# Patient Record
Sex: FEMALE | Race: WHITE | NOT HISPANIC OR LATINO | Employment: FULL TIME | URBAN - METROPOLITAN AREA
[De-identification: names, ages, dates, MRNs, and addresses within clinical notes are randomized per-mention and may not be internally consistent; named-entity substitution may affect disease eponyms.]

---

## 2017-12-15 ENCOUNTER — TRANSCRIBE ORDERS (OUTPATIENT)
Dept: ADMINISTRATIVE | Facility: HOSPITAL | Age: 62
End: 2017-12-15

## 2017-12-15 DIAGNOSIS — Z12.31 SCREENING MAMMOGRAM, ENCOUNTER FOR: Primary | ICD-10-CM

## 2018-02-14 ENCOUNTER — OFFICE VISIT (OUTPATIENT)
Dept: ORTHOPEDIC SURGERY | Facility: CLINIC | Age: 63
End: 2018-02-14

## 2018-02-14 VITALS
DIASTOLIC BLOOD PRESSURE: 84 MMHG | HEART RATE: 67 BPM | WEIGHT: 131 LBS | SYSTOLIC BLOOD PRESSURE: 128 MMHG | HEIGHT: 55 IN | BODY MASS INDEX: 30.32 KG/M2

## 2018-02-14 DIAGNOSIS — M19.019 ACROMIOCLAVICULAR JOINT ARTHRITIS: ICD-10-CM

## 2018-02-14 DIAGNOSIS — S49.92XA INJURY OF GLENOID LABRUM, LEFT, INITIAL ENCOUNTER: ICD-10-CM

## 2018-02-14 DIAGNOSIS — R52 PAIN: Primary | ICD-10-CM

## 2018-02-14 PROCEDURE — 73030 X-RAY EXAM OF SHOULDER: CPT | Performed by: NURSE PRACTITIONER

## 2018-02-14 PROCEDURE — 99203 OFFICE O/P NEW LOW 30 MIN: CPT | Performed by: NURSE PRACTITIONER

## 2018-02-14 RX ORDER — CYCLOBENZAPRINE HCL 10 MG
10 TABLET ORAL 3 TIMES DAILY PRN
COMMUNITY

## 2018-02-14 RX ORDER — TRIAMCINOLONE ACETONIDE 55 UG/1
2 SPRAY, METERED NASAL DAILY
COMMUNITY
End: 2019-02-05 | Stop reason: ALTCHOICE

## 2018-02-14 RX ORDER — ASCORBIC ACID 500 MG
500 TABLET ORAL DAILY
COMMUNITY

## 2018-02-14 NOTE — PROGRESS NOTES
Subjective:     Patient ID: Flores Seaman is a 62 y.o. female.     Chief Complaint: left shoulder pain    History of Present Illness    Ms. Seaman is a 62 y.o female who presents to clinic with a reported 1 year history of pain at left shoulder. Denies known injury, she is left hand dominant. Pain present at superior anterior and posterior joint line left shoulder. Only experiences pain with certain motions such as bring arm across body and lifting up as well as with abduction and external rotation activities. Denies previous known shoulder dislocations. She has not been able to complete upper body strength training within the last one year secondary to left shoulder pain. Has completed physical therapy which was helpful for a short period however symptoms progressively returned. Denies that the pain is radiating down her left upper extremity, denies pain radiating up into left side of her neck. Denies presence of numbness and tingling radiating down left upper extremity. Initially when first began experiencing pain was encouraged to take aleve (2 tablets) with mild symptom relief however she is not taking any medications. Denies previous x-ray, MRI, CT and corticosteroid injections. Rates pain at 7 out of 10, aching in nature again with certain activities. Denies all other concerns present at this time.      Social History     Occupational History   • Not on file.     Social History Main Topics   • Smoking status: Never Smoker   • Smokeless tobacco: Not on file   • Alcohol use Yes   • Drug use: Not on file   • Sexual activity: Not on file      History reviewed. No pertinent past medical history.  History reviewed. No pertinent surgical history.    Family History   Problem Relation Age of Onset   • Cancer Father          Review of Systems   Constitutional: Negative for chills, diaphoresis, fever and unexpected weight change.   HENT: Negative for hearing loss, nosebleeds, sore throat and tinnitus.    Eyes:  "Negative for pain and visual disturbance.   Respiratory: Positive for cough. Negative for shortness of breath and wheezing.    Cardiovascular: Negative for chest pain and palpitations.   Gastrointestinal: Negative for abdominal pain, diarrhea, nausea and vomiting.   Endocrine: Negative for cold intolerance, heat intolerance and polydipsia.   Genitourinary: Negative for difficulty urinating, dysuria and hematuria.   Musculoskeletal: Positive for arthralgias and myalgias. Negative for joint swelling.   Skin: Negative for rash and wound.   Allergic/Immunologic: Negative for environmental allergies.   Neurological: Positive for numbness. Negative for dizziness and syncope.   Hematological: Does not bruise/bleed easily.   Psychiatric/Behavioral: Negative for dysphoric mood and sleep disturbance. The patient is not nervous/anxious.            Objective:  Physical Exam    Vital signs reviewed.   General: No acute distress.  Eyes: conjunctiva clear; pupils equally round and reactive  ENT: external ears and nose atraumatic; oropharynx clear  CV: no peripheral edema  Resp: normal respiratory effort  Skin: no rashes or wounds; normal turgor  Psych: mood and affect appropriate; recent and remote memory intact  Vitals:    02/14/18 1405   BP: 128/84   Pulse: 67   Weight: 59.4 kg (131 lb)   Height: 62.8 cm (24.71\")     Last 2 weights    02/14/18  1405   Weight: 59.4 kg (131 lb)     Body mass index is 150.91 kg/(m^2).     Left Shoulder Exam     Range of Motion   Forward Flexion: 180+   External Rotation: 60   Internal Rotation 0 degrees: T12     Muscle Strength   Internal Rotation: 5/5   External Rotation: 5/5   Supraspinatus: 5/5   Subscapularis: 5/5   Biceps: 5/5     Tests   Apprehension: negative  Cross Arm: negative  Drop Arm: negative  Hawkin's test: positive  Impingement: positive  Sulcus: absent    Other   Erythema: absent  Scars: absent  Sensation: normal  Pulse: present     Comments:  Negative empty can  positive " Maries's  negative Speed's  negative bear hug exam  Pain present over superior anterior and posterior joint line  Pain present with apprehension test              Imaging:  Left Shoulder X-Ray  Indication: Pain  AP Internal and External Rotation views    Findings:  No fracture  No bony lesion  Normal soft tissues  AC joint arthritis    No prior studies were available for comparison.    Assessment:       1. Pain    2. Acromioclavicular joint arthritis    3. Injury of glenoid labrum, left, initial encounter          Plan:  1. Discussed plan of care with patient. Will schedule for US guided glenohumeral injection anterior aspect left shoulder.   2. Provided with sample of topical Pennsaid. Encouraged to call back to clinic if medication is helpful and will send in prescription to specialty pharmacy. Will plan to see her back in clinic in 6-8 weeks. She is able to continue with strengthening activities as pain permits. Patient verbalized understanding of all information and agrees with plan of care. Denies all other concerns present at this time.     Orders:  Orders Placed This Encounter   Procedures   • XR Shoulder 2+ View Left   • Ambulatory Referral to Sports Medicine       HERBERT query complete.    Dictated utilizing Dragon dictation

## 2018-02-21 ENCOUNTER — OFFICE VISIT (OUTPATIENT)
Dept: SPORTS MEDICINE | Facility: CLINIC | Age: 63
End: 2018-02-21

## 2018-02-21 VITALS
DIASTOLIC BLOOD PRESSURE: 74 MMHG | WEIGHT: 130 LBS | HEIGHT: 63 IN | SYSTOLIC BLOOD PRESSURE: 112 MMHG | BODY MASS INDEX: 23.04 KG/M2

## 2018-02-21 DIAGNOSIS — M25.512 LEFT SHOULDER PAIN, UNSPECIFIED CHRONICITY: Primary | ICD-10-CM

## 2018-02-21 PROCEDURE — 20611 DRAIN/INJ JOINT/BURSA W/US: CPT | Performed by: FAMILY MEDICINE

## 2018-02-21 RX ORDER — TRIAMCINOLONE ACETONIDE 40 MG/ML
80 INJECTION, SUSPENSION INTRA-ARTICULAR; INTRAMUSCULAR ONCE
Status: COMPLETED | OUTPATIENT
Start: 2018-02-21 | End: 2018-02-21

## 2018-02-21 RX ADMIN — TRIAMCINOLONE ACETONIDE 80 MG: 40 INJECTION, SUSPENSION INTRA-ARTICULAR; INTRAMUSCULAR at 11:45

## 2018-02-21 NOTE — PROGRESS NOTES
"Here today for ultrasound-guided injection of the left shoulder for possible labral tear.    Ultrasound-Guided Shoulder Injection Procedure Note    Left shoulder injection was discussed with the patient in detail, including indication, risks, benefits, and alternatives. Verbal consent was given for the procedure. Injection was performed by MD.  Injection site was identified by ultrasound examination, then cleaned with Betadine and alcohol swabs. Prior to needle insertion, ethyl chloride spray was used for surface anesthesia. Sterile technique was used. Ultrasound guidance was indicated for injection accuracy.  A 22-gauge, 3.5\" spinal needle was guided to the glenohumeral joint under continuous direct ultrasound visualization. Injectate was seen filing the space and passed without difficulty. The needle was removed and a simple bandage was applied. The procedure was tolerated well without difficulty.    Injection mixture:  1% lidocaine without epinephrine: 2 mL  0.5% bupivacaine without epinephrine: 2 mL  40 mg/mL triamcinolone acetonide: 2 mL     Diagnoses and all orders for this visit:    Left shoulder pain, unspecified chronicity  -     triamcinolone acetonide (KENALOG-40) injection 80 mg; Inject 2 mL into the joint 1 (One) Time.       "

## 2018-04-13 ENCOUNTER — OFFICE VISIT (OUTPATIENT)
Dept: ORTHOPEDIC SURGERY | Facility: CLINIC | Age: 63
End: 2018-04-13

## 2018-04-13 VITALS — BODY MASS INDEX: 22.32 KG/M2 | WEIGHT: 126 LBS | HEIGHT: 63 IN

## 2018-04-13 DIAGNOSIS — S49.92XA INJURY OF GLENOID LABRUM, LEFT, INITIAL ENCOUNTER: Primary | ICD-10-CM

## 2018-04-13 PROCEDURE — 99212 OFFICE O/P EST SF 10 MIN: CPT | Performed by: NURSE PRACTITIONER

## 2018-04-13 NOTE — PROGRESS NOTES
Subjective:     Patient ID: Flores Seaman is a 62 y.o. female.    Chief Complaint: follow-up left shoulder, AC joint arthritis    History of Present Illness    Ms. Seaman presents to clinic for follow-up left shoulder. States she has noted significant improvement of symptoms after receiving corticosteroid injection at glenohumeral joint. States she has noted significant symptom improvement after injection. Initially presented to this APR[facility] with a reported 1 year history of pain at left shoulder. Denies known injury, she is left hand dominant. Pain present at superior anterior and posterior joint line left shoulder. Only experiences pain with certain motions such as bring arm across body and lifting up as well as with abduction and external rotation activities. Denies previous known shoulder dislocations.    She has not been able to complete upper body strength training within the last one year secondary to left shoulder pain. Has completed physical therapy which was helpful for a short period however symptoms progressively returned. Denies that the pain is radiating down her left upper extremity, denies pain radiating up into left side of her neck. Denies presence of numbness and tingling radiating down left upper extremity. Initially when first began experiencing pain was encouraged to take aleve (2 tablets) with mild symptom relief however she is not taking any medications. Denies previous x-ray, MRI, CT and corticosteroid injections. Rates pain at 7 out of 10, aching in nature again with certain activities. Denies all other concerns present at this time.      Social History     Occupational History   • Not on file.     Social History Main Topics   • Smoking status: Never Smoker   • Smokeless tobacco: Never Used   • Alcohol use Yes   • Drug use: No   • Sexual activity: Defer      History reviewed. No pertinent past medical history.  History reviewed. No pertinent surgical history.    Family History   Problem  "Relation Age of Onset   • Cancer Father          Review of Systems   Constitutional: Negative for activity change, chills, diaphoresis, fever and unexpected weight change.   HENT: Negative for hearing loss, nosebleeds, sore throat and tinnitus.    Eyes: Negative for pain and visual disturbance.   Respiratory: Negative for cough, shortness of breath and wheezing.    Cardiovascular: Negative for chest pain and palpitations.   Gastrointestinal: Negative for abdominal pain, diarrhea, nausea and vomiting.   Endocrine: Negative for cold intolerance, heat intolerance and polydipsia.   Genitourinary: Negative for difficulty urinating, dysuria and hematuria.   Musculoskeletal: Positive for arthralgias and myalgias. Negative for joint swelling.   Skin: Negative for rash and wound.   Allergic/Immunologic: Negative for environmental allergies.   Neurological: Negative for dizziness, syncope and numbness.   Hematological: Does not bruise/bleed easily.   Psychiatric/Behavioral: Negative for dysphoric mood and sleep disturbance. The patient is not nervous/anxious.            Objective:  Physical Exam    General: No acute distress.  Eyes: conjunctiva clear; pupils equally round and reactive  ENT: external ears and nose atraumatic; oropharynx clear  CV: no peripheral edema  Resp: normal respiratory effort  Skin: no rashes or wounds; normal turgor  Psych: mood and affect appropriate; recent and remote memory intact    Vitals:    04/13/18 0836   Weight: 57.2 kg (126 lb)   Height: 160 cm (63\")     1    04/13/18  0836   Weight: 57.2 kg (126 lb)     Body mass index is 22.32 kg/m².     Ortho Exam    Left Shoulder Exam      Range of Motion   Forward Flexion: 180+   External Rotation: 60   Internal Rotation 0 degrees: T12      Muscle Strength   Internal Rotation: 5/5   External Rotation: 5/5   Supraspinatus: 5/5   Subscapularis: 5/5   Biceps: 5/5      Tests   Apprehension: negative  Cross Arm: negative  Drop Arm: negative  Hawkin's test: " positive  Impingement: positive  Sulcus: absent     Other   Erythema: absent  Scars: absent  Sensation: normal  Pulse: present      Comments:    Negative empty can  positive Jordan's  negative Speed's  negative bear hug exam  Pain previously present over superior anterior and posterior joint line have significantly decreased    Assessment:       1. Injury of glenoid labrum, left, initial encounter          Plan:  1. Discussed plan of care with patient. Will plan to follow-up only as needed in 3-4 months. Patient verbalized understanding of all information and agrees with plan of care. Denies all other concerns present at this time.     Orders:  No orders of the defined types were placed in this encounter.      Dictated utilizing Dragon dictation

## 2018-04-17 PROBLEM — S49.90XA INJURY OF GLENOID LABRUM: Status: ACTIVE | Noted: 2018-04-17

## 2018-06-07 ENCOUNTER — HOSPITAL ENCOUNTER (OUTPATIENT)
Dept: MAMMOGRAPHY | Facility: HOSPITAL | Age: 63
Discharge: HOME | End: 2018-06-07
Attending: INTERNAL MEDICINE | Admitting: INTERNAL MEDICINE

## 2018-06-07 DIAGNOSIS — Z12.31 SCREENING MAMMOGRAM, ENCOUNTER FOR: ICD-10-CM

## 2018-06-07 PROCEDURE — 77067 SCR MAMMO BI INCL CAD: CPT

## 2018-06-07 PROCEDURE — 77063 BREAST TOMOSYNTHESIS BI: CPT

## 2018-10-23 ENCOUNTER — HOSPITAL ENCOUNTER (OUTPATIENT)
Dept: CT IMAGING | Facility: HOSPITAL | Age: 63
Discharge: HOME | End: 2018-10-23
Attending: INTERNAL MEDICINE | Admitting: INTERNAL MEDICINE

## 2018-10-23 ENCOUNTER — TRANSCRIBE ORDERS (OUTPATIENT)
Dept: ADMINISTRATIVE | Facility: HOSPITAL | Age: 63
End: 2018-10-23

## 2018-10-23 DIAGNOSIS — R10.9 ABDOMINAL PAIN, UNSPECIFIED ABDOMINAL LOCATION: ICD-10-CM

## 2018-10-23 DIAGNOSIS — R31.9 HEMATURIA, UNSPECIFIED TYPE: Primary | ICD-10-CM

## 2018-10-23 DIAGNOSIS — R31.9 HEMATURIA, UNSPECIFIED TYPE: ICD-10-CM

## 2018-10-23 PROCEDURE — 74176 CT ABD & PELVIS W/O CONTRAST: CPT

## 2018-11-26 ENCOUNTER — OFFICE VISIT (OUTPATIENT)
Dept: SPORTS MEDICINE | Facility: CLINIC | Age: 63
End: 2018-11-26

## 2018-11-26 VITALS
BODY MASS INDEX: 23.21 KG/M2 | DIASTOLIC BLOOD PRESSURE: 70 MMHG | SYSTOLIC BLOOD PRESSURE: 110 MMHG | WEIGHT: 131 LBS | HEIGHT: 63 IN

## 2018-11-26 DIAGNOSIS — M25.512 CHRONIC LEFT SHOULDER PAIN: Primary | ICD-10-CM

## 2018-11-26 DIAGNOSIS — G89.29 CHRONIC LEFT SHOULDER PAIN: Primary | ICD-10-CM

## 2018-11-26 DIAGNOSIS — M75.22 BICEPS TENDINITIS OF LEFT UPPER EXTREMITY: ICD-10-CM

## 2018-11-26 PROCEDURE — 76942 ECHO GUIDE FOR BIOPSY: CPT | Performed by: FAMILY MEDICINE

## 2018-11-26 PROCEDURE — 20550 NJX 1 TENDON SHEATH/LIGAMENT: CPT | Performed by: FAMILY MEDICINE

## 2018-11-26 PROCEDURE — 99214 OFFICE O/P EST MOD 30 MIN: CPT | Performed by: FAMILY MEDICINE

## 2018-11-26 RX ORDER — ALBUTEROL SULFATE 90 UG/1
2 AEROSOL, METERED RESPIRATORY (INHALATION) EVERY 4 HOURS PRN
COMMUNITY
End: 2020-12-14 | Stop reason: SDUPTHER

## 2018-11-26 RX ORDER — TRIAMCINOLONE ACETONIDE 40 MG/ML
40 INJECTION, SUSPENSION INTRA-ARTICULAR; INTRAMUSCULAR ONCE
Status: COMPLETED | OUTPATIENT
Start: 2018-11-26 | End: 2018-11-26

## 2018-11-26 RX ADMIN — TRIAMCINOLONE ACETONIDE 40 MG: 40 INJECTION, SUSPENSION INTRA-ARTICULAR; INTRAMUSCULAR at 12:23

## 2018-11-26 NOTE — PROGRESS NOTES
"Flores is a 63 y.o. year old female    Chief Complaint   Patient presents with   • Shoulder Pain     Left // x Month Ago        History of Present Illness  HPI   Today for left shoulder pain.  New pain started about a month ago, worse with increased resistance and weightlifting classes.  This is different from the pain that she saw Nicolette Castellano for in February when I performed an intra-articular injection for her.  Describes constant, sharp anterior pain, radiates to the upper arm.  Worse if holding her purse or abducting the arm.  No associated numbness or tingling.  Moderately severe, gradually worsening.    I have reviewed the patient's medical, family, and social history in detail and updated the computerized patient record.    Review of Systems   Constitutional: Negative for fever.   Skin: Negative for wound.   Neurological: Negative for numbness.   All other systems reviewed and are negative.      /70   Ht 160 cm (62.99\")   Wt 59.4 kg (131 lb)   BMI 23.21 kg/m²      Physical Exam    Vital signs reviewed.   General: No acute distress.  Eyes: conjunctiva clear; pupils equally round and reactive  ENT: external ears and nose atraumatic; oropharynx clear  CV: no peripheral edema, 2+ distal pulses  Resp: normal respiratory effort, no use of accessory muscles  Skin: no rashes or wounds; normal turgor  Psych: mood and affect appropriate; recent and remote memory intact  Neuro: sensation to light touch intact    [facility] Exam:  Ortho Exam  Left shoulder: Normal appearance.  Tenderness to palpation on the subacromial space and the long head of the bicep tendon.  Normal range of motion.  Positive Neer and Mcfadden.  Positive speed but negative Yergason.  Normal rotator cuff strength but there is pain with external rotation.  Negative [place].  Equivocal circumduction.    Reviewed reports from x-rays that were taken in February, notable for some acromioclavicular arthritis.    Ultrasound-Guided Shoulder Injection " "Procedure Note    Left shoulder injection was discussed with the patient in detail, including indication, risks, benefits, and alternatives. Verbal consent was given for the procedure. Injection was performed by MD.  Injection site was identified by ultrasound examination, then cleaned with Betadine and alcohol swabs. Prior to needle insertion, ethyl chloride spray was used for surface anesthesia. Sterile technique was used. Ultrasound guidance was indicated for injection accuracy.  A 22-gauge, 3.5\" spinal needle was guided to the biceps tendon sheath under continuous direct ultrasound visualization. Injectate was seen filing the space and passed without difficulty. The needle was removed and a simple bandage was applied. The procedure was tolerated well without difficulty.    Injection mixture:  1% lidocaine without epinephrine: 1 mL  40 mg/mL triamcinolone acetonide: 1 mL           Diagnoses and all orders for this visit:    Chronic left shoulder pain  -     Ambulatory Referral to Physical Therapy Evaluate and treat    Biceps tendinitis of left upper extremity  -     Ambulatory Referral to Physical Therapy Evaluate and treat  -     triamcinolone acetonide (KENALOG-40) injection 40 mg; Inject 1 mL into the appropriate joint as directed by provider 1 (One) Time.    Other orders  -     albuterol (PROVENTIL HFA;VENTOLIN HFA) 108 (90 Base) MCG/ACT inhaler; Inhale 2 puffs Every 4 (Four) Hours As Needed for Wheezing.    Discuss that her pain is likely multifactorial.  This could be stemming from a labral tear as was suspected earlier this year, but her presentation is certainly different today.  Agreed upon biceps tendon sheath injection today which was tolerated well, as this appears to be the primary factor of her pain at this time.  Discussed that would correlate to labral pathology.  She also has some impingement signs, possibly related to her acromioclavicular arthritis.  Depending on her response we may choose to " perform a subacromial injection or evaluate further with MRI.  We'll follow-up to check on her progress in about 6 weeks.    EMR Dragon/Transcription disclaimer:    Much of this encounter note is an electronic transcription/translation of spoken language to printed text.  The electronic translation of spoken language may permit erroneous, or at times, nonsensical words or phrases to be inadvertently transcribed.  Although I have reviewed the note for such errors some may still exist.

## 2018-12-05 ENCOUNTER — HOSPITAL ENCOUNTER (OUTPATIENT)
Dept: PHYSICAL THERAPY | Facility: HOSPITAL | Age: 63
Setting detail: THERAPIES SERIES
Discharge: HOME | End: 2018-12-05

## 2018-12-05 DIAGNOSIS — G89.29 CHRONIC LEFT SHOULDER PAIN: Primary | ICD-10-CM

## 2018-12-05 DIAGNOSIS — M25.512 CHRONIC LEFT SHOULDER PAIN: Primary | ICD-10-CM

## 2018-12-05 PROCEDURE — 97161 PT EVAL LOW COMPLEX 20 MIN: CPT | Performed by: PHYSICAL THERAPIST

## 2018-12-05 NOTE — THERAPY EVALUATION
Outpatient Physical Therapy Ortho Initial Evaluation   [place]     Patient Name: Flores Seaman  : 1955  MRN: [id number]  Today's Date: 2018      Visit Date: 2018    Patient Active Problem List   Diagnosis   • Injury of glenoid labrum        No past medical history on file.     No past surgical history on file.    Visit Dx:     ICD-10-CM ICD-9-CM   1. Chronic left shoulder pain M25.512 719.41    G89.29 338.29       Patient History     Row Name 18 1200             History    Chief Complaint  Difficulty with daily activities;Pain  -GC      Type of Pain  Shoulder pain left  -GC      Brief Description of Current Complaint  Pt reports an almost year history of left shoulder pain with swimming and lifting weights. she has a previous labral injury. she has had 2 cortisone injections thus far which states has helped the pain somewhat. She was seen by Dr. Pantoja who has referred her for therapy.  -GC      Patient/Caregiver Goals  Relieve pain;Return to prior level of function;Improve strength  -GC      Patient's Rating of General Health  Good  -GC      Hand Dominance  left-handed  -GC      Occupation/sports/leisure activities  respiratory therapist  -      What clinical tests have you had for this problem?  CT scan  -GC      Results of Clinical Tests  negative  -GC         Pain     Pain Location  Shoulder left  -GC      Pain at Present  1  -GC      Pain at Best  1  -GC      Pain at Worst  2  -GC      Pain Frequency  Constant/continuous  -GC      Pain Description  Aching;Discomfort;Sore;Tender  -GC      What Performance Factors Make the Current Problem(s) WORSE?  Pt c/o pain when she tries to swim and when she lifts weights  -GC      What Performance Factors Make the Current Problem(s) BETTER?  Pt feels better if she rests her left UE  -GC      Difficulties at work?  Pt has just a little difficulty with the steering/torque that is created with the use of the COW   -GC      Difficulties  with ADL's?  Pt has pain when she uses the vacuum  -GC      Difficulties with recreational activities?  Pt has painwith her swimming and weight leifting  -GC         Daily Activities    Primary Language  English  -GC      Are you able to read  Yes  -GC      Are you able to write  Yes  -GC      How does patient learn best?  Reading  -GC      Teaching needs identified  Home Exercise Program;Management of Condition  -GC      Patient is concerned about/has problems with  Repetitive movements of the hand, arm, shoulder;Performing sports, recreation, and play activities;Performing home management (household chores, shopping, care of dependents)  -GC      Does patient have problems with the following?  None  -GC      Barriers to learning  None  -GC      Pt Participated in POC and Goals  Yes  -GC         Safety    Are you being hurt, hit, or frightened by anyone at home or in your life?  No  -GC      Are you being neglected by a caregiver  No  -GC        User Key  (r) = Recorded By, (t) = Taken By, (c) = Cosigned By    Initials Name Provider Type    GC Liam Henson, PT Physical Therapist          PT Ortho     Row Name 12/05/18 1200       Posture/Observations    Forward Head  Mild  -GC    Cervical Lordosis  Decreased  -GC    Thoracic Kyphosis  Normal  -GC    Rounded Shoulders  Bilateral:;Mild  -GC    Scapular Elevation  Bilateral:;Normal  -GC    Scapular winging  Bilateral:;Normal  -GC    Posture/Observations Comments  --  -GC       Shoulder Impingement/Rotator Cuff Special Tests    Mcfadden-Moody Test (RC Lesion vs. Bursitis)  Left:;Positive  -GC    Neer Impingement Test (RC Lesion vs. Bursitis)  Left:;Negative  -GC    Drop Arm Test (Full Thickness RC Lesion)  Left:;Negative  -GC       Shoulder Laxity/Instability Special Tests    Load and Shift Test  Left:;Negative  -GC    Sulcus Sign, 0 Degrees  Left:;Negative  -GC    Anterior Apprehension/Relocation Test, at 90 Degrees  Left:;Negative  -GC       Biceps/Labral Special  Tests    Conecuh's Test (Labral Test)  Left:;Negative  -GC       Shoulder Girdle Palpation    Supraspinatus Insertion  Left:;Tender  -GC    Long Head of Biceps  Left:;Tender  -GC    Greater Tubercule  Left:;Tender  -GC       Left Upper Ext    Lt Shoulder Abduction AROM  WFL  -GC    Lt Shoulder Extension AROM  WFL  -GC    Lt Shoulder Flexion AROM  WFL  -GC    Lt Shoulder External Rotation AROM  WFL  -GC    Lt Shoulder Internal Rotation AROM  72 degrees  -GC    Lt Elbow Extension/Flexion AROM  WFL  -GC       MMT Right Upper Ext    Rt Shoulder Flexion MMT, Gross Movement  (4+/5) good plus  -GC    Rt Shoulder Extension MMT, Gross Movement  (5/5) normal  -GC    Rt Shoulder ABduction MMT, Gross Movement  (4+/5) good plus  -GC    Rt Shoulder ADduction MMT, Gross Movement  (5/5) normal  -GC    Rt Shoulder Internal Rotation MMT, Gross Movement  (5/5) normal  -GC    Rt Shoulder External Rotation MMT, Gross Movement  (4/5) good  -GC    Rt Scapular Elevation MMT, Gross Movement  (5/5) normal  -GC    Rt Scapular ADduction MMT, Gross Movement  (4+/5) good plus  -GC    Rt Scapular ADduction & Depression MMT, Gross Movement  (4+/5) good plus  -GC    Rt Scapular ADD & Medial Rotation MMT, Gross Movement  (4/5) good  -GC    Rt Scapular ABD & Lateral Rotation MMT, Gross Movement  (4/5) good  -GC    Rt Elbow Flexion MMT, Gross Movement:  (5/5) normal  -GC    Rt Elbow Extension MMT, Gross Movement:  (5/5) normal  -GC    Rt Upper Extremity Comments   scaption strength is 4/5  -GC       Sensation    Light Touch  No apparent deficits  -GC      User Key  (r) = Recorded By, (t) = Taken By, (c) = Cosigned By    Initials Name Provider Type    GC Liam Henson, PT Physical Therapist                      Therapy Education  Given: HEP, Symptoms/condition management, Pain management  Program: New  How Provided: Verbal, Demonstration, Written  Provided to: Patient  Level of Understanding: Teach back education performed, Verbalized, Demonstrated      PT OP Goals     Row Name 12/05/18 1200          PT Short Term Goals    STG Date to Achieve  12/19/18  -GC     STG 1  decrease left shoulder pain to 1/10 with activity.  -GC     STG 2  Increase left shoulder IR AROM to WFL with testing.  -GC     STG 3  Increase left shoulder girdle strength to at least 4+/5 all planes with testing.  -GC     STG 4  Pt will be independent with her HEP issued by this therapist.  -GC        Long Term Goals    LTG Date to Achieve  01/02/19  -GC     LTG 1  Decrease left shoulder pain to 0/10 with activity.  -GC     LTG 2  Increase left shoulder girdles strength to 5/5 all planes with testing.  -GC     LTG 3  Pt will be independent with all ADLsand have a Quick Dash score < 10.  -        Time Calculation    PT Goal Re-Cert Due Date  01/02/19  -       User Key  (r) = Recorded By, (t) = Taken By, (c) = Cosigned By    Initials Name Provider Type     Liam Henson, PT Physical Therapist          PT Assessment/Plan     Row Name 12/05/18 1200          PT Assessment    Functional Limitations  Limitation in home management;Limitations in community activities;Limitations in functional capacity and performance;Performance in leisure activities;Performance in work activities  -     Impairments  Impaired flexibility;Range of motion;Peripheral nerve integrity;Muscle strength  -     Assessment Comments  Pt presents with a year plus histroy of left shoulder pain that she rates up 2/10 with activity. She ahs decreased left shoulder IR AROM, decreased left shoulder girdle strength, and decreased function secondary to the above.  -     Rehab Potential  Good  -     Patient/caregiver participated in establishment of treatment plan and goals  Yes  -     Patient would benefit from skilled therapy intervention  Yes  -GC        PT Plan    PT Frequency  1x/week;2x/week  -     Predicted Duration of Therapy Intervention (Therapy Eval)  4 weeks  -     Planned CPT's?  PT EVAL LOW COMPLEXITY:  73196;PT THER PROC EA 15 MIN: 80640;PT MANUAL THERAPY EA 15 MIN: 38329;PT ULTRASOUND EA 15 MIN: 54153;PT IONTOPHORESIS EA 15 MIN: 29872  -GC     PT Plan Comments  Pt is to continue her HEP 2x daily  -GC       User Key  (r) = Recorded By, (t) = Taken By, (c) = Cosigned By    Initials Name Provider Type    GC Liam Henson, PT Physical Therapist          Modalities     Row Name 12/05/18 1200 12/05/18 1100          Iontophoresis 11360    Milliamps  3  -GC  --  -GC     MA/Min  40  -GC  --  -GC     Dexamethasone used  Yes  -GC  --  -GC     Patch Type  Medium greater tubercle/bicipital groove left shoulder  -GC  --  -GC     60188 - PT Iontophoresis Minutes  13  -GC  --  -GC       User Key  (r) = Recorded By, (t) = Taken By, (c) = Cosigned By    Initials Name Provider Type    GC Liam Henson, PT Physical Therapist        Exercises     Row Name 12/05/18 1200             Exercise 1    Exercise Name 1  Sidelying shoudler ER  -GC      Cueing 1  Verbal;Tactile  -GC      Reps 1  25  -GC      Additional Comments  1#  -GC         Exercise 2    Exercise Name 2  Scaption Down  -GC      Cueing 2  Verbal;Demo  -GC      Reps 2  25  -GC         Exercise 3    Exercise Name 3  Scaption Up  -GC      Cueing 3  Verbal;Demo  -GC      Reps 3  25  -GC         Exercise 4    Exercise Name 4  Rhomboid Lift off  -GC      Cueing 4  Verbal;Demo  -GC      Reps 4  25  -GC         Exercise 5    Exercise Name 5  Shoulder rows vs theraband  -GC      Cueing 5  Verbal;Demo  -GC      Reps 5  25  -GC      Additional Comments  silver  -GC         Exercise 6    Exercise Name 6  shoulder ext vs theraband  -GC      Cueing 6  Verbal;Demo  -GC      Reps 6  25  -GC      Additional Comments  silver  -GC         Exercise 7    Exercise Name 7  Sleeper stretch  -GC      Cueing 7  Verbal;Tactile  -GC      Reps 7  15  -GC      Time 7  10 secs  -GC        User Key  (r) = Recorded By, (t) = Taken By, (c) = Cosigned By    Initials Name Provider Type    GC Liam Henson,  PT Physical Therapist                        Outcome Measure Options: Quick DASH  Quick DASH  Open a tight or new jar.: No Difficulty  Do heavy household chores (e.g., wash walls, wash floors): Moderate Difficulty  Carry a shopping bag or briefcase: No Difficulty  Wash your back: No Difficulty  Use a knife to cut food: No Difficulty  Recreational activities in which you take some force or impact through your arm, should or hand (e.g. golf, hammering, tennis, etc.): Moderate Difficulty  During the past week, to what extent has your arm, shoulder, or hand problem interfered with your normal social activites with family, friends, neighbors or groups?: Not at all  During the past week, were you limited in your work or other regular daily activities as a result of your arm, shoulder or hand problem?: Not limited at all  Arm, Shoulder, or hand pain: Moderate  Tingling (pins and needles) in your arm, shoulder, or hand: Mild  During the past week, how much difficulty have you had sleeping because of the pain in your arm, shoulder or hand?: No difficulty  Number of Questions Answered: 11  Quick DASH Score: 15.91  Work Module (Optional)  Using your usual technique for your work?: No Difficulty  Doing your usual work because of arm, shoulder or hand pain?: No Difficulty  Doing your work as well as you would like?: No Difficulty  Spending your usual amount of time doing your work?: No Difficulty  Work Module Score: 0  Sports/Performing Arts Module (Optional)  Using your usual technique for playing your instrument or sport?: Mild Difficulty  Playing your musical instrument or sport because of arm, shoulder or hand pain?: Mild Difficulty  Playing your musical instrument or sport as well as you would like?: Mild Difficulty  Spending your usual amount of time practising or playing your instrument or sport?: Mild Difficulty  Sports/Performing Arts Score: 25         Time Calculation:         Start Time: 1200  Stop Time: 1257  Time  Calculation (min): 57 min     Therapy Charges for Today     Code Description Service Date Service Provider Modifiers Qty    63915133850 HC PT EVAL LOW COMPLEXITY 2 12/5/2018 Liam Henson, PT GP 1          PT G-Codes  Outcome Measure Options: Quick DASH  Quick DASH Score: 15.91         Liam Henson, PT  12/5/2018

## 2018-12-11 ENCOUNTER — HOSPITAL ENCOUNTER (OUTPATIENT)
Dept: PHYSICAL THERAPY | Facility: HOSPITAL | Age: 63
Setting detail: THERAPIES SERIES
Discharge: HOME | End: 2018-12-11

## 2018-12-11 DIAGNOSIS — M25.512 CHRONIC LEFT SHOULDER PAIN: Primary | ICD-10-CM

## 2018-12-11 DIAGNOSIS — G89.29 CHRONIC LEFT SHOULDER PAIN: Primary | ICD-10-CM

## 2018-12-11 PROCEDURE — 97033 APP MDLTY 1+IONTPHRSIS EA 15: CPT | Performed by: PHYSICAL THERAPIST

## 2018-12-11 PROCEDURE — 97110 THERAPEUTIC EXERCISES: CPT | Performed by: PHYSICAL THERAPIST

## 2018-12-11 NOTE — THERAPY TREATMENT NOTE
Outpatient Physical Therapy Ortho Treatment Note   [place]     Patient Name: Flores Seaman  : 1955  MRN: [id number]  Today's Date: 2018      Visit Date: 2018    Visit Dx:    ICD-10-CM ICD-9-CM   1. Chronic left shoulder pain M25.512 719.41    G89.29 338.29       Patient Active Problem List   Diagnosis   • Injury of glenoid labrum        No past medical history on file.     No past surgical history on file.    PT Ortho     Row Name 18 09       Subjective Comments    Subjective Comments  Pt states her shoulder already feels some better.  -GC      User Key  (r) = Recorded By, (t) = Taken By, (c) = Cosigned By    Initials Name Provider Type    Liam Godinez, PT Physical Therapist                      PT Assessment/Plan     Row Name 18 09          PT Assessment    Assessment Comments  Pt is doing well with improved IR ROM and decreased c/o pain.  -GC        PT Plan    PT Plan Comments  Pt is to continue her HEP daily.  -GC       User Key  (r) = Recorded By, (t) = Taken By, (c) = Cosigned By    Initials Name Provider Type    Liam Godinez, PT Physical Therapist          Modalities     Row Name 18 0900             Moist Heat    MH Applied  Yes  -GC      Location  left shoulder  -GC      Rx Minutes  10 mins  -GC      MH Prior to Rx  Yes  -GC         Iontophoresis 89391    Milliamps  3  -GC      MA/Min  40  -GC      Dexamethasone used  Yes  -GC      Patch Type  Medium greater tubercle/bicipital groove left shoulder  -GC      51600 - PT Iontophoresis Minutes  13  -GC        User Key  (r) = Recorded By, (t) = Taken By, (c) = Cosigned By    Initials Name Provider Type    Liam Godinez, PT Physical Therapist          Exercises     Row Name 18 09             Subjective Comments    Subjective Comments  Pt states her shoulder already feels some better.  -GC         Exercise 1    Exercise Name 1  Sidelying shoudler ER  -GC      Cueing 1  Verbal;Tactile  -GC       Reps 1  25  -GC      Additional Comments  2#  -GC         Exercise 2    Exercise Name 2  Scaption Down  -GC      Cueing 2  Verbal;Demo  -GC      Reps 2  25  -GC      Additional Comments  1#  -GC         Exercise 3    Exercise Name 3  Scaption Up  -GC      Cueing 3  Verbal;Demo  -GC      Reps 3  25  -GC      Additional Comments  2#  -GC         Exercise 4    Exercise Name 4  Rhomboid Lift off  -GC      Cueing 4  Verbal;Demo  -GC      Reps 4  25  -GC         Exercise 5    Exercise Name 5  Shoulder rows vs theraband  -GC      Cueing 5  Verbal;Demo  -GC      Reps 5  25  -GC      Additional Comments  gold  -GC         Exercise 6    Exercise Name 6  shoulder ext vs theraband  -GC      Cueing 6  Verbal;Demo  -GC      Reps 6  25  -GC      Additional Comments  gold  -GC         Exercise 7    Exercise Name 7  Sleeper stretch  -GC      Cueing 7  Verbal;Tactile  -GC      Reps 7  15  -GC      Time 7  10 secs  -GC         Exercise 8    Exercise Name 8  GH joint mobilizations  -GC      Cueing 8  Verbal  -GC      Time 8  5 min  -GC        User Key  (r) = Recorded By, (t) = Taken By, (c) = Cosigned By    Initials Name Provider Type    GC Liam Henson, PT Physical Therapist                                            Time Calculation:   Start Time: 0900  Stop Time: 0957  Time Calculation (min): 57 min    Therapy Charges for Today     Code Description Service Date Service Provider Modifiers Qty    94478310901 HC PT IONTOPHORESIS EA 15 MIN 12/11/2018 Liam Henson, PT GP 1    21747439494 HC PT THER PROC EA 15 MIN 12/11/2018 Liam Henson, PT GP 1                    Liam Henson PT  12/11/2018

## 2018-12-17 ENCOUNTER — HOSPITAL ENCOUNTER (OUTPATIENT)
Dept: PHYSICAL THERAPY | Facility: HOSPITAL | Age: 63
Setting detail: THERAPIES SERIES
Discharge: HOME | End: 2018-12-17

## 2018-12-17 DIAGNOSIS — G89.29 CHRONIC LEFT SHOULDER PAIN: Primary | ICD-10-CM

## 2018-12-17 DIAGNOSIS — M25.512 CHRONIC LEFT SHOULDER PAIN: Primary | ICD-10-CM

## 2018-12-17 PROCEDURE — 97033 APP MDLTY 1+IONTPHRSIS EA 15: CPT | Performed by: PHYSICAL THERAPIST

## 2018-12-17 PROCEDURE — 97110 THERAPEUTIC EXERCISES: CPT | Performed by: PHYSICAL THERAPIST

## 2018-12-17 NOTE — THERAPY TREATMENT NOTE
Outpatient Physical Therapy Ortho Treatment Note  [facility]     Patient Name: Flores Seaman  : 1955  MRN: [id number]  Today's Date: 2018      Visit Date: 2018    Visit Dx:    ICD-10-CM ICD-9-CM   1. Chronic left shoulder pain M25.512 719.41    G89.29 338.29       Patient Active Problem List   Diagnosis   • Injury of glenoid labrum        No past medical history on file.     No past surgical history on file.                    PT Assessment/Plan     Row Name 18 0900          PT Assessment    Assessment Comments  Pt is doing well with improving function reported.  -GC        PT Plan    PT Plan Comments  Pt is to continue her HEP daily.  -GC       User Key  (r) = Recorded By, (t) = Taken By, (c) = Cosigned By    Initials Name Provider Type    GC Liam Henson, PT Physical Therapist          Modalities     Row Name 18 0900             Moist Heat    MH Applied  Yes  -GC      Location  left shoulder  -GC      Rx Minutes  10 mins  -GC      MH Prior to Rx  Yes  -GC         Iontophoresis 53574    Milliamps  3.5  -GC      MA/Min  40  -GC      Dexamethasone used  Yes  -GC      Patch Type  Medium greater tubercle/bicipital groove left shoulder  -GC      25486 - PT Iontophoresis Minutes  11  -GC        User Key  (r) = Recorded By, (t) = Taken By, (c) = Cosigned By    Initials Name Provider Type    GC Liam Henson, PT Physical Therapist          Exercises     Row Name 18 0900             Subjective Comments    Subjective Comments  Pt states her shoulder is feeling better.  -GC         Exercise 1    Exercise Name 1  Sidelying shoudler ER  -GC      Cueing 1  Verbal;Tactile  -GC      Reps 1  25  -GC      Additional Comments  3#  -GC         Exercise 2    Exercise Name 2  Scaption Down  -GC      Cueing 2  Verbal;Demo  -GC      Reps 2  25  -GC      Additional Comments  2#  -GC         Exercise 3    Exercise Name 3  Scaption Up  -GC      Cueing 3  Verbal;Demo  -GC      Reps 3  25   -GC      Additional Comments  2#  -GC         Exercise 4    Exercise Name 4  Rhomboid Lift off  -GC      Cueing 4  Verbal;Demo  -GC      Reps 4  25  -GC         Exercise 5    Exercise Name 5  Shoulder rows vs theraband  -GC      Cueing 5  Verbal;Demo  -GC      Reps 5  25  -GC      Additional Comments  gold  -GC         Exercise 6    Exercise Name 6  shoulder ext vs theraband  -GC      Cueing 6  Verbal;Demo  -GC      Reps 6  25  -GC      Additional Comments  gold  -GC         Exercise 7    Exercise Name 7  Sleeper stretch  -GC      Cueing 7  Verbal;Tactile  -GC      Reps 7  15  -GC      Time 7  10 secs  -GC         Exercise 8    Exercise Name 8  GH joint mobilizations  -GC      Cueing 8  Verbal  -GC      Time 8  5 min  -GC        User Key  (r) = Recorded By, (t) = Taken By, (c) = Cosigned By    Initials Name Provider Type     Liam Henson, PT Physical Therapist                                            Time Calculation:   Start Time: 0900  Stop Time: 0948  Time Calculation (min): 48 min    Therapy Charges for Today     Code Description Service Date Service Provider Modifiers Qty    93598532896 HC PT IONTOPHORESIS EA 15 MIN 12/17/2018 Liam Henson, PT GP 1    69105562100 HC PT THER PROC EA 15 MIN 12/17/2018 Liam Henson, PT GP 1                    Liam Henson PT  12/17/2018

## 2018-12-28 ENCOUNTER — APPOINTMENT (OUTPATIENT)
Dept: PHYSICAL THERAPY | Facility: HOSPITAL | Age: 63
End: 2018-12-28

## 2019-01-02 ENCOUNTER — HOSPITAL ENCOUNTER (OUTPATIENT)
Dept: PHYSICAL THERAPY | Facility: HOSPITAL | Age: 64
Setting detail: THERAPIES SERIES
Discharge: HOME | End: 2019-01-02

## 2019-01-02 DIAGNOSIS — M25.512 CHRONIC LEFT SHOULDER PAIN: Primary | ICD-10-CM

## 2019-01-02 DIAGNOSIS — G89.29 CHRONIC LEFT SHOULDER PAIN: Primary | ICD-10-CM

## 2019-01-02 PROCEDURE — 97110 THERAPEUTIC EXERCISES: CPT | Performed by: PHYSICAL THERAPIST

## 2019-01-02 PROCEDURE — 97033 APP MDLTY 1+IONTPHRSIS EA 15: CPT | Performed by: PHYSICAL THERAPIST

## 2019-01-02 NOTE — THERAPY TREATMENT NOTE
Outpatient Physical Therapy Ortho Treatment Note  [facility]     Patient Name: Flores Seaman  : 1955  MRN: [id number]  Today's Date: 2019      Visit Date: 2019    Visit Dx:    ICD-10-CM ICD-9-CM   1. Chronic left shoulder pain M25.512 719.41    G89.29 338.29       Patient Active Problem List   Diagnosis   • Injury of glenoid labrum        No past medical history on file.     No past surgical history on file.                    PT Assessment/Plan     Row Name 19 1230          PT Assessment    Assessment Comments  Pt is doing well with increasing shoulder IR ROM.   -GC        PT Plan    PT Plan Comments  Pt is to continue her HEP 2x daily.  -GC       User Key  (r) = Recorded By, (t) = Taken By, (c) = Cosigned By    Initials Name Provider Type    Liam Godinez, PT Physical Therapist          Modalities     Row Name 19 1230             Moist Heat    MH Applied  Yes  -GC      Location  left shoulder  -GC      Rx Minutes  10 mins  -GC      MH Prior to Rx  Yes  -GC         Iontophoresis 70761    Milliamps  3.5  -GC      MA/Min  40  -GC      Dexamethasone used  Yes  -GC      Patch Type  Medium greater tubercle/bicipital groove left shoulder  -GC        User Key  (r) = Recorded By, (t) = Taken By, (c) = Cosigned By    Initials Name Provider Type    GC Liam Henson, PT Physical Therapist          Exercises     Row Name 19 1230             Subjective Comments    Subjective Comments  Pt states her shoulder feels good right now, but she has not been working or doing anything strenuous.  -GC         Exercise 1    Exercise Name 1  Sidelying shoudler ER  -GC      Cueing 1  Verbal;Tactile  -GC      Reps 1  25  -GC      Additional Comments  3#  -GC         Exercise 2    Exercise Name 2  Scaption Down  -GC      Cueing 2  Verbal;Demo  -GC      Reps 2  25  -GC      Additional Comments  2#  -GC         Exercise 3    Exercise Name 3  Scaption Up  -GC      Cueing 3  Verbal;Demo  -GC       Reps 3  25  -GC      Additional Comments  3#  -GC         Exercise 4    Exercise Name 4  Rhomboid Lift off  -GC      Cueing 4  Verbal;Demo  -GC      Reps 4  25  -GC      Additional Comments  3#  -GC         Exercise 5    Exercise Name 5  Shoulder rows vs theraband  -GC      Cueing 5  Verbal;Demo  -GC         Exercise 6    Exercise Name 6  shoulder ext vs theraband  -GC      Cueing 6  Verbal;Demo  -GC         Exercise 7    Exercise Name 7  Sleeper stretch  -GC      Cueing 7  Verbal;Tactile  -GC      Reps 7  15  -GC      Time 7  10 secs  -GC         Exercise 8    Exercise Name 8  GH joint mobilizations  -GC      Cueing 8  Verbal  -GC      Time 8  5 min  -GC         Exercise 9    Exercise Name 9  Shoulder IR vs theraband  -GC      Cueing 9  Verbal;Demo  -GC      Reps 9  25  -GC      Additional Comments  black  -GC        User Key  (r) = Recorded By, (t) = Taken By, (c) = Cosigned By    Initials Name Provider Type    GC Liam Henson, PT Physical Therapist                                            Time Calculation:   Start Time: 1230  Stop Time: 1324  Time Calculation (min): 54 min  Therapy Suggested Charges     Code   Minutes Charges    None           Therapy Charges for Today     Code Description Service Date Service Provider Modifiers Qty    76092939987 HC PT THER PROC EA 15 MIN 1/2/2019 Liam Henson, PT GP 1    64138304932 HC PT IONTOPHORESIS EA 15 MIN 1/2/2019 Liam Henson, PT GP 1                    Liam eHnson, PT  1/2/2019

## 2019-01-08 ENCOUNTER — HOSPITAL ENCOUNTER (OUTPATIENT)
Dept: PHYSICAL THERAPY | Facility: HOSPITAL | Age: 64
Setting detail: THERAPIES SERIES
Discharge: HOME | End: 2019-01-08

## 2019-01-08 ENCOUNTER — OFFICE VISIT (OUTPATIENT)
Dept: SPORTS MEDICINE | Facility: CLINIC | Age: 64
End: 2019-01-08

## 2019-01-08 VITALS
DIASTOLIC BLOOD PRESSURE: 72 MMHG | HEIGHT: 63 IN | BODY MASS INDEX: 22.68 KG/M2 | SYSTOLIC BLOOD PRESSURE: 112 MMHG | WEIGHT: 128 LBS

## 2019-01-08 DIAGNOSIS — M76.52 PATELLAR TENDINITIS OF BOTH KNEES: ICD-10-CM

## 2019-01-08 DIAGNOSIS — M76.51 PATELLAR TENDINITIS OF BOTH KNEES: ICD-10-CM

## 2019-01-08 DIAGNOSIS — M25.512 CHRONIC LEFT SHOULDER PAIN: Primary | ICD-10-CM

## 2019-01-08 DIAGNOSIS — G89.29 CHRONIC LEFT SHOULDER PAIN: Primary | ICD-10-CM

## 2019-01-08 PROCEDURE — 97033 APP MDLTY 1+IONTPHRSIS EA 15: CPT | Performed by: PHYSICAL THERAPIST

## 2019-01-08 PROCEDURE — 97110 THERAPEUTIC EXERCISES: CPT | Performed by: PHYSICAL THERAPIST

## 2019-01-08 PROCEDURE — 99214 OFFICE O/P EST MOD 30 MIN: CPT | Performed by: FAMILY MEDICINE

## 2019-01-08 NOTE — THERAPY TREATMENT NOTE
Outpatient Physical Therapy Ortho Treatment Note  LEIGH Rodrigez     Patient Name: Flores Seaman  : 1955  MRN: [id number]  Today's Date: 2019      Visit Date: 2019    Visit Dx:    ICD-10-CM ICD-9-CM   1. Chronic left shoulder pain M25.512 719.41    G89.29 338.29       Patient Active Problem List   Diagnosis   • Injury of glenoid labrum        No past medical history on file.     No past surgical history on file.    PT Ortho     Row Name [id number]       Subjective Comments    Subjective Comments  Pt states her shoulder is feeling some better. She saw Dr. Pantoja who has recommended an MRI and possible PRP treatment.  -GC       Left Upper Ext    Lt Shoulder Internal Rotation AROM  79 degrees  -GC       MMT Right Upper Ext    Rt Shoulder Flexion MMT, Gross Movement  (5/5) normal  -GC    Rt Shoulder Extension MMT, Gross Movement  (5/5) normal  -GC    Rt Shoulder ABduction MMT, Gross Movement  (5/5) normal  -GC    Rt Shoulder ADduction MMT, Gross Movement  (5/5) normal  -GC    Rt Shoulder Internal Rotation MMT, Gross Movement  (5/5) normal  -GC    Rt Shoulder External Rotation MMT, Gross Movement  (4+/5) good plus  -GC    Rt Scapular Elevation MMT, Gross Movement  (5/5) normal  -GC    Rt Scapular ADduction MMT, Gross Movement  (4+/5) good plus  -GC    Rt Scapular ADduction & Depression MMT, Gross Movement  (4+/5) good plus  -GC    Rt Scapular ADD & Medial Rotation MMT, Gross Movement  (4+/5) good plus  -GC    Rt Scapular ABD & Lateral Rotation MMT, Gross Movement  (4+/5) good plus  -GC    Rt Elbow Flexion MMT, Gross Movement:  (5/5) normal  -GC    Rt Elbow Extension MMT, Gross Movement:  (5/5) normal  -GC    Rt Upper Extremity Comments   scaption strength is 4+/5  -GC      User Key  (r) = Recorded By, (t) = Taken By, (c) = Cosigned By    Initials Name Provider Type    Liam Godinez, PT Physical Therapist                      PT Assessment/Plan     Row Name 19 [id number]          PT  Assessment    Assessment Comments  Pt is doing pretty well with increased shoulder ROM and strength, but she still has some c/o pain that is interfering with her daily activities.  -GC        PT Plan    PT Plan Comments  Pt is to continue her HEP daily.  -GC       User Key  (r) = Recorded By, (t) = Taken By, (c) = Cosigned By    Initials Name Provider Type    GC Liam Henson, PT Physical Therapist          Modalities     Row Name 01/08/19 1230             Moist Heat    MH Applied  Yes  -GC      Location  left shoulder  -GC      Rx Minutes  10 mins  -GC      MH Prior to Rx  Yes  -GC         Iontophoresis 89904    Milliamps  3.5  -GC      MA/Min  40  -GC      Dexamethasone used  Yes  -GC      Patch Type  Medium greater tubercle/bicipital groove left shoulder  -GC        User Key  (r) = Recorded By, (t) = Taken By, (c) = Cosigned By    Initials Name Provider Type    GC Liam Henson, PT Physical Therapist          Exercises     Row Name 01/08/19 1230             Subjective Comments    Subjective Comments  Pt states her shoulder is feeling some better. She saw Dr. Pantoja who has recommended an MRI and possible PRP treatment.  -GC         Exercise 1    Exercise Name 1  Sidelying shoudler ER  -GC      Cueing 1  Verbal;Tactile  -GC      Reps 1  25  -GC      Additional Comments  3#  -GC         Exercise 2    Exercise Name 2  Scaption Down  -GC      Cueing 2  Verbal;Demo  -GC      Reps 2  25  -GC      Additional Comments  3#  -GC         Exercise 3    Exercise Name 3  Scaption Up  -GC      Cueing 3  Verbal;Demo  -GC      Reps 3  25  -GC      Additional Comments  3#  -GC         Exercise 4    Exercise Name 4  Rhomboid Lift off  -GC      Cueing 4  Verbal;Demo  -GC      Reps 4  25  -GC      Additional Comments  3#  -GC         Exercise 5    Exercise Name 5  Shoulder rows vs theraband  -GC      Cueing 5  Verbal;Demo  -GC         Exercise 6    Exercise Name 6  shoulder ext vs theraband  -GC      Cueing 6  Verbal;Demo  -GC          Exercise 7    Exercise Name 7  Sleeper stretch  -      Cueing 7  Verbal;Tactile  -      Reps 7  15  -GC      Time 7  10 secs  -GC         Exercise 8    Exercise Name 8  GH joint mobilizations  -      Cueing 8  Verbal  -GC      Time 8  5 min  -GC         Exercise 9    Exercise Name 9  Shoulder IR vs theraband  -      Cueing 9  Verbal;Demo  -GC      Reps 9  25  -GC      Additional Comments  silver  -        User Key  (r) = Recorded By, (t) = Taken By, (c) = Cosigned By    Initials Name Provider Type     Liam Henson, PT Physical Therapist                         PT OP Goals     Row Name 01/08/19 1230          PT Short Term Goals    STG Date to Achieve  12/19/18  -     STG 1  decrease left shoulder pain to 1/10 with activity.  -     STG 1 Progress  Partially Met;Ongoing;Progressing  -     STG 2  Increase left shoulder IR AROM to WFL with testing.  -     STG 2 Progress  Ongoing;Progressing  -     STG 3  Increase left shoulder girdle strength to at least 4+/5 all planes with testing.  -     STG 3 Progress  Met  -     STG 4  Pt will be independent with her HEP issued by this therapist.  -     STG 4 Progress  Met  -        Long Term Goals    LTG Date to Achieve  01/02/19  -     LTG 1  Decrease left shoulder pain to 0/10 with activity.  -     LTG 1 Progress  Ongoing;Progressing  -     LTG 2  Increase left shoulder girdles strength to 5/5 all planes with testing.  -     LTG 2 Progress  Partially Met;Ongoing;Progressing  -     LTG 3  Pt will be independent with all ADLsand have a Quick Dash score < 10.  -     LTG 3 Progress  Partially Met;Ongoing;Progressing  -       User Key  (r) = Recorded By, (t) = Taken By, (c) = Cosigned By    Initials Name Provider Type    GC Liam Henson, PT Physical Therapist                         Time Calculation:   Start Time: 1230  Stop Time: 1327  Time Calculation (min): 57 min  Therapy Suggested Charges     Code   Minutes Charges    None            Therapy Charges for Today     Code Description Service Date Service Provider Modifiers Qty    02830689198 HC PT THER PROC EA 15 MIN 1/8/2019 Liam Henson, PT GP 1    44277676099 HC PT IONTOPHORESIS EA 15 MIN 1/8/2019 Liam Henson, PT GP 1                    Liam Henson, PT  1/8/2019

## 2019-01-08 NOTE — PROGRESS NOTES
"Flores is a 63 y.o. year old female    Chief Complaint   Patient presents with   • Shoulder Pain     F/U on shoulder injection    • Knee Pain     Bilateral knee pain        History of Present Illness  HPI   F/u L shoulder pain. Improved but still having pain in certain positions - particularly if she tries a clean/press motion. Antioer bicep pain resolved, more posterior again.     Also new problem of bilat knee pain. Intermittent, wores with going up and down stairs. Anterior.     I have reviewed the patient's medical, family, and social history in detail and updated the computerized patient record.    Review of Systems   Constitutional: Negative for fever.   Musculoskeletal:        Per HPI   Skin: Negative for wound.   Neurological: Negative for numbness.   All other systems reviewed and are negative.      /72 (BP Location: Left arm, Patient Position: Sitting, Cuff Size: Adult)   Ht 160 cm (62.99\")   Wt 58.1 kg (128 lb)   BMI 22.68 kg/m²      Physical Exam    Vital signs reviewed.   General: No acute distress.  Eyes: conjunctiva clear; pupils equally round and reactive  ENT: external ears and nose atraumatic; oropharynx clear  CV: no peripheral edema, 2+ distal pulses  Resp: normal respiratory effort, no use of accessory muscles  Skin: no rashes or wounds; normal turgor  Psych: mood and affect appropriate; recent and remote memory intact  Neuro: sensation to light touch intact    MSK Exam:  Right Knee Exam     Muscle Strength   The patient has normal right knee strength.    Tenderness   The patient is experiencing tenderness in the patellar tendon.    Range of Motion   The patient has normal right knee ROM.    Tests   Krystina:  Medial - negative Lateral - negative  Varus: negative Valgus: negative  Lachman:  Anterior - negative    Posterior - negative      Left Knee Exam     Muscle Strength   The patient has normal left knee strength.    Tenderness   The patient is experiencing tenderness in the " patellar tendon.    Range of Motion   The patient has normal left knee ROM.    Tests   Krystina:  Medial - negative Lateral - negative  Varus: negative Valgus: negative  Lachman:  Anterior - negative    Posterior - negative      Left Shoulder Exam     Tenderness   The patient is experiencing no tenderness.     Range of Motion   The patient has normal left shoulder ROM.    Muscle Strength   The patient has normal left shoulder strength.    Tests   Apprehension: negative  Mcfadden test: positive  Impingement: positive  Drop arm: negative    Comments:  +Mcclain            Diagnoses and all orders for this visit:    Chronic left shoulder pain    Patellar tendinitis of both knees    I'm concerned that she has underlying labral tear int he shoulder. Recommend MRI to eval for definitive treatment - could be candidate for PRP not just surgery. She is going to consider and let me know.     Discussed HEP for bilat patellar tendonitis.       EMR Dragon/Transcription disclaimer:    Much of this encounter note is an electronic transcription/translation of spoken language to printed text.  The electronic translation of spoken language may permit erroneous, or at times, nonsensical words or phrases to be inadvertently transcribed.  Although I have reviewed the note for such errors some may still exist.

## 2019-01-14 ENCOUNTER — TELEPHONE (OUTPATIENT)
Dept: SPORTS MEDICINE | Facility: CLINIC | Age: 64
End: 2019-01-14

## 2019-01-16 DIAGNOSIS — M25.512 CHRONIC LEFT SHOULDER PAIN: Primary | ICD-10-CM

## 2019-01-16 DIAGNOSIS — G89.29 CHRONIC LEFT SHOULDER PAIN: Primary | ICD-10-CM

## 2019-01-29 ENCOUNTER — HOSPITAL ENCOUNTER (OUTPATIENT)
Dept: MRI IMAGING | Facility: HOSPITAL | Age: 64
Discharge: HOME | End: 2019-01-29

## 2019-01-29 ENCOUNTER — HOSPITAL ENCOUNTER (OUTPATIENT)
Dept: GENERAL RADIOLOGY | Facility: HOSPITAL | Age: 64
Discharge: HOME | End: 2019-01-29
Admitting: FAMILY MEDICINE

## 2019-01-29 DIAGNOSIS — M25.512 CHRONIC LEFT SHOULDER PAIN: ICD-10-CM

## 2019-01-29 DIAGNOSIS — G89.29 CHRONIC LEFT SHOULDER PAIN: ICD-10-CM

## 2019-01-29 PROCEDURE — 77002 NEEDLE LOCALIZATION BY XRAY: CPT

## 2019-01-29 PROCEDURE — 73222 MRI JOINT UPR EXTREM W/DYE: CPT

## 2019-01-29 PROCEDURE — 25010000002 IOPAMIDOL 61 % SOLUTION: Performed by: RADIOLOGY

## 2019-01-29 PROCEDURE — A9577 INJ MULTIHANCE: HCPCS | Performed by: RADIOLOGY

## 2019-01-29 PROCEDURE — 0 GADOBENATE DIMEGLUMINE 529 MG/ML SOLUTION: Performed by: RADIOLOGY

## 2019-01-29 RX ORDER — LIDOCAINE HYDROCHLORIDE 10 MG/ML
10 INJECTION, SOLUTION INFILTRATION; PERINEURAL ONCE
Status: COMPLETED | OUTPATIENT
Start: 2019-01-29 | End: 2019-01-29

## 2019-01-29 RX ADMIN — LIDOCAINE HYDROCHLORIDE 2 ML: 10 INJECTION, SOLUTION INFILTRATION; PERINEURAL at 10:05

## 2019-01-29 RX ADMIN — IOPAMIDOL 5 ML: 612 INJECTION, SOLUTION INTRAVENOUS at 10:05

## 2019-01-29 RX ADMIN — GADOBENATE DIMEGLUMINE 0.05 ML: 529 INJECTION, SOLUTION INTRAVENOUS at 10:05

## 2019-01-30 NOTE — PROGRESS NOTES
FYI - patient initially saw Nicolette then I saw her a few times. Looks like she needs to have this repaired.

## 2019-02-05 ENCOUNTER — OFFICE VISIT (OUTPATIENT)
Dept: ORTHOPEDIC SURGERY | Facility: CLINIC | Age: 64
End: 2019-02-05

## 2019-02-05 VITALS
WEIGHT: 130 LBS | DIASTOLIC BLOOD PRESSURE: 79 MMHG | HEART RATE: 67 BPM | HEIGHT: 63 IN | BODY MASS INDEX: 23.04 KG/M2 | SYSTOLIC BLOOD PRESSURE: 133 MMHG

## 2019-02-05 DIAGNOSIS — M75.122 COMPLETE TEAR OF LEFT ROTATOR CUFF: ICD-10-CM

## 2019-02-05 DIAGNOSIS — R52 PAIN: Primary | ICD-10-CM

## 2019-02-05 DIAGNOSIS — M75.22 BICEPS TENDINITIS OF LEFT UPPER EXTREMITY: ICD-10-CM

## 2019-02-05 DIAGNOSIS — M19.012 ARTHRITIS OF LEFT ACROMIOCLAVICULAR JOINT: ICD-10-CM

## 2019-02-05 DIAGNOSIS — M77.01 MEDIAL EPICONDYLITIS OF ELBOW, RIGHT: ICD-10-CM

## 2019-02-05 PROCEDURE — 73080 X-RAY EXAM OF ELBOW: CPT | Performed by: ORTHOPAEDIC SURGERY

## 2019-02-05 PROCEDURE — 99203 OFFICE O/P NEW LOW 30 MIN: CPT | Performed by: ORTHOPAEDIC SURGERY

## 2019-02-05 NOTE — PROGRESS NOTES
Subjective:     Patient ID: Flores Seaman is a 63 y.o. female.    Chief Complaint:  Left shoulder pain, new issue to examiner  Right medial elbow pain, new issue to examiner  History of Present Illness  Flores Seaman presents to clinic today for evaluation of left shoulder pain that she has noted for the last 2 years, denies any specific injury prior to the onset of her pain, she has completed physical therapy with minimal improvement in her pain.  She works as a respiratory therapist.  Rates current level of pain is a 6-8 out of 10 and describes as sharp in nature, exacerbated at this point time with resisted and overhead activities primarily.  Denies associated numbness or tingling, mild radiation of pain down the lateral arm, localizes pain to the anterolateral aspect of the shoulder at this time.  Denies associated neck pain.  No significant provement with prior injections.    In regards to her right elbow, patient notes pain along medial arm noted over the last 4-6 weeks, denies any injury prior to the onset of her pain, rates pain as a 4-5 out of 10 and aching in nature, exacerbated with resisted gripping activities and use of wrist and hand, minimal improvement with soft tissue massage and rest, denies any radiation of pain to, does note some occasional tingling and burning type symptoms into the medial forearm and small finger of right hand.     Social History     Occupational History   • Not on file   Tobacco Use   • Smoking status: Never Smoker   • Smokeless tobacco: Never Used   Substance and Sexual Activity   • Alcohol use: Yes   • Drug use: No   • Sexual activity: Defer      History reviewed. No pertinent past medical history.  History reviewed. No pertinent surgical history.    Family History   Problem Relation Age of Onset   • Cancer Father    • Breast cancer Neg Hx          Review of Systems   Constitutional: Negative for chills, diaphoresis, fever and unexpected weight change.   HENT:  "Negative for hearing loss, nosebleeds, sore throat and tinnitus.    Eyes: Negative for pain and visual disturbance.   Respiratory: Negative for cough, shortness of breath and wheezing.    Cardiovascular: Negative for chest pain and palpitations.   Gastrointestinal: Negative for abdominal pain, diarrhea, nausea and vomiting.   Endocrine: Negative for cold intolerance, heat intolerance and polydipsia.   Genitourinary: Negative for difficulty urinating, dysuria and hematuria.   Musculoskeletal: Positive for arthralgias and myalgias. Negative for joint swelling.   Skin: Negative for rash and wound.   Allergic/Immunologic: Negative for environmental allergies.   Neurological: Negative for dizziness, syncope and numbness.   Hematological: Does not bruise/bleed easily.   Psychiatric/Behavioral: Negative for dysphoric mood and sleep disturbance. The patient is not nervous/anxious.            Objective:  Vitals:    02/05/19 1444   BP: 133/79   BP Location: Left arm   Pulse: 67   Weight: 59 kg (130 lb)   Height: 160 cm (63\")         02/05/19  1444   Weight: 59 kg (130 lb)     Body mass index is 23.03 kg/m².  Physical Exam    Vital signs reviewed.   General: No acute distress, alert and oriented  Eyes: conjunctiva clear; pupils equally round and reactive  ENT: external ears and nose atraumatic; oropharynx clear  CV: no peripheral edema  Resp: normal respiratory effort  Skin: no rashes or wounds; normal turgor  Psych: mood and affect appropriate; recent and remote memory intact          Ortho Exam     Left shoulder-active forward flexion 165 degrees, external rotation 50 degrees, internal rotation T12, 4 out of 5 strength on resisted forward flexion and external rotation, 4+ out of 5 strength above assessment negative bear hug sign.  Moderate tenderness over, equivocal joint with mildly positive crossarm test.  Brisk cap refill all digits, 2+ radial pulse left wrist.  Negative drop arm test, negative external rotation lag sign. "  Moderately positive Yergason speeds, equivocal Assumption's.    Right elbow-maximal tenderness palpation along medial epicondyle, stable to varus and valgus stress 0 and 30 degrees, active range of motion elbow 0-150 degrees with 5 out of 5 strength.  Mild increased pain on resisted wrist flexion and finger  against resistance.  Mildly positive Tinel's over ulnar nerve at cubital tunnel.  Brisk cap refill all digits, 2+ radial pulse right wrist.  Positive sensation light touch all distributions right hand symmetric to the left.  Imaging:  Right Elbow X-Ray  Indication: Pain  Views: AP and Lateral views    Findings:  No fracture  No bony lesion  Normal soft tissues  Normal joint spaces    No prior studies were available for comparison.    Mri Shoulder Left Arthrogram    Result Date: 1/29/2019  Impression: Near full-thickness left supraspinatus tendon tear. There is no tendon retraction or muscle atrophy. There is some degenerative change at the acromioclavicular joint with mild undersurface osseous prominence. No other internal derangement is present.  This report was finalized on 1/29/2019 4:30 PM by Dr. Dave Tejada M.D.      Fl Contrast Injection Ct / Mri    Result Date: 1/29/2019  Impression: Pre-MRI arthrogram injection of the left shoulder was well tolerated. The MRI is reported separately.  This report was finalized on 1/29/2019 2:45 PM by Dr. Dave Tejada M.D.      .  Outside MRI left shoulder including review of images as well as radiology report indicates high-grade near full-thickness tear of the supraspinatus with moderate degenerative changes acromial clavicular joint.    Assessment:        1. Pain    2. Complete tear of left rotator cuff    3. Arthritis of left acromioclavicular joint    4. Biceps tendinitis of left upper extremity           Plan:          Discussed treatment options at length with patient at today's visit.  Reviewed with patient findings from MRI left shoulder, discussed with  her that I would recommend consideration for surgical treatment given the high-grade nature of her tear as well as the failure of conservative treatment over the course of 2 years including injections and therapy.  This does affect her on a daily basis with her job as well.  She wants to consider this further and will contact us back if she does wish to proceed with surgery in regards to her left shoulder.    In regards to her right elbow we discussed options for soft tissue massage, and temperature medications over-the-counter, use of a forearm strap, and activity modification as tolerated.  She is in agreement with proceeding with these measures currently, declined option for medial elbow injection.    Flores Seaman was in agreement with plan and had all questions answered.     Orders:  Orders Placed This Encounter   Procedures   • XR Elbow 3+ View Right       Medications:  No orders of the defined types were placed in this encounter.      Followup:  No Follow-up on file.    Flores was seen today for pain.    Diagnoses and all orders for this visit:    Pain  -     XR Elbow 3+ View Right    Complete tear of left rotator cuff    Arthritis of left acromioclavicular joint    Biceps tendinitis of left upper extremity          Dictated utilizing Dragon dictation

## 2019-02-18 PROBLEM — M77.01 MEDIAL EPICONDYLITIS OF ELBOW, RIGHT: Status: ACTIVE | Noted: 2019-02-18

## 2019-04-22 ENCOUNTER — APPOINTMENT (OUTPATIENT)
Dept: PREADMISSION TESTING | Facility: HOSPITAL | Age: 64
End: 2019-04-22

## 2019-04-22 VITALS
TEMPERATURE: 96.8 F | DIASTOLIC BLOOD PRESSURE: 75 MMHG | HEART RATE: 68 BPM | BODY MASS INDEX: 23.71 KG/M2 | HEIGHT: 63 IN | WEIGHT: 133.8 LBS | RESPIRATION RATE: 16 BRPM | OXYGEN SATURATION: 100 % | SYSTOLIC BLOOD PRESSURE: 106 MMHG

## 2019-04-22 LAB
ALBUMIN SERPL-MCNC: 4.2 G/DL (ref 3.5–5.2)
ALBUMIN/GLOB SERPL: 1.9 G/DL
ALP SERPL-CCNC: 52 U/L (ref 39–117)
ALT SERPL W P-5'-P-CCNC: 16 U/L (ref 1–33)
ANION GAP SERPL CALCULATED.3IONS-SCNC: 11.7 MMOL/L
AST SERPL-CCNC: 44 U/L (ref 1–32)
BACTERIA UR QL AUTO: NORMAL /HPF
BASOPHILS # BLD AUTO: 0.04 10*3/MM3 (ref 0–0.2)
BASOPHILS NFR BLD AUTO: 0.9 % (ref 0–1.5)
BILIRUB SERPL-MCNC: 0.2 MG/DL (ref 0.2–1.2)
BILIRUB UR QL STRIP: NEGATIVE
BUN BLD-MCNC: 15 MG/DL (ref 8–23)
BUN/CREAT SERPL: 24.2 (ref 7–25)
CALCIUM SPEC-SCNC: 8.7 MG/DL (ref 8.6–10.5)
CHLORIDE SERPL-SCNC: 104 MMOL/L (ref 98–107)
CLARITY UR: CLEAR
CO2 SERPL-SCNC: 26.3 MMOL/L (ref 22–29)
COLOR UR: YELLOW
CREAT BLD-MCNC: 0.62 MG/DL (ref 0.57–1)
DEPRECATED RDW RBC AUTO: 42.8 FL (ref 37–54)
EOSINOPHIL # BLD AUTO: 0.18 10*3/MM3 (ref 0–0.4)
EOSINOPHIL NFR BLD AUTO: 4.1 % (ref 0.3–6.2)
ERYTHROCYTE [DISTWIDTH] IN BLOOD BY AUTOMATED COUNT: 14.6 % (ref 12.3–15.4)
GFR SERPL CREATININE-BSD FRML MDRD: 97 ML/MIN/1.73
GLOBULIN UR ELPH-MCNC: 2.2 GM/DL
GLUCOSE BLD-MCNC: 91 MG/DL (ref 65–99)
GLUCOSE UR STRIP-MCNC: NEGATIVE MG/DL
HCT VFR BLD AUTO: 40.5 % (ref 34–46.6)
HGB BLD-MCNC: 12.4 G/DL (ref 12–15.9)
HGB UR QL STRIP.AUTO: NEGATIVE
HYALINE CASTS UR QL AUTO: NORMAL /LPF
IMM GRANULOCYTES # BLD AUTO: 0.01 10*3/MM3 (ref 0–0.05)
IMM GRANULOCYTES NFR BLD AUTO: 0.2 % (ref 0–0.5)
KETONES UR QL STRIP: NEGATIVE
LEUKOCYTE ESTERASE UR QL STRIP.AUTO: ABNORMAL
LYMPHOCYTES # BLD AUTO: 1.51 10*3/MM3 (ref 0.7–3.1)
LYMPHOCYTES NFR BLD AUTO: 34 % (ref 19.6–45.3)
MCH RBC QN AUTO: 24.8 PG (ref 26.6–33)
MCHC RBC AUTO-ENTMCNC: 30.6 G/DL (ref 31.5–35.7)
MCV RBC AUTO: 81 FL (ref 79–97)
MONOCYTES # BLD AUTO: 0.4 10*3/MM3 (ref 0.1–0.9)
MONOCYTES NFR BLD AUTO: 9 % (ref 5–12)
NEUTROPHILS # BLD AUTO: 2.3 10*3/MM3 (ref 1.7–7)
NEUTROPHILS NFR BLD AUTO: 51.8 % (ref 42.7–76)
NITRITE UR QL STRIP: NEGATIVE
NRBC BLD AUTO-RTO: 0 /100 WBC (ref 0–0.2)
PH UR STRIP.AUTO: 7 [PH] (ref 5–8)
PLATELET # BLD AUTO: 183 10*3/MM3 (ref 140–450)
PMV BLD AUTO: 11.5 FL (ref 6–12)
POTASSIUM BLD-SCNC: 4.1 MMOL/L (ref 3.5–5.2)
PROT SERPL-MCNC: 6.4 G/DL (ref 6–8.5)
PROT UR QL STRIP: NEGATIVE
RBC # BLD AUTO: 5 10*6/MM3 (ref 3.77–5.28)
RBC # UR: NORMAL /HPF
REF LAB TEST METHOD: NORMAL
SODIUM BLD-SCNC: 142 MMOL/L (ref 136–145)
SP GR UR STRIP: 1.02 (ref 1–1.03)
SQUAMOUS #/AREA URNS HPF: NORMAL /HPF
UROBILINOGEN UR QL STRIP: ABNORMAL
WBC NRBC COR # BLD: 4.44 10*3/MM3 (ref 3.4–10.8)
WBC UR QL AUTO: NORMAL /HPF

## 2019-04-22 PROCEDURE — 85025 COMPLETE CBC W/AUTO DIFF WBC: CPT | Performed by: ORTHOPAEDIC SURGERY

## 2019-04-22 PROCEDURE — 93005 ELECTROCARDIOGRAM TRACING: CPT

## 2019-04-22 PROCEDURE — 81001 URINALYSIS AUTO W/SCOPE: CPT | Performed by: ORTHOPAEDIC SURGERY

## 2019-04-22 PROCEDURE — 80053 COMPREHEN METABOLIC PANEL: CPT | Performed by: ORTHOPAEDIC SURGERY

## 2019-04-22 PROCEDURE — 36415 COLL VENOUS BLD VENIPUNCTURE: CPT

## 2019-04-22 PROCEDURE — 93010 ELECTROCARDIOGRAM REPORT: CPT | Performed by: INTERNAL MEDICINE

## 2019-04-22 RX ORDER — SODIUM PHOSPHATE,MONO-DIBASIC 19G-7G/118
1 ENEMA (ML) RECTAL DAILY
COMMUNITY

## 2019-04-22 RX ORDER — PSEUDOEPHEDRINE HCL 120 MG/1
120 TABLET, FILM COATED, EXTENDED RELEASE ORAL AS NEEDED
COMMUNITY
End: 2021-06-23

## 2019-04-22 NOTE — DISCHARGE INSTRUCTIONS
Take the following medications the morning of surgery with a small sip of water:        General Instructions:  • Do not eat solid food after midnight the night before surgery.  • You may drink clear liquids day of surgery but must stop at least one hour before your hospital arrival time.  • It is beneficial for you to have a clear drink that contains carbohydrates the day of surgery.  We suggest a 12 to 20 ounce bottle of Gatorade or Powerade for non-diabetic patients or a 12 to 20 ounce bottle of G2 or Powerade Zero for diabetic patients. (Pediatric patients, are not advised to drink a 12 to 20 ounce carbohydrate drink)    Clear liquids are liquids you can see through.  Nothing red in color.     Plain water                               Sports drinks  Sodas                                   Gelatin (Jell-O)  Fruit juices without pulp such as white grape juice and apple juice  Popsicles that contain no fruit or yogurt  Tea or coffee (no cream or milk added)  Gatorade / Powerade  G2 / Powerade Zero    • Infants may have breast milk up to four hours before surgery.  • Infants drinking formula may drink formula up to six hours before surgery.   • Patients who avoid smoking, chewing tobacco and alcohol for 4 weeks prior to surgery have a reduced risk of post-operative complications.  Quit smoking as many days before surgery as you can.  • Do not smoke, use chewing tobacco or drink alcohol the day of surgery.   • If applicable bring your C-PAP/ BI-PAP machine.  • Bring any papers given to you in the doctor’s office.  • Wear clean comfortable clothes and socks.  • Do not wear contact lenses, false eyelashes or make-up.  Bring a case for your glasses.   • Bring crutches or walker if applicable.  • Remove all piercings.  Leave jewelry and any other valuables at home.  • Hair extensions with metal clips must be removed prior to surgery.  • The Pre-Admission Testing nurse will instruct you to bring medications if unable to  obtain an accurate list in Pre-Admission Testing.            Preventing a Surgical Site Infection:  • For 2 to 3 days before surgery, avoid shaving with a razor because the razor can irritate skin and make it easier to develop an infection.    • Any areas of open skin can increase the risk of a post-operative wound infection by allowing bacteria to enter and travel throughout the body.  Notify your surgeon if you have any skin wounds / rashes even if it is not near the expected surgical site.  The area will need assessed to determine if surgery should be delayed until it is healed.  • The night prior to surgery sleep in a clean bed with clean clothing.  Do not allow pets to sleep with you.  • Shower on the morning of surgery using a fresh bar of anti-bacterial soap (such as Dial) and clean washcloth.  Dry with a clean towel and dress in clean clothing.  • Ask your surgeon if you will be receiving antibiotics prior to surgery.  • Make sure you, your family, and all healthcare providers clean their hands with soap and water or an alcohol based hand  before caring for you or your wound.    Day of surgery:  Upon arrival, a Pre-op nurse and Anesthesiologist will review your health history, obtain vital signs, and answer questions you may have.  The only belongings needed at this time will be your home medications and if applicable your C-PAP/BI-PAP machine.  If you are staying overnight your family can leave the rest of your belongings in the car and bring them to your room later.  A Pre-op nurse will start an IV and you may receive medication in preparation for surgery, including something to help you relax.  Your family will be able to see you in the Pre-op area.  While you are in surgery your family should notify the waiting room  if they leave the waiting room area and provide a contact phone number.    Please be aware that surgery does come with discomfort.  We want to make every effort to  control your discomfort so please discuss any uncontrolled symptoms with your nurse.   Your doctor will most likely have prescribed pain medications.      If you are going home after surgery you will receive individualized written care instructions before being discharged.  A responsible adult must drive you to and from the hospital on the day of your surgery and stay with you for 24 hours.    If you are staying overnight following surgery, you will be transported to your hospital room following the recovery period.  [facility] has all private rooms.    You have received a list of surgical assistants for your reference.  If you have any questions please call Pre-Admission Testing at [phone].  Deductibles and co-payments are collected on the day of service. Please be prepared to pay the required co-pay, deductible or deposit on the day of service as defined by your plan.

## 2019-04-29 ENCOUNTER — ANESTHESIA EVENT (OUTPATIENT)
Dept: PERIOP | Facility: HOSPITAL | Age: 64
End: 2019-04-29

## 2019-04-30 ENCOUNTER — ANESTHESIA (OUTPATIENT)
Dept: PERIOP | Facility: HOSPITAL | Age: 64
End: 2019-04-30

## 2019-04-30 ENCOUNTER — HOSPITAL ENCOUNTER (OUTPATIENT)
Facility: HOSPITAL | Age: 64
Setting detail: HOSPITAL OUTPATIENT SURGERY
Discharge: HOME | End: 2019-04-30
Attending: ORTHOPAEDIC SURGERY | Admitting: ORTHOPAEDIC SURGERY

## 2019-04-30 VITALS
HEART RATE: 74 BPM | DIASTOLIC BLOOD PRESSURE: 85 MMHG | RESPIRATION RATE: 16 BRPM | WEIGHT: 131.61 LBS | BODY MASS INDEX: 23.31 KG/M2 | TEMPERATURE: 97.6 F | SYSTOLIC BLOOD PRESSURE: 128 MMHG | OXYGEN SATURATION: 97 %

## 2019-04-30 PROCEDURE — L3670 SO ACRO/CLAV CAN WEB PRE OTS: HCPCS | Performed by: ORTHOPAEDIC SURGERY

## 2019-04-30 PROCEDURE — 25010000002 MIDAZOLAM PER 1 MG: Performed by: ANESTHESIOLOGY

## 2019-04-30 PROCEDURE — 25010000002 NEOSTIGMINE PER 0.5 MG: Performed by: NURSE ANESTHETIST, CERTIFIED REGISTERED

## 2019-04-30 PROCEDURE — 25010000002 FENTANYL CITRATE (PF) 100 MCG/2ML SOLUTION: Performed by: NURSE ANESTHETIST, CERTIFIED REGISTERED

## 2019-04-30 PROCEDURE — 25010000003 CEFAZOLIN IN DEXTROSE 2-4 GM/100ML-% SOLUTION: Performed by: ORTHOPAEDIC SURGERY

## 2019-04-30 PROCEDURE — 25010000002 DEXAMETHASONE PER 1 MG: Performed by: ANESTHESIOLOGY

## 2019-04-30 PROCEDURE — 25010000002 FENTANYL CITRATE (PF) 100 MCG/2ML SOLUTION: Performed by: ANESTHESIOLOGY

## 2019-04-30 PROCEDURE — C1713 ANCHOR/SCREW BN/BN,TIS/BN: HCPCS | Performed by: ORTHOPAEDIC SURGERY

## 2019-04-30 PROCEDURE — 25010000002 EPINEPHRINE PER 0.1 MG: Performed by: ORTHOPAEDIC SURGERY

## 2019-04-30 PROCEDURE — 25010000002 PROPOFOL 10 MG/ML EMULSION: Performed by: NURSE ANESTHETIST, CERTIFIED REGISTERED

## 2019-04-30 PROCEDURE — 25010000002 ONDANSETRON PER 1 MG: Performed by: NURSE ANESTHETIST, CERTIFIED REGISTERED

## 2019-04-30 DEVICE — IMPLANTABLE DEVICE
Type: IMPLANTABLE DEVICE | Site: SHOULDER | Status: FUNCTIONAL
Brand: BIOWICK® SURELOCK®

## 2019-04-30 DEVICE — IMPLANTABLE DEVICE
Type: IMPLANTABLE DEVICE | Site: SHOULDER | Status: FUNCTIONAL
Brand: QUATTRO® LINK SP KNOTLESS ANCHOR

## 2019-04-30 DEVICE — SUT NONABS MAXBRAID/PE NMBR2 HC5 38IN BLU 900334: Type: IMPLANTABLE DEVICE | Site: SHOULDER | Status: FUNCTIONAL

## 2019-04-30 RX ORDER — FENTANYL CITRATE 50 UG/ML
50 INJECTION, SOLUTION INTRAMUSCULAR; INTRAVENOUS
Status: DISCONTINUED | OUTPATIENT
Start: 2019-04-30 | End: 2019-04-30 | Stop reason: HOSPADM

## 2019-04-30 RX ORDER — ROCURONIUM BROMIDE 10 MG/ML
INJECTION, SOLUTION INTRAVENOUS AS NEEDED
Status: DISCONTINUED | OUTPATIENT
Start: 2019-04-30 | End: 2019-04-30 | Stop reason: SURG

## 2019-04-30 RX ORDER — PROPOFOL 10 MG/ML
VIAL (ML) INTRAVENOUS AS NEEDED
Status: DISCONTINUED | OUTPATIENT
Start: 2019-04-30 | End: 2019-04-30 | Stop reason: SURG

## 2019-04-30 RX ORDER — HYDROMORPHONE HYDROCHLORIDE 1 MG/ML
0.5 INJECTION, SOLUTION INTRAMUSCULAR; INTRAVENOUS; SUBCUTANEOUS
Status: DISCONTINUED | OUTPATIENT
Start: 2019-04-30 | End: 2019-04-30 | Stop reason: HOSPADM

## 2019-04-30 RX ORDER — DEXAMETHASONE SODIUM PHOSPHATE 4 MG/ML
INJECTION, SOLUTION INTRA-ARTICULAR; INTRALESIONAL; INTRAMUSCULAR; INTRAVENOUS; SOFT TISSUE
Status: COMPLETED | OUTPATIENT
Start: 2019-04-30 | End: 2019-04-30

## 2019-04-30 RX ORDER — LIDOCAINE HYDROCHLORIDE 20 MG/ML
INJECTION, SOLUTION EPIDURAL; INFILTRATION; INTRACAUDAL; PERINEURAL
Status: COMPLETED | OUTPATIENT
Start: 2019-04-30 | End: 2019-04-30

## 2019-04-30 RX ORDER — PROMETHAZINE HYDROCHLORIDE 25 MG/ML
12.5 INJECTION, SOLUTION INTRAMUSCULAR; INTRAVENOUS ONCE AS NEEDED
Status: DISCONTINUED | OUTPATIENT
Start: 2019-04-30 | End: 2019-04-30 | Stop reason: HOSPADM

## 2019-04-30 RX ORDER — LIDOCAINE HYDROCHLORIDE 10 MG/ML
0.5 INJECTION, SOLUTION EPIDURAL; INFILTRATION; INTRACAUDAL; PERINEURAL ONCE AS NEEDED
Status: DISCONTINUED | OUTPATIENT
Start: 2019-04-30 | End: 2019-04-30 | Stop reason: HOSPADM

## 2019-04-30 RX ORDER — PROMETHAZINE HYDROCHLORIDE 25 MG/1
25 TABLET ORAL ONCE AS NEEDED
Status: DISCONTINUED | OUTPATIENT
Start: 2019-04-30 | End: 2019-04-30 | Stop reason: HOSPADM

## 2019-04-30 RX ORDER — MIDAZOLAM HYDROCHLORIDE 1 MG/ML
2 INJECTION INTRAMUSCULAR; INTRAVENOUS
Status: DISCONTINUED | OUTPATIENT
Start: 2019-04-30 | End: 2019-04-30 | Stop reason: HOSPADM

## 2019-04-30 RX ORDER — SODIUM CHLORIDE, SODIUM LACTATE, POTASSIUM CHLORIDE, AND CALCIUM CHLORIDE .6; .31; .03; .02 G/100ML; G/100ML; G/100ML; G/100ML
IRRIGANT IRRIGATION AS NEEDED
Status: DISCONTINUED | OUTPATIENT
Start: 2019-04-30 | End: 2019-04-30 | Stop reason: HOSPADM

## 2019-04-30 RX ORDER — NALOXONE HCL 0.4 MG/ML
0.2 VIAL (ML) INJECTION AS NEEDED
Status: DISCONTINUED | OUTPATIENT
Start: 2019-04-30 | End: 2019-04-30 | Stop reason: HOSPADM

## 2019-04-30 RX ORDER — OXYCODONE AND ACETAMINOPHEN 7.5; 325 MG/1; MG/1
1 TABLET ORAL ONCE AS NEEDED
Status: DISCONTINUED | OUTPATIENT
Start: 2019-04-30 | End: 2019-04-30 | Stop reason: HOSPADM

## 2019-04-30 RX ORDER — PROMETHAZINE HYDROCHLORIDE 25 MG/1
25 SUPPOSITORY RECTAL ONCE AS NEEDED
Status: DISCONTINUED | OUTPATIENT
Start: 2019-04-30 | End: 2019-04-30 | Stop reason: HOSPADM

## 2019-04-30 RX ORDER — EPHEDRINE SULFATE 50 MG/ML
INJECTION, SOLUTION INTRAVENOUS AS NEEDED
Status: DISCONTINUED | OUTPATIENT
Start: 2019-04-30 | End: 2019-04-30 | Stop reason: SURG

## 2019-04-30 RX ORDER — GLYCOPYRROLATE 0.2 MG/ML
INJECTION INTRAMUSCULAR; INTRAVENOUS AS NEEDED
Status: DISCONTINUED | OUTPATIENT
Start: 2019-04-30 | End: 2019-04-30 | Stop reason: SURG

## 2019-04-30 RX ORDER — BUPIVACAINE HYDROCHLORIDE 5 MG/ML
INJECTION, SOLUTION EPIDURAL; INTRACAUDAL
Status: COMPLETED | OUTPATIENT
Start: 2019-04-30 | End: 2019-04-30

## 2019-04-30 RX ORDER — SODIUM CHLORIDE 0.9 % (FLUSH) 0.9 %
1-10 SYRINGE (ML) INJECTION AS NEEDED
Status: DISCONTINUED | OUTPATIENT
Start: 2019-04-30 | End: 2019-04-30 | Stop reason: HOSPADM

## 2019-04-30 RX ORDER — ACETAMINOPHEN 325 MG/1
650 TABLET ORAL ONCE AS NEEDED
Status: DISCONTINUED | OUTPATIENT
Start: 2019-04-30 | End: 2019-04-30 | Stop reason: HOSPADM

## 2019-04-30 RX ORDER — HYDRALAZINE HYDROCHLORIDE 20 MG/ML
5 INJECTION INTRAMUSCULAR; INTRAVENOUS
Status: DISCONTINUED | OUTPATIENT
Start: 2019-04-30 | End: 2019-04-30 | Stop reason: HOSPADM

## 2019-04-30 RX ORDER — DIPHENHYDRAMINE HCL 25 MG
25 CAPSULE ORAL EVERY 6 HOURS PRN
Status: DISCONTINUED | OUTPATIENT
Start: 2019-04-30 | End: 2019-04-30 | Stop reason: HOSPADM

## 2019-04-30 RX ORDER — LIDOCAINE HYDROCHLORIDE 20 MG/ML
INJECTION, SOLUTION INFILTRATION; PERINEURAL AS NEEDED
Status: DISCONTINUED | OUTPATIENT
Start: 2019-04-30 | End: 2019-04-30 | Stop reason: SURG

## 2019-04-30 RX ORDER — FAMOTIDINE 10 MG/ML
20 INJECTION, SOLUTION INTRAVENOUS ONCE
Status: COMPLETED | OUTPATIENT
Start: 2019-04-30 | End: 2019-04-30

## 2019-04-30 RX ORDER — BUPIVACAINE HYDROCHLORIDE AND EPINEPHRINE 5; 5 MG/ML; UG/ML
INJECTION, SOLUTION PERINEURAL AS NEEDED
Status: DISCONTINUED | OUTPATIENT
Start: 2019-04-30 | End: 2019-04-30 | Stop reason: HOSPADM

## 2019-04-30 RX ORDER — FLUMAZENIL 0.1 MG/ML
0.2 INJECTION INTRAVENOUS AS NEEDED
Status: DISCONTINUED | OUTPATIENT
Start: 2019-04-30 | End: 2019-04-30 | Stop reason: HOSPADM

## 2019-04-30 RX ORDER — DIPHENHYDRAMINE HCL 25 MG
25 CAPSULE ORAL
Status: DISCONTINUED | OUTPATIENT
Start: 2019-04-30 | End: 2019-04-30 | Stop reason: HOSPADM

## 2019-04-30 RX ORDER — EPHEDRINE SULFATE 50 MG/ML
5 INJECTION, SOLUTION INTRAVENOUS ONCE AS NEEDED
Status: DISCONTINUED | OUTPATIENT
Start: 2019-04-30 | End: 2019-04-30 | Stop reason: HOSPADM

## 2019-04-30 RX ORDER — FENTANYL CITRATE 50 UG/ML
INJECTION, SOLUTION INTRAMUSCULAR; INTRAVENOUS AS NEEDED
Status: DISCONTINUED | OUTPATIENT
Start: 2019-04-30 | End: 2019-04-30 | Stop reason: SURG

## 2019-04-30 RX ORDER — HYDROCODONE BITARTRATE AND ACETAMINOPHEN 7.5; 325 MG/1; MG/1
1 TABLET ORAL ONCE AS NEEDED
Status: DISCONTINUED | OUTPATIENT
Start: 2019-04-30 | End: 2019-04-30 | Stop reason: HOSPADM

## 2019-04-30 RX ORDER — ACETAMINOPHEN 650 MG/1
650 SUPPOSITORY RECTAL ONCE AS NEEDED
Status: DISCONTINUED | OUTPATIENT
Start: 2019-04-30 | End: 2019-04-30 | Stop reason: HOSPADM

## 2019-04-30 RX ORDER — ONDANSETRON 2 MG/ML
INJECTION INTRAMUSCULAR; INTRAVENOUS AS NEEDED
Status: DISCONTINUED | OUTPATIENT
Start: 2019-04-30 | End: 2019-04-30 | Stop reason: SURG

## 2019-04-30 RX ORDER — LABETALOL HYDROCHLORIDE 5 MG/ML
5 INJECTION, SOLUTION INTRAVENOUS
Status: DISCONTINUED | OUTPATIENT
Start: 2019-04-30 | End: 2019-04-30 | Stop reason: HOSPADM

## 2019-04-30 RX ORDER — CEFAZOLIN SODIUM 2 G/100ML
2 INJECTION, SOLUTION INTRAVENOUS ONCE
Status: COMPLETED | OUTPATIENT
Start: 2019-04-30 | End: 2019-04-30

## 2019-04-30 RX ORDER — MIDAZOLAM HYDROCHLORIDE 1 MG/ML
1 INJECTION INTRAMUSCULAR; INTRAVENOUS
Status: DISCONTINUED | OUTPATIENT
Start: 2019-04-30 | End: 2019-04-30 | Stop reason: HOSPADM

## 2019-04-30 RX ORDER — ONDANSETRON 2 MG/ML
4 INJECTION INTRAMUSCULAR; INTRAVENOUS ONCE AS NEEDED
Status: DISCONTINUED | OUTPATIENT
Start: 2019-04-30 | End: 2019-04-30 | Stop reason: HOSPADM

## 2019-04-30 RX ORDER — SODIUM CHLORIDE, SODIUM LACTATE, POTASSIUM CHLORIDE, CALCIUM CHLORIDE 600; 310; 30; 20 MG/100ML; MG/100ML; MG/100ML; MG/100ML
9 INJECTION, SOLUTION INTRAVENOUS CONTINUOUS
Status: DISCONTINUED | OUTPATIENT
Start: 2019-04-30 | End: 2019-04-30 | Stop reason: HOSPADM

## 2019-04-30 RX ADMIN — EPHEDRINE SULFATE 10 MG: 50 INJECTION INTRAMUSCULAR; INTRAVENOUS; SUBCUTANEOUS at 07:24

## 2019-04-30 RX ADMIN — FENTANYL CITRATE 50 MCG: 50 INJECTION, SOLUTION INTRAMUSCULAR; INTRAVENOUS at 06:25

## 2019-04-30 RX ADMIN — SODIUM CHLORIDE, POTASSIUM CHLORIDE, SODIUM LACTATE AND CALCIUM CHLORIDE: 600; 310; 30; 20 INJECTION, SOLUTION INTRAVENOUS at 06:44

## 2019-04-30 RX ADMIN — ONDANSETRON 4 MG: 2 INJECTION INTRAMUSCULAR; INTRAVENOUS at 07:25

## 2019-04-30 RX ADMIN — ROCURONIUM BROMIDE 30 MG: 10 INJECTION, SOLUTION INTRAVENOUS at 06:47

## 2019-04-30 RX ADMIN — BUPIVACAINE HYDROCHLORIDE 15 ML: 5 INJECTION, SOLUTION EPIDURAL; INTRACAUDAL; PERINEURAL at 06:22

## 2019-04-30 RX ADMIN — CEFAZOLIN SODIUM 2 G: 2 INJECTION, SOLUTION INTRAVENOUS at 06:50

## 2019-04-30 RX ADMIN — Medication 1 MG: at 07:59

## 2019-04-30 RX ADMIN — GLYCOPYRROLATE 0.6 MG: 0.2 INJECTION INTRAMUSCULAR; INTRAVENOUS at 07:47

## 2019-04-30 RX ADMIN — PROPOFOL 120 MG: 10 INJECTION, EMULSION INTRAVENOUS at 06:47

## 2019-04-30 RX ADMIN — LIDOCAINE HYDROCHLORIDE 60 MG: 20 INJECTION, SOLUTION INFILTRATION; PERINEURAL at 06:47

## 2019-04-30 RX ADMIN — FAMOTIDINE 20 MG: 10 INJECTION INTRAVENOUS at 06:23

## 2019-04-30 RX ADMIN — LIDOCAINE HYDROCHLORIDE 5 ML: 20 INJECTION, SOLUTION EPIDURAL; INFILTRATION; INTRACAUDAL; PERINEURAL at 06:22

## 2019-04-30 RX ADMIN — EPHEDRINE SULFATE 10 MG: 50 INJECTION INTRAMUSCULAR; INTRAVENOUS; SUBCUTANEOUS at 07:16

## 2019-04-30 RX ADMIN — FENTANYL CITRATE 50 MCG: 50 INJECTION INTRAMUSCULAR; INTRAVENOUS at 06:45

## 2019-04-30 RX ADMIN — MIDAZOLAM 2 MG: 1 INJECTION INTRAMUSCULAR; INTRAVENOUS at 06:25

## 2019-04-30 RX ADMIN — SODIUM CHLORIDE, POTASSIUM CHLORIDE, SODIUM LACTATE AND CALCIUM CHLORIDE 9 ML/HR: 600; 310; 30; 20 INJECTION, SOLUTION INTRAVENOUS at 06:05

## 2019-04-30 RX ADMIN — FENTANYL CITRATE 25 MCG: 50 INJECTION INTRAMUSCULAR; INTRAVENOUS at 08:00

## 2019-04-30 RX ADMIN — Medication 3 MG: at 07:47

## 2019-04-30 RX ADMIN — DEXAMETHASONE SODIUM PHOSPHATE 4 MG: 4 INJECTION, SOLUTION INTRAMUSCULAR; INTRAVENOUS at 06:22

## 2019-04-30 RX ADMIN — EPHEDRINE SULFATE 10 MG: 50 INJECTION INTRAMUSCULAR; INTRAVENOUS; SUBCUTANEOUS at 07:03

## 2019-04-30 RX ADMIN — SODIUM CHLORIDE, POTASSIUM CHLORIDE, SODIUM LACTATE AND CALCIUM CHLORIDE: 600; 310; 30; 20 INJECTION, SOLUTION INTRAVENOUS at 07:57

## 2019-04-30 RX ADMIN — GLYCOPYRROLATE 0.2 MG: 0.2 INJECTION INTRAMUSCULAR; INTRAVENOUS at 07:42

## 2019-04-30 RX ADMIN — FENTANYL CITRATE 25 MCG: 50 INJECTION INTRAMUSCULAR; INTRAVENOUS at 07:55

## 2019-04-30 RX ADMIN — DEXAMETHASONE SODIUM PHOSPHATE 8 MG: 4 INJECTION, SOLUTION INTRAMUSCULAR; INTRAVENOUS at 06:52

## 2019-04-30 RX ADMIN — EPHEDRINE SULFATE 5 MG: 50 INJECTION INTRAMUSCULAR; INTRAVENOUS; SUBCUTANEOUS at 07:32

## 2019-04-30 NOTE — ANESTHESIA POSTPROCEDURE EVALUATION
Patient: Flores Seaman    Procedure Summary     Date:  04/30/19 Room / Location:   [facility]  [facility]    Anesthesia Start:  0644 Anesthesia Stop:  0805    Procedure:  LEFT SHOULDER ARTHROSCOPY WITH ROTATOR CUFF REPAIR, SUBACROMIAL DECOMPRESSION AND BICEPS TENOTOMY (Left Shoulder) Diagnosis:      Surgeon:  Jarett Walker MD Provider:  Isra Durham MD    Anesthesia Type:  general with block ASA Status:  2          Anesthesia Type: general with block  Last vitals  BP   129/87 (04/30/19 0853)   Temp   36.4 °C (97.6 °F) (04/30/19 0843)   Pulse   60 (04/30/19 0853)   Resp   16 (04/30/19 0853)     SpO2   97 % (04/30/19 0853)     Post Anesthesia Care and Evaluation    Patient location during evaluation: bedside  Patient participation: complete - patient participated  Level of consciousness: awake  Pain score: 1  Pain management: adequate  Airway patency: patent  Anesthetic complications: No anesthetic complications    Cardiovascular status: acceptable  Respiratory status: acceptable  Hydration status: acceptable    Comments: /87 (BP Location: Right arm, Patient Position: Lying)   Pulse 60   Temp 36.4 °C (97.6 °F)   Resp 16   Wt 59.7 kg (131 lb 9.8 oz)   SpO2 97%   BMI 23.31 kg/m²

## 2019-04-30 NOTE — ANESTHESIA PROCEDURE NOTES
Peripheral Block    Pre-sedation assessment completed: 4/30/2019 6:22 AM    Patient reassessed immediately prior to procedure    Patient location during procedure: pre-op  Start time: 4/30/2019 6:22 AM  Stop time: 4/30/2019 6:34 AM  Reason for block: at surgeon's request and post-op pain management  Performed by  Anesthesiologist: Isra Durham MD  Preanesthetic Checklist  Completed: patient identified, site marked, surgical consent, pre-op evaluation, timeout performed, IV checked, risks and benefits discussed and monitors and equipment checked  Prep:  Pt Position: sitting  Sterile barriers:cap, gloves, mask and sterile barriers  Prep: ChloraPrep  Patient monitoring: blood pressure monitoring, continuous pulse oximetry and EKG  Procedure  Sedation:yes  Performed under: MAC  Guidance:ultrasound guided  ULTRASOUND INTERPRETATION. Using ultrasound guidance a 20 G gauge needle was placed in close proximity to the nerve, at which point, under ultrasound guidance anesthetic was injected in the area of the nerve and spread of the anesthesia was seen on ultrasound in close proximity thereto.  There were no abnormalities seen on ultrasound; a digital image was taken; and the patient tolerated the procedure with no complications. Images:still images obtained    Laterality:left  Block Type:interscalene  Injection Technique:single-shot  Needle Type:echogenic  Needle Gauge:20 G    Medications Used: dexamethasone (DECADRON) injection, 4 mg  bupivacaine (PF) (MARCAINE) 0.5 % injection, 15 mL  lidocaine PF 2% (XYLOCAINE) injection, 5 mL  Post Assessment  Injection Assessment: negative aspiration for heme, no paresthesia on injection and incremental injection  Patient Tolerance:comfortable throughout block  Complications:no

## 2019-04-30 NOTE — ANESTHESIA PROCEDURE NOTES
Airway  Urgency: elective    Airway not difficult    General Information and Staff    Patient location during procedure: OR  Anesthesiologist: Isra Durham MD  CRNA: Eugenia Cortés CRNA    Indications and Patient Condition  Indications for airway management: airway protection    Preoxygenated: yes  Mask difficulty assessment: 1 - vent by mask    Final Airway Details  Final airway type: endotracheal airway      Successful airway: ETT  Cuffed: yes   Successful intubation technique: direct laryngoscopy  Facilitating devices/methods: Bougie  Endotracheal tube insertion site: oral  Blade: Carlos  Blade size: 3  ETT size (mm): 7.0  Cormack-Lehane Classification: grade I - full view of glottis  Placement verified by: chest auscultation and capnometry   Cuff volume (mL): 5  Measured from: teeth  ETT to teeth (cm): 21  Number of attempts at approach: 1    Additional Comments  Atraumatic. Dentition as preop.

## 2019-04-30 NOTE — ANESTHESIA PREPROCEDURE EVALUATION
Anesthesia Evaluation     Patient summary reviewed and Nursing notes reviewed   NPO Solid Status: > 8 hours  NPO Liquid Status: > 2 hours           Airway   Mallampati: II  no difficulty expected  Dental - normal exam     Pulmonary     breath sounds clear to auscultation  (+) asthma,   Cardiovascular     ECG reviewed  Rhythm: regular  Rate: normal        Neuro/Psych  GI/Hepatic/Renal/Endo      Musculoskeletal     Abdominal    Substance History      OB/GYN          Other   (+) arthritis                     Anesthesia Plan    ASA 2     general with block     intravenous induction   Anesthetic plan, all risks, benefits, and alternatives have been provided, discussed and informed consent has been obtained with: patient.

## 2019-05-22 ENCOUNTER — HOSPITAL ENCOUNTER (OUTPATIENT)
Dept: PHYSICAL THERAPY | Facility: HOSPITAL | Age: 64
Setting detail: THERAPIES SERIES
Discharge: HOME | End: 2019-05-22

## 2019-05-22 DIAGNOSIS — Z98.890 STATUS POST LEFT ROTATOR CUFF REPAIR: Primary | ICD-10-CM

## 2019-05-22 PROCEDURE — 97140 MANUAL THERAPY 1/> REGIONS: CPT | Performed by: PHYSICAL THERAPIST

## 2019-05-22 PROCEDURE — G0283 ELEC STIM OTHER THAN WOUND: HCPCS | Performed by: PHYSICAL THERAPIST

## 2019-05-22 PROCEDURE — 97161 PT EVAL LOW COMPLEX 20 MIN: CPT | Performed by: PHYSICAL THERAPIST

## 2019-05-22 NOTE — THERAPY EVALUATION
Outpatient Physical Therapy Ortho Initial Evaluation   [place]     Patient Name: Flores Seaman  : 1955  MRN: [id number]  Today's Date: 2019      Visit Date: 2019    Patient Active Problem List   Diagnosis   • Injury of glenoid labrum   • Complete tear of left rotator cuff   • Arthritis of left acromioclavicular joint   • Biceps tendinitis of left upper extremity   • Medial epicondylitis of elbow, right        Past Medical History:   Diagnosis Date   • Asthma    • History of anemia    • History of methicillin resistant staphylococcus aureus (MRSA)     PATRICIA BLOOM MD TREATED   • Rotator cuff injury     LEFT        Past Surgical History:   Procedure Laterality Date   • APPENDECTOMY     • COLONOSCOPY     • CYST REMOVAL      FOREHEAD   • SHOULDER ARTHROSCOPY W/ ROTATOR CUFF REPAIR Left 2019    Procedure: LEFT SHOULDER ARTHROSCOPY WITH ROTATOR CUFF REPAIR, SUBACROMIAL DECOMPRESSION AND BICEPS TENOTOMY;  Surgeon: Jarett Walker MD;  Location: [facility] OR [facility] [place];  Service: Orthopedics   • TONSILLECTOMY         Visit Dx:     ICD-10-CM ICD-9-CM   1. Status post left rotator cuff repair Z98.890 V45.89         Patient History     Row Name 19 1400             History    Chief Complaint  Pain;Difficulty with daily activities  -SP      Type of Pain  Shoulder pain left  -SP      Date Current Problem(s) Began  19  -SP      Brief Description of Current Complaint  Patient with chronic history of left shoulder pain that did not improved with conservative measures including therapy.  Patient underwent RTC repair 2019.  Patient states that she has done well since surgery and is wearing sling consistently.  Has pain with movement in front aspect of shoulder and in back.  Pain with dressing.  She is having pain in cervical spine area.   -SP      Previous treatment for THIS PROBLEM  Surgery;Medication  -SP      Surgery Date:  19  -SP      Patient/Caregiver Goals   Relieve pain;Improve mobility;Return to prior level of function  -SP      Patient's Rating of General Health  Good  -SP      Hand Dominance  left-handed  -SP      Occupation/sports/leisure activities  respiratory therapisst  -SP      What clinical tests have you had for this problem?  MRI  -SP      Results of Clinical Tests  supraspinatus tear  -SP         Pain     Pain Location  Shoulder  -SP      Pain at Present  3  -SP      Pain at Best  3  -SP      Pain at Worst  8  -SP      Pain Frequency  Constant/continuous  -SP      Pain Description  Aching;Sharp  -SP      What Performance Factors Make the Current Problem(s) WORSE?  any movement, but also has pain at rest and pain increases as day goes on  -SP      What Performance Factors Make the Current Problem(s) BETTER?  pain meds  -SP      Tolerance Time- Standing  limited tolerance for activity  -SP      Tolerance Time- Walking  limited tolerance for activity  -SP      Is your sleep disturbed?  Yes  -SP      Is medication used to assist with sleep?  Yes  -SP      What position do you sleep in?  -- in recliner  -SP      Difficulties at work?  currently  -SP      Difficulties with ADL's?  difficulty dressing, self care and ADLs  -SP         Fall Risk Assessment    Any falls in the past year:  No  -SP         Daily Activities    Primary Language  English  -SP      Are you able to read  Yes  -SP      Are you able to write  Yes  -SP      How does patient learn best?  Reading  -SP      Teaching needs identified  Home Exercise Program;Management of Condition  -SP      Patient is concerned about/has problems with  Repetitive movements of the hand, arm, shoulder  -SP      Does patient have problems with the following?  None  -SP      Barriers to learning  None  -SP      Pt Participated in POC and Goals  Yes  -SP         Safety    Are you being hurt, hit, or frightened by anyone at home or in your life?  No  -SP      Are you being neglected by a caregiver  No  -SP        User  Key  (r) = Recorded By, (t) = Taken By, (c) = Cosigned By    Initials Name Provider Type    SP Nicolette Davis, PT Physical Therapist          PT Ortho     Row Name 05/22/19 1415       Precautions and Contraindications    Precautions  orders for PROM to AAROM and pain modalities   -SP       Posture/Observations    Forward Head  Mild  -SP    Cervical Lordosis  Decreased  -SP    Thoracic Kyphosis  Normal  -SP    Rounded Shoulders  Bilateral:;Mild  -SP    Scapular Elevation  Bilateral:;Mild  -SP    Scapular winging  Bilateral:;Normal  -SP    Observations  Edema;Incision healing;Muscle atrophy  -SP    Posture/Observations Comments  Patient with sling in place with abduction pillow.  Patient with generalized shoulder muscle atrophy.  Palpable mass area medial mid upper arm, likely edematous  -SP       Shoulder Girdle Accessory Motions    Posterior glide of humerus  Left:;Hypomobile  -SP       General ROM    GENERAL ROM COMMENTS  left wrist, hand, and elbow  AROM wfl  -SP       Left Upper Ext    Lt Shoulder Abduction PROM  100 degrees with mild pain at end range  -SP    Lt Shoulder Flexion PROM  93 degrees with pain at end range  -SP    Lt Shoulder External Rotation PROM  4 degrees with pain  -SP    Lt Elbow Extension/Flexion AROM  wfl  -SP       MMT Right Upper Ext    Rt Upper Extremity Comments   strength not assessed per protocol.  -SP       Sensation    Light Touch  -- mildly decreased to light touch lateral (L) shoulder  -SP       Upper Extremity Flexibility    Upper Trapezius  Bilateral:  -SP    Levator Scapula  Moderately limited  -SP    Pect Minor  Bilateral:;Moderately limited  -SP       Transfers    Sit-Stand [place] (Transfers)  independent  -SP    Stand-Sit [place] (Transfers)  independent  -SP    Comment (Transfers)  independent transfers sup/sit; needs cues to avoid pushing into left UE to assist with transfer to sit  -SP      User Key  (r) = Recorded By, (t) = Taken By, (c) = Cosigned By     Initials Name Provider Type    Nicolette Hernandez, PT Physical Therapist                      Therapy Education  Given: HEP  Program: New  How Provided: Written, Verbal  Provided to: Patient  Level of Understanding: Verbalized, Demonstrated     PT OP Goals     Row Name 05/22/19 1415          PT Short Term Goals    STG Date to Achieve  06/06/19  -SP     STG 1  Patient demonstrates PROM left shoulder flexion and abduction to >130 degrees  -SP     STG 2  Patient demonstrates PROM left shoulder ER to 45 degrees  -SP     STG 3  Patient reports improved ability to dress self and perform self care with pain level <2/10 at worst  -SP     STG 4  Patient able to tolerate light waist level ADLs without increased pain   -SP        Long Term Goals    LTG Date to Achieve  07/06/19  -SP     LTG 1  Patient demonstrates AROM/ AAROM left shoulder to >130 degrees   -SP     LTG 2  Patient demonstrates left shoulder strength to at least 4/5 throughout  -SP     LTG 3  Patient reports she is able to complete light household ADLs without increased pain or compensation.  -SP     LTG 4  Patient independent with HEP  -SP        Time Calculation    PT Goal Re-Cert Due Date  06/21/19  -SP       User Key  (r) = Recorded By, (t) = Taken By, (c) = Cosigned By    Initials Name Provider Type    Nicolette Hernandez, PT Physical Therapist          PT Assessment/Plan     Row Name 05/22/19 1650          PT Assessment    Functional Limitations  Limitation in home management;Limitations in community activities;Performance in work activities;Performance in self-care ADL;Performance in leisure activities  -SP     Impairments  Impaired flexibility;Pain;Muscle strength;Posture;Range of motion  -SP     Assessment Comments  Patient with chronic history of left shoulder pain, now s/p left RTC repair 4-.  Patient presents with pain, decreased ROM, weakness, edema and impaired functional ability to complete self care, home and community  ADLs  -SP     Please refer to paper survey for additional self-reported information  Yes  -SP     Rehab Potential  Good  -SP     Patient/caregiver participated in establishment of treatment plan and goals  Yes  -SP     Patient would benefit from skilled therapy intervention  Yes  -SP        PT Plan    PT Frequency  2x/week  -SP     Predicted Duration of Therapy Intervention (Therapy Eval)  6-8 weeks  -SP     Planned CPT's?  PT EVAL LOW COMPLEXITY: 12628;PT MANUAL THERAPY EA 15 MIN: 05134;PT ELECTRICAL STIM UNATTEND: ;PT THER PROC EA 15 MIN: 91256  -SP       User Key  (r) = Recorded By, (t) = Taken By, (c) = Cosigned By    Initials Name Provider Type    Nicolette Hernandez, PT Physical Therapist          Modalities     Row Name 05/22/19 1415             Ice    Ice Applied  Yes with IFC  -SP      Location  left shoulder/medial upper arm  -SP      Rx Minutes  15 mins  -SP      Ice S/P Rx  Yes  -SP         ELECTRICAL STIMULATION    Attended/Unattended  Unattended  -SP      Stimulation Type  IFC  -SP      Location/Electrode Placement/Other  left shoulder and medial upper arm   -SP        User Key  (r) = Recorded By, (t) = Taken By, (c) = Cosigned By    Initials Name Provider Type    Nicolette Hernandez, PT Physical Therapist        Exercises     Row Name 05/22/19 1415             Exercise 1    Exercise Name 1  UE AROM: hand pumps, elbow flex/ext, forearm pron/sup, wrist flex/ext  -SP      Reps 1  10x eac  -SP         Exercise 2    Exercise Name 2  codmans/pendulum  -SP        User Key  (r) = Recorded By, (t) = Taken By, (c) = Cosigned By    Initials Name Provider Type    Nicolette Hernandez, PT Physical Therapist           Manual Rx (last 36 hours)      Manual Treatments     Row Name 05/22/19 1415             Manual Rx 1    Manual Rx 1 Location  left shoulder  -SP      Manual Rx 1 Type  PROM into all planes 10-15 reps each  -SP      Manual Rx 1 Duration  15 min  -SP        User Key  (r) =  Recorded By, (t) = Taken By, (c) = Cosigned By    Initials Name Provider Type    SP Nicolette Davis, PT Physical Therapist                      Outcome Measure Options: Quick DASH  Quick DASH  Open a tight or new jar.: Moderate Difficulty  Do heavy household chores (e.g., wash walls, wash floors): Unable  Carry a shopping bag or briefcase: Mild Difficulty  Wash your back: Unable  Use a knife to cut food: Moderate Difficulty  Recreational activities in which you take some force or impact through your arm, should or hand (e.g. golf, hammering, tennis, etc.): Unable  During the past week, to what extent has your arm, shoulder, or hand problem interfered with your normal social activites with family, friends, neighbors or groups?: Moderately  During the past week, were you limited in your work or other regular daily activities as a result of your arm, shoulder or hand problem?: Very limited  Arm, Shoulder, or hand pain: Moderate  Tingling (pins and needles) in your arm, shoulder, or hand: Moderate  During the past week, how much difficulty have you had sleeping because of the pain in your arm, shoulder or hand?: Moderate Difficiculty  Number of Questions Answered: 11  Quick DASH Score: 63.64  Work Module (Optional)  Using your usual technique for your work?: Unable  Doing your usual work because of arm, shoulder or hand pain?: Unable  Doing your work as well as you would like?: Unable  Spending your usual amount of time doing your work?: Unable  Work Module Score: 100         Time Calculation:     Start Time: 1415  Stop Time: 1520  Time Calculation (min): 65 min     Therapy Charges for Today     Code Description Service Date Service Provider Modifiers Qty    35907857079 HC PT ELECTRICAL STIM UNATTENDED 5/22/2019 Nicolette Davis, PT  1    05719722836 HC PT MANUAL THERAPY EA 15 MIN 5/22/2019 Nicolette Davis, PT GP 1    77241054068 HC PT EVAL LOW COMPLEXITY 2 5/22/2019 Nicolette Davis,  PT GP 1          PT G-Codes  Outcome Measure Options: Quick DASH  Quick DASH Score: 63.64         Nicolette Davis, PT  5/22/2019

## 2019-05-24 ENCOUNTER — HOSPITAL ENCOUNTER (OUTPATIENT)
Dept: PHYSICAL THERAPY | Facility: HOSPITAL | Age: 64
Setting detail: THERAPIES SERIES
Discharge: HOME | End: 2019-05-24

## 2019-05-24 DIAGNOSIS — Z98.890 STATUS POST LEFT ROTATOR CUFF REPAIR: Primary | ICD-10-CM

## 2019-05-24 PROCEDURE — 97140 MANUAL THERAPY 1/> REGIONS: CPT

## 2019-05-24 PROCEDURE — G0283 ELEC STIM OTHER THAN WOUND: HCPCS

## 2019-05-24 NOTE — THERAPY TREATMENT NOTE
"    Outpatient Physical Therapy Ortho Treatment Note   [facility]     Patient Name: Flores Seaman  : 1955  MRN: [id number]  Today's Date: 2019      Visit Date: 2019    Visit Dx:    ICD-10-CM ICD-9-CM   1. Status post left rotator cuff repair Z98.890 V45.89       Patient Active Problem List   Diagnosis   • Injury of glenoid labrum   • Complete tear of left rotator cuff   • Arthritis of left acromioclavicular joint   • Biceps tendinitis of left upper extremity   • Medial epicondylitis of elbow, right        Past Medical History:   Diagnosis Date   • Asthma    • History of anemia    • History of methicillin resistant staphylococcus aureus (MRSA)     PATRICIA KAUR - MARISEL BLOOM MD TREATED   • Rotator cuff injury     LEFT        Past Surgical History:   Procedure Laterality Date   • APPENDECTOMY     • COLONOSCOPY     • CYST REMOVAL      FOREHEAD   • SHOULDER ARTHROSCOPY W/ ROTATOR CUFF REPAIR Left 2019    Procedure: LEFT SHOULDER ARTHROSCOPY WITH ROTATOR CUFF REPAIR, SUBACROMIAL DECOMPRESSION AND BICEPS TENOTOMY;  Surgeon: Jarett Walker MD;  [place] [facility] [place] [facility] [place]  Service: Orthopedics   • TONSILLECTOMY         PT Ortho     Row Name 19 0800       Subjective Comments    Subjective Comments  Pt states she tolerated activities of eval well; now realizes that her neck pain is from pulling weeds; pt states she is trying not to use her (L) arm too much   -       Subjective Pain    Subjective Pain Comment  No specific pain rating given for the shoulder stating it \"gets sore when I get dressed\"  -    Row Name        Precautions and Contraindications    Precautions        Posture/Observations    Forward Head     Cervical Lordosis     Thoracic Kyphosis     Rounded Shoulders     Scapular Elevation     Scapular winging     Observations     Posture/Observations Comments        Shoulder Girdle Accessory Motions    Posterior glide of humerus        General ROM    GENERAL ROM COMMENTS     "    Left Upper Ext    Lt Shoulder Abduction PROM     Lt Shoulder Flexion PROM     Lt Shoulder External Rotation PROM     Lt Elbow Extension/Flexion AROM        MMT Right Upper Ext    Rt Upper Extremity Comments         Sensation    Light Touch        Upper Extremity Flexibility    Upper Trapezius     Levator Scapula     Pect Minor        Transfers    Sit-Stand [place] (Transfers)     Stand-Sit [place] (Transfers)     Comment (Transfers)       User Key  (r) = Recorded By, (t) = Taken By, (c) = Cosigned By    Initials Name Provider Type     Oh Chou, NAHOMI Physical Therapy Assistant    Nicolette Hernandez, PT Physical Therapist                      PT Assessment/Plan     Row Name 05/24/19 0900          PT Assessment    Assessment Comments  Pt continues with bruising and edema medial upper arm - per pt, area is not as hard since she has been massaging it; pt with good tolerance to PROM; pt with 4/10 pain rating after stretches but decreased with modalities  -        PT Plan    PT Plan Comments  Cont per POC, progressing per protocol; check response to kinesiotape  -       User Key  (r) = Recorded By, (t) = Taken By, (c) = Cosigned By    Initials Name Provider Type     Oh Chou, NAHOMI Physical Therapy Assistant          Modalities     Row Name 05/24/19 0800             Moist Heat    Location  (L) shoulder - pt seated w/ pillow for support  -      Rx Minutes  10 mins  -      MH Prior to Rx  Yes  -         Ice    Location  left shoulder/medial upper arm w/ IFC - pt seated with pillow for support  -      Rx Minutes  15 mins  -      Ice S/P Rx  Yes  -         ELECTRICAL STIMULATION    Attended/Unattended  Unattended  -      Stimulation Type  IFC  -MH      Location/Electrode Placement/Other  left shoulder/upper arm  - seated with   -       PT E-Stim Unattended (Manual) Minutes  15  -         Other Treatment Provided    Taping / Bracing  Trial of kinesiotape using  "fan strip for bruising/edema medial, upper arm.  Pt to leave tape up to 5 days and trim as needed.  -        User Key  (r) = Recorded By, (t) = Taken By, (c) = Cosigned By    Initials Name Provider Type     Oh Chou PTA Physical Therapy Assistant        Exercises     Row Name 05/24/19 0800             Subjective Comments    Subjective Comments  Pt states she tolerated activities of eval well; now realizes that her neck pain is from pulling weeds; pt states she is trying not to use her (L) arm too much   -         Subjective Pain    Subjective Pain Comment  No specific pain rating given for the shoulder stating it \"gets sore when I get dressed\"  -         Exercise 1    Exercise Name 1  Review of HEP with instruction for pendulum at pt request.  -        User Key  (r) = Recorded By, (t) = Taken By, (c) = Cosigned By    Initials Name Provider Type     Oh Chou PTA Physical Therapy Assistant                      Manual Rx (last 36 hours)      Manual Treatments     Row Name 05/24/19 0800             Manual Rx 1    Manual Rx 1 Location  left shoulder  -      Manual Rx 1 Type  PROM into all planes 10-15 reps each  -      Manual Rx 1 Duration  15 min  -        User Key  (r) = Recorded By, (t) = Taken By, (c) = Cosigned By    Initials Name Provider Type     Oh Chou PTA Physical Therapy Assistant              Therapy Education  Education Details: Explanation of kinesiotape with instruction for care of tape, wear schedule and safe removal; encouraged activity that follows her protocol  Given: Symptoms/condition management, Edema management, HEP  Program: Reinforced  How Provided: Verbal  Provided to: Patient  Level of Understanding: Teach back education performed, Verbalized, Demonstrated              Time Calculation:   Start Time: 0800  Stop Time: 0910  Time Calculation (min): 70 min  Therapy Charges for Today     Code Description Service Date Service Provider Modifiers Qty    " 39852945004 HC PT ELECTRICAL STIM UNATTENDED 5/24/2019 Oh Chou, PTA  1    83812630057 HC PT MANUAL THERAPY EA 15 MIN 5/24/2019 Oh Chou, PTA GP 1                    Oh Chou, NAHOMI  5/24/2019

## 2019-05-28 ENCOUNTER — HOSPITAL ENCOUNTER (OUTPATIENT)
Dept: PHYSICAL THERAPY | Facility: HOSPITAL | Age: 64
Setting detail: THERAPIES SERIES
Discharge: HOME | End: 2019-05-28

## 2019-05-28 DIAGNOSIS — Z98.890 STATUS POST LEFT ROTATOR CUFF REPAIR: Primary | ICD-10-CM

## 2019-05-28 PROCEDURE — 97140 MANUAL THERAPY 1/> REGIONS: CPT | Performed by: PHYSICAL THERAPIST

## 2019-05-28 PROCEDURE — G0283 ELEC STIM OTHER THAN WOUND: HCPCS | Performed by: PHYSICAL THERAPIST

## 2019-05-28 NOTE — THERAPY TREATMENT NOTE
Outpatient Physical Therapy Ortho Treatment Note   Mary Domínguez     Patient Name: Flores Seaman  : 1955  MRN: [id number]  Today's Date: 2019      Visit Date: 2019    Visit Dx:    ICD-10-CM ICD-9-CM   1. Status post left rotator cuff repair Z98.890 V45.89       Patient Active Problem List   Diagnosis   • Injury of glenoid labrum   • Complete tear of left rotator cuff   • Arthritis of left acromioclavicular joint   • Biceps tendinitis of left upper extremity   • Medial epicondylitis of elbow, right        Past Medical History:   Diagnosis Date   • Asthma    • History of anemia    • History of methicillin resistant staphylococcus aureus (MRSA)     IFROM A NATASHA - MARISEL BLOOM MD TREATED   • Rotator cuff injury     LEFT        Past Surgical History:   Procedure Laterality Date   • APPENDECTOMY     • COLONOSCOPY     • CYST REMOVAL      FOREHEAD   • SHOULDER ARTHROSCOPY W/ ROTATOR CUFF REPAIR Left 2019    Procedure: LEFT SHOULDER ARTHROSCOPY WITH ROTATOR CUFF REPAIR, SUBACROMIAL DECOMPRESSION AND BICEPS TENOTOMY;  Surgeon: Jarett Walker MD;  Location: [facility] OR [facility] [place];  Service: Orthopedics   • TONSILLECTOMY                         PT Assessment/Plan     Row Name 19 0710          PT Assessment    Assessment Comments  Pt is doing well with good tolerance to AAROM exercises with pulley.  -GC        PT Plan    PT Plan Comments  Pt is to continue her HEP daily with therapy 2-3x weekly.  -GC       User Key  (r) = Recorded By, (t) = Taken By, (c) = Cosigned By    Initials Name Provider Type    GC Lima Henson, PT Physical Therapist          Modalities     Row Name 19 0710             Moist Heat    MH Applied  Yes  -GC      Location  (L) shoulder - pt seated w/ pillow for support  -GC      Rx Minutes  10 mins  -GC      MH Prior to Rx  Yes  -GC         Ice    Ice Applied  Yes  -GC      Location  left shoulder/medial upper arm w/ IFC - pt seated with pillow for support  -GC      Rx  Minutes  15 mins  -GC      Ice S/P Rx  Yes  -         ELECTRICAL STIMULATION    Attended/Unattended  Unattended  -      Stimulation Type  IFC  -      Location/Electrode Placement/Other  left shoulder/upper arm  - seated with CP  -       PT E-Stim Unattended (Manual) Minutes  15  -GC         Other Treatment Provided    Taping / Bracing  Trial of kinesiotape using fan strip for bruising/edema medial, upper arm.  Pt to leave tape up to 5 days and trim as needed.  -GC        User Key  (r) = Recorded By, (t) = Taken By, (c) = Cosigned By    Initials Name Provider Type     Liam Henson, PT Physical Therapist        Exercises     Row Name 05/28/19 0710             Subjective Comments    Subjective Comments  Pt still c/o upper arm pain in the morning and states she can still get it in certian positions that are painful.  -GC         Exercise 1    Exercise Name 1  Pulley-FLEX  -GC      Cueing 1  Verbal;Demo  -GC      Time 1  3 min  -GC         Exercise 2    Exercise Name 2  Pulley-Scaption  -GC      Cueing 2  Verbal;Demo  -GC      Time 2  3 min  -GC        User Key  (r) = Recorded By, (t) = Taken By, (c) = Cosigned By    Initials Name Provider Type     Liam Henson, PT Physical Therapist                      Manual Rx (last 36 hours)      Manual Treatments     Row Name 05/28/19 0710             Manual Rx 1    Manual Rx 1 Location  left shoulder  -      Manual Rx 1 Type  PROM into all planes 10-15 reps each  -      Manual Rx 1 Duration  15 min  -        User Key  (r) = Recorded By, (t) = Taken By, (c) = Cosigned By    Initials Name Provider Type     Liam Henson, PT Physical Therapist                             Time Calculation:   Start Time: 0710  Stop Time: 0813  Time Calculation (min): 63 min  Therapy Charges for Today     Code Description Service Date Service Provider Modifiers Qty    29807296409  PT ELECTRICAL STIM UNATTENDED 5/28/2019 Liam Henson, PT  1    [id number]  PT MANUAL  THERAPY EA 15 MIN 5/28/2019 Liam Henson, PT GP 1                    Liam Henson, PT  5/28/2019

## 2019-05-30 ENCOUNTER — HOSPITAL ENCOUNTER (OUTPATIENT)
Dept: PHYSICAL THERAPY | Facility: HOSPITAL | Age: 64
Setting detail: THERAPIES SERIES
Discharge: HOME | End: 2019-05-30

## 2019-05-30 DIAGNOSIS — Z98.890 STATUS POST LEFT ROTATOR CUFF REPAIR: Primary | ICD-10-CM

## 2019-05-30 PROCEDURE — G0283 ELEC STIM OTHER THAN WOUND: HCPCS | Performed by: PHYSICAL THERAPIST

## 2019-05-30 PROCEDURE — 97140 MANUAL THERAPY 1/> REGIONS: CPT | Performed by: PHYSICAL THERAPIST

## 2019-05-30 NOTE — THERAPY TREATMENT NOTE
Outpatient Physical Therapy Ortho Treatment Note   [place]     Patient Name: Flores Seaman  : 1955  MRN: [id number]  Today's Date: 2019      Visit Date: 2019    Visit Dx:    ICD-10-CM ICD-9-CM   1. Status post left rotator cuff repair Z98.890 V45.89       Patient Active Problem List   Diagnosis   • Injury of glenoid labrum   • Complete tear of left rotator cuff   • Arthritis of left acromioclavicular joint   • Biceps tendinitis of left upper extremity   • Medial epicondylitis of elbow, right        Past Medical History:   Diagnosis Date   • Asthma    • History of anemia    • History of methicillin resistant staphylococcus aureus (MRSA)     PATRICIA KAUR - MARISEL BLOOM MD TREATED   • Rotator cuff injury     LEFT        Past Surgical History:   Procedure Laterality Date   • APPENDECTOMY     • COLONOSCOPY     • CYST REMOVAL      FOREHEAD   • SHOULDER ARTHROSCOPY W/ ROTATOR CUFF REPAIR Left 2019    Procedure: LEFT SHOULDER ARTHROSCOPY WITH ROTATOR CUFF REPAIR, SUBACROMIAL DECOMPRESSION AND BICEPS TENOTOMY;  Surgeon: Jarett Walker MD;  [place] [facility] [place] [facility] [place]  Service: Orthopedics   • TONSILLECTOMY         PT Ortho     Row Name 19       Subjective Comments    Subjective Comments  Pt states her shoulder flet looser after her last visit.   -      User Key  (r) = Recorded By, (t) = Taken By, (c) = Cosigned By    Initials Name Provider Type     Liam eHnson, PT Physical Therapist                      PT Assessment/Plan     Row Name 19 52          PT Assessment    Assessment Comments  Pt continues to do well with increasing shoulder ROM noted with her stretches.  -        PT Plan    PT Plan Comments  Pt is to continue her HEP daily.  -       User Key  (r) = Recorded By, (t) = Taken By, (c) = Cosigned By    Initials Name Provider Type     Liam Henson, PT Physical Therapist          Modalities     Row Name 1962             Moist Heat    MH  Applied  Yes  -GC      Location  (L) shoulder - pt seated w/ pillow for support  -GC      Rx Minutes  10 mins  -GC      MH Prior to Rx  Yes  -GC         Ice    Ice Applied  Yes  -GC      Location  left shoulder/medial upper arm w/ IFC - pt seated with pillow for support  -GC      Rx Minutes  15 mins  -GC      Ice S/P Rx  Yes  -GC         ELECTRICAL STIMULATION    Attended/Unattended  Unattended  -GC      Stimulation Type  IFC  -GC      Location/Electrode Placement/Other  left shoulder/upper arm  - seated with CP  -GC       PT E-Stim Unattended (Manual) Minutes  15  -GC         Other Treatment Provided    Taping / Bracing  Trial of kinesiotape using fan strip for bruising/edema medial, upper arm.  Pt to leave tape up to 5 days and trim as needed.  -GC        User Key  (r) = Recorded By, (t) = Taken By, (c) = Cosigned By    Initials Name Provider Type     Liam Henson PT Physical Therapist        Exercises     Row Name 05/30/19 0930             Subjective Comments    Subjective Comments  Pt states her shoulder flet looser after her last visit.   -GC         Exercise 1    Exercise Name 1  Pulley-FLEX  -GC      Cueing 1  Verbal;Demo  -GC      Time 1  5 min  -GC         Exercise 2    Exercise Name 2  Pulley-Scaption  -GC      Cueing 2  Verbal;Demo  -GC      Time 2  5 min  -GC        User Key  (r) = Recorded By, (t) = Taken By, (c) = Cosigned By    Initials Name Provider Type     Liam Henson, PT Physical Therapist                                          Time Calculation:   Start Time: 0930  Stop Time: 1027  Time Calculation (min): 57 min  Therapy Charges for Today     Code Description Service Date Service Provider Modifiers Qty    26330709846  PT MANUAL THERAPY EA 15 MIN 5/30/2019 Liam Henson, PT GP 1    50533647362 HC PT ELECTRICAL STIM UNATTENDED 5/30/2019 Liam Henson, PT  1                    Liam Henson PT  5/30/2019

## 2019-06-04 ENCOUNTER — HOSPITAL ENCOUNTER (OUTPATIENT)
Dept: PHYSICAL THERAPY | Facility: HOSPITAL | Age: 64
Setting detail: THERAPIES SERIES
Discharge: HOME | End: 2019-06-04

## 2019-06-04 DIAGNOSIS — Z98.890 STATUS POST LEFT ROTATOR CUFF REPAIR: Primary | ICD-10-CM

## 2019-06-04 PROCEDURE — 97140 MANUAL THERAPY 1/> REGIONS: CPT | Performed by: PHYSICAL THERAPIST

## 2019-06-04 PROCEDURE — 97110 THERAPEUTIC EXERCISES: CPT | Performed by: PHYSICAL THERAPIST

## 2019-06-04 PROCEDURE — G0283 ELEC STIM OTHER THAN WOUND: HCPCS | Performed by: PHYSICAL THERAPIST

## 2019-06-04 NOTE — THERAPY TREATMENT NOTE
Outpatient Physical Therapy Ortho Treatment Note   Mary Domínguez     Patient Name: Flores Seaman  : 1955  MRN: [id number]  Today's Date: 2019      Visit Date: 2019    Visit Dx:    ICD-10-CM ICD-9-CM   1. Status post left rotator cuff repair Z98.890 V45.89       Patient Active Problem List   Diagnosis   • Injury of glenoid labrum   • Complete tear of left rotator cuff   • Arthritis of left acromioclavicular joint   • Biceps tendinitis of left upper extremity   • Medial epicondylitis of elbow, right        Past Medical History:   Diagnosis Date   • Asthma    • History of anemia    • History of methicillin resistant staphylococcus aureus (MRSA)     PATRICIA KAUR - MARISEL BLOOM MD TREATED   • Rotator cuff injury     LEFT        Past Surgical History:   Procedure Laterality Date   • APPENDECTOMY     • COLONOSCOPY     • CYST REMOVAL      FOREHEAD   • SHOULDER ARTHROSCOPY W/ ROTATOR CUFF REPAIR Left 2019    Procedure: LEFT SHOULDER ARTHROSCOPY WITH ROTATOR CUFF REPAIR, SUBACROMIAL DECOMPRESSION AND BICEPS TENOTOMY;  Surgeon: Jarett Walker MD;  [place] [facility] [place] [facility] [place]  Service: Orthopedics   • TONSILLECTOMY         PT Ortho     Row Name 19 9949       Subjective Comments    Subjective Comments  Pt states she instictively reached for a string while helping her  in the garden and she has had pain in her shoulder since. She spoke to the MD yesterday and she says he flet like her shoulder was still okay.  -      User Key  (r) = Recorded By, (t) = Taken By, (c) = Cosigned By    Initials Name Provider Type    Liam Godinez, PT Physical Therapist                      PT Assessment/Plan     Row Name 19 4008          PT Assessment    Assessment Comments  Pt was noted to have more pain with her stretches today, but her ROM continues to improve.  -        PT Plan    PT Plan Comments  Pt is to continue her HEP 2-3x daily.  -       User Key  (r) = Recorded By, (t) = Taken  By, (c) = Cosigned By    Initials Name Provider Type    GC Liam Henson, PT Physical Therapist          Modalities     Row Name 06/04/19 0845             Moist Heat    MH Applied  Yes  -GC      Location  (L) shoulder - pt seated w/ pillow for support  -GC      Rx Minutes  10 mins  -GC      MH Prior to Rx  Yes  -GC         Ice    Ice Applied  Yes  -GC      Location  left shoulder/medial upper arm w/ IFC - pt seated with pillow for support  -GC      Rx Minutes  15 mins  -GC      Ice S/P Rx  Yes  -GC         ELECTRICAL STIMULATION    Attended/Unattended  Unattended  -GC      Stimulation Type  IFC  -GC      Location/Electrode Placement/Other  left shoulder/upper arm  - seated with CP  -GC       PT E-Stim Unattended (Manual) Minutes  15  -GC         Other Treatment Provided    Taping / Bracing  Trial of kinesiotape using fan strip for bruising/edema medial, upper arm.  Pt to leave tape up to 5 days and trim as needed.  -GC        User Key  (r) = Recorded By, (t) = Taken By, (c) = Cosigned By    Initials Name Provider Type    Liam Godinez, PT Physical Therapist        Exercises     Row Name 06/04/19 0845             Subjective Comments    Subjective Comments  Pt states she instictively reached for a string while helping her  in the garden and she has had pain in her shoulder since. She spoke to the MD yesterday and she says he flet like her shoulder was still okay.  -GC         Exercise 1    Exercise Name 1  Pulley-FLEX  -GC      Cueing 1  Verbal;Demo  -GC      Time 1  5 min  -GC         Exercise 2    Exercise Name 2  Pulley-Scaption  -GC      Cueing 2  Verbal;Demo  -GC      Time 2  5 min  -GC         Exercise 3    Exercise Name 3  Cane stretch-FLEX  -GC      Cueing 3  Verbal;Demo  -GC      Reps 3  15  -GC         Exercise 4    Exercise Name 4  Cane stretch-ABD  -GC      Cueing 4  Verbal;Demo  -GC      Reps 4  15  -GC         Exercise 5    Exercise Name 5  Cane stretch-ER  -GC      Cueing 5  Verbal;Demo   -GC      Reps 5  15  -GC        User Key  (r) = Recorded By, (t) = Taken By, (c) = Cosigned By    Initials Name Provider Type    GC Liam Henson, PT Physical Therapist                      Manual Rx (last 36 hours)      Manual Treatments     Row Name 06/04/19 0845             Manual Rx 1    Manual Rx 1 Location  left shoulder  -      Manual Rx 1 Type  PROM into all planes 10-15 reps each  -GC      Manual Rx 1 Duration  15 min  -        User Key  (r) = Recorded By, (t) = Taken By, (c) = Cosigned By    Initials Name Provider Type    GC Liam Henson PT Physical Therapist                             Time Calculation:   Start Time: 0845  Stop Time: 0947  Time Calculation (min): 62 min  Therapy Charges for Today     Code Description Service Date Service Provider Modifiers Qty    56033028361  PT MANUAL THERAPY EA 15 MIN 6/4/2019 Liam Henson, PT GP 1    28737014568 HC PT THER PROC EA 15 MIN 6/4/2019 Liam Henson, PT GP 1    68598818280 HC PT ELECTRICAL STIM UNATTENDED 6/4/2019 Liam Henson, PT  1                    Liam Henson PT  6/4/2019

## 2019-06-07 ENCOUNTER — HOSPITAL ENCOUNTER (OUTPATIENT)
Dept: PHYSICAL THERAPY | Facility: HOSPITAL | Age: 64
Setting detail: THERAPIES SERIES
Discharge: HOME | End: 2019-06-07

## 2019-06-07 DIAGNOSIS — Z98.890 STATUS POST LEFT ROTATOR CUFF REPAIR: Primary | ICD-10-CM

## 2019-06-07 PROCEDURE — 97110 THERAPEUTIC EXERCISES: CPT | Performed by: PHYSICAL THERAPIST

## 2019-06-07 PROCEDURE — 97140 MANUAL THERAPY 1/> REGIONS: CPT | Performed by: PHYSICAL THERAPIST

## 2019-06-07 NOTE — THERAPY TREATMENT NOTE
Outpatient Physical Therapy Ortho Treatment Note   [place]     Patient Name: Flores Seaman  : 1955  MRN: [id number]  Today's Date: 2019      Visit Date: 2019    Visit Dx:    ICD-10-CM ICD-9-CM   1. Status post left rotator cuff repair Z98.890 V45.89       Patient Active Problem List   Diagnosis   • Injury of glenoid labrum   • Complete tear of left rotator cuff   • Arthritis of left acromioclavicular joint   • Biceps tendinitis of left upper extremity   • Medial epicondylitis of elbow, right        Past Medical History:   Diagnosis Date   • Asthma    • History of anemia    • History of methicillin resistant staphylococcus aureus (MRSA)     PATRICIA KAUR - MARISEL BLOOM MD TREATED   • Rotator cuff injury     LEFT        Past Surgical History:   Procedure Laterality Date   • APPENDECTOMY     • COLONOSCOPY     • CYST REMOVAL      FOREHEAD   • SHOULDER ARTHROSCOPY W/ ROTATOR CUFF REPAIR Left 2019    Procedure: LEFT SHOULDER ARTHROSCOPY WITH ROTATOR CUFF REPAIR, SUBACROMIAL DECOMPRESSION AND BICEPS TENOTOMY;  Surgeon: Jarett Walker MD;  [place] [facility] [place] [facility] [place]  Service: Orthopedics   • TONSILLECTOMY         PT Ortho     Row Name 19 07       Subjective Comments    Subjective Comments  Pt states her shoulder is more sore this morning.  -      User Key  (r) = Recorded By, (t) = Taken By, (c) = Cosigned By    Initials Name Provider Type     Liam Henson, PT Physical Therapist                      PT Assessment/Plan     Row Name 19 07          PT Assessment    Assessment Comments  Feel soreness is expected due to the new exercises.  -        PT Plan    PT Plan Comments  Pt is to continue her HEP 2x daily.  -       User Key  (r) = Recorded By, (t) = Taken By, (c) = Cosigned By    Initials Name Provider Type     Liam Henson, PT Physical Therapist          Modalities     Row Name 19 07             Moist Heat    MH Applied  Yes  -      Location  (L)  shoulder - pt seated w/ pillow for support  -GC      Rx Minutes  10 mins  -GC      MH Prior to Rx  Yes  -GC         Ice    Ice Applied  Yes  -GC      Location  left shoulder/medial upper arm w/ IFC - pt seated with pillow for support  -GC      Rx Minutes  15 mins  -GC      Ice S/P Rx  Yes  -GC         ELECTRICAL STIMULATION    Attended/Unattended  Unattended  -GC      Stimulation Type  IFC  -GC      Location/Electrode Placement/Other  left shoulder/upper arm  - seated with CP  -GC       PT E-Stim Unattended (Manual) Minutes  15  -GC         Other Treatment Provided    Taping / Bracing  Trial of kinesiotape using fan strip for bruising/edema medial, upper arm.  Pt to leave tape up to 5 days and trim as needed.  -GC        User Key  (r) = Recorded By, (t) = Taken By, (c) = Cosigned By    Initials Name Provider Type    GC Liam Henson, PT Physical Therapist        Exercises     Row Name 06/07/19 0700             Subjective Comments    Subjective Comments  Pt states her shoulder is more sore this morning.  -GC         Exercise 1    Exercise Name 1  Pulley-FLEX  -GC      Cueing 1  Verbal;Demo  -GC      Time 1  5 min  -GC         Exercise 2    Exercise Name 2  Pulley-Scaption  -GC      Cueing 2  Verbal;Demo  -GC      Time 2  5 min  -GC         Exercise 3    Exercise Name 3  Cane stretch-FLEX  -GC      Cueing 3  Verbal;Demo  -GC      Reps 3  15  -GC         Exercise 4    Exercise Name 4  Cane stretch-ABD  -GC      Cueing 4  Verbal;Demo  -GC      Reps 4  15  -GC         Exercise 5    Exercise Name 5  Cane stretch-ER  -GC      Cueing 5  Verbal;Demo  -GC      Reps 5  15  -GC        User Key  (r) = Recorded By, (t) = Taken By, (c) = Cosigned By    Initials Name Provider Type    GC Liam Henson, PT Physical Therapist                      Manual Rx (last 36 hours)      Manual Treatments     Row Name 06/07/19 0700             Manual Rx 1    Manual Rx 1 Location  left shoulder  -GC      Manual Rx 1 Type  PROM into all  planes 10-15 reps each  -      Manual Rx 1 Duration  15 min  -GC        User Key  (r) = Recorded By, (t) = Taken By, (c) = Cosigned By    Initials Name Provider Type     Liam Henson, PT Physical Therapist                             Time Calculation:   Start Time: 0700  Stop Time: 0812  Time Calculation (min): 72 min  Therapy Charges for Today     Code Description Service Date Service Provider Modifiers Qty    52693893788  PT THER PROC EA 15 MIN 6/7/2019 Liam Henson, PT GP 1    [id number]  PT MANUAL THERAPY EA 15 MIN 6/7/2019 Liam Henson, PT GP 1                    Liam Henson PT  6/7/2019

## 2019-06-13 ENCOUNTER — HOSPITAL ENCOUNTER (OUTPATIENT)
Dept: PHYSICAL THERAPY | Facility: HOSPITAL | Age: 64
Setting detail: THERAPIES SERIES
Discharge: HOME | End: 2019-06-13

## 2019-06-13 DIAGNOSIS — Z98.890 STATUS POST LEFT ROTATOR CUFF REPAIR: Primary | ICD-10-CM

## 2019-06-13 PROCEDURE — G0283 ELEC STIM OTHER THAN WOUND: HCPCS | Performed by: PHYSICAL THERAPIST

## 2019-06-13 PROCEDURE — 97110 THERAPEUTIC EXERCISES: CPT | Performed by: PHYSICAL THERAPIST

## 2019-06-13 PROCEDURE — 97140 MANUAL THERAPY 1/> REGIONS: CPT | Performed by: PHYSICAL THERAPIST

## 2019-06-13 NOTE — THERAPY TREATMENT NOTE
Outpatient Physical Therapy Ortho Treatment Note   Mary Domínguez     Patient Name: Flores Seaman  : 1955  MRN: [id number]  Today's Date: 2019      Visit Date: 2019    Visit Dx:    ICD-10-CM ICD-9-CM   1. Status post left rotator cuff repair Z98.890 V45.89       Patient Active Problem List   Diagnosis   • Injury of glenoid labrum   • Complete tear of left rotator cuff   • Arthritis of left acromioclavicular joint   • Biceps tendinitis of left upper extremity   • Medial epicondylitis of elbow, right        Past Medical History:   Diagnosis Date   • Asthma    • History of anemia    • History of methicillin resistant staphylococcus aureus (MRSA)     PATRICIA KAUR - MARISEL BLOOM MD TREATED   • Rotator cuff injury     LEFT        Past Surgical History:   Procedure Laterality Date   • APPENDECTOMY     • COLONOSCOPY     • CYST REMOVAL      FOREHEAD   • SHOULDER ARTHROSCOPY W/ ROTATOR CUFF REPAIR Left 2019    Procedure: LEFT SHOULDER ARTHROSCOPY WITH ROTATOR CUFF REPAIR, SUBACROMIAL DECOMPRESSION AND BICEPS TENOTOMY;  Surgeon: Jarett Walker MD;  [place] [facility] [place] [facility] [place]  Service: Orthopedics   • TONSILLECTOMY         PT Ortho     Row Name 19 1010       Subjective Comments    Subjective Comments  Pt states her shoulder is a little less sore this morning.  -GC       Left Upper Ext    Lt Shoulder Abduction AROM  104 degrees in supine  -GC    Lt Shoulder Abduction PROM  134 degrees  -GC    Lt Shoulder Flexion AROM  122 degrees in supine  -GC    Lt Shoulder Flexion PROM  135 degrees  -GC    Lt Shoulder External Rotation AROM  36 degrees  -GC    Lt Shoulder External Rotation PROM  45 degrees  -GC    Lt Shoulder Internal Rotation AROM  51 degrees  -GC    Lt Shoulder Internal Rotation PROM  61 degrees  -GC      User Key  (r) = Recorded By, (t) = Taken By, (c) = Cosigned By    Initials Name Provider Type    Liam Godinez, PT Physical Therapist                      PT Assessment/Plan      Row Name 06/13/19 1010          PT Assessment    Assessment Comments  Pt is showing increased sholder ROM and tolerated exercise porgression well.  -GC        PT Plan    PT Plan Comments  Pt has MD follow up this afternoon. Will continue as advised.  -GC       User Key  (r) = Recorded By, (t) = Taken By, (c) = Cosigned By    Initials Name Provider Type    Liam Godinez, PT Physical Therapist          Modalities     Row Name 06/13/19 1010             Moist Heat    MH Applied  Yes  -GC      Location  (L) shoulder - pt seated w/ pillow for support  -GC      Rx Minutes  10 mins  -GC      MH Prior to Rx  Yes  -GC         Ice    Ice Applied  Yes  -GC      Location  left shoulder/medial upper arm w/ IFC - pt seated with pillow for support  -GC      Rx Minutes  15 mins  -GC      Ice S/P Rx  Yes  -GC         ELECTRICAL STIMULATION    Attended/Unattended  Unattended  -GC      Stimulation Type  IFC  -GC      Location/Electrode Placement/Other  left shoulder/upper arm  - seated with CP  -GC       PT E-Stim Unattended (Manual) Minutes  15  -GC         Other Treatment Provided    Taping / Bracing  Trial of kinesiotape using fan strip for bruising/edema medial, upper arm.  Pt to leave tape up to 5 days and trim as needed.  -GC        User Key  (r) = Recorded By, (t) = Taken By, (c) = Cosigned By    Initials Name Provider Type    Liam Godinez PT Physical Therapist        Exercises     Row Name 06/13/19 1010             Subjective Comments    Subjective Comments  Pt states her shoulder is a little less sore this morning.  -GC         Exercise 1    Exercise Name 1  Pulley-FLEX  -GC      Cueing 1  Verbal;Demo  -GC      Time 1  5 min  -GC         Exercise 2    Exercise Name 2  Pulley-Scaption  -GC      Cueing 2  Verbal;Demo  -GC      Time 2  5 min  -GC         Exercise 3    Exercise Name 3  Cane stretch-FLEX  -GC      Cueing 3  Verbal;Demo  -GC      Reps 3  15  -GC         Exercise 4    Exercise Name 4  Cane stretch-ABD   -GC      Cueing 4  Verbal;Demo  -GC      Reps 4  15  -GC         Exercise 5    Exercise Name 5  Cane stretch-ER  -GC      Cueing 5  Verbal;Demo  -GC      Reps 5  15  -GC         Exercise 6    Exercise Name 6  shoulder IR vs theraband  -GC      Cueing 6  Verbal;Demo  -GC      Reps 6  25  -GC      Time 6  yellow  -GC         Exercise 7    Exercise Name 7  shoulder ER vs therband  -GC      Cueing 7  Verbal;Demo  -GC      Reps 7  25  -GC      Time 7  yellow  -GC        User Key  (r) = Recorded By, (t) = Taken By, (c) = Cosigned By    Initials Name Provider Type     Liam Henson, PT Physical Therapist                      Manual Rx (last 36 hours)      Manual Treatments     Row Name 06/13/19 1010             Manual Rx 1    Manual Rx 1 Location  left shoulder  -GC      Manual Rx 1 Type  PROM into all planes 10-15 reps each  -GC      Manual Rx 1 Duration  15 min  -GC        User Key  (r) = Recorded By, (t) = Taken By, (c) = Cosigned By    Initials Name Provider Type     Liam Henson, PT Physical Therapist                             Time Calculation:   Start Time: 1010  Stop Time: 1122  Time Calculation (min): 72 min  Therapy Charges for Today     Code Description Service Date Service Provider Modifiers Qty    28154337931 HC PT ELECTRICAL STIM UNATTENDED 6/13/2019 Liam Henson, PT  1    45076498087 HC PT MANUAL THERAPY EA 15 MIN 6/13/2019 Liam Henson, PT GP 1    02884841474 HC PT THER PROC EA 15 MIN 6/13/2019 Liam Henson, PT GP 1                    Liam Henson PT  6/13/2019

## 2019-06-18 ENCOUNTER — HOSPITAL ENCOUNTER (OUTPATIENT)
Dept: PHYSICAL THERAPY | Facility: HOSPITAL | Age: 64
Setting detail: THERAPIES SERIES
Discharge: HOME | End: 2019-06-18

## 2019-06-18 DIAGNOSIS — Z98.890 STATUS POST LEFT ROTATOR CUFF REPAIR: Primary | ICD-10-CM

## 2019-06-18 PROCEDURE — 97140 MANUAL THERAPY 1/> REGIONS: CPT | Performed by: PHYSICAL THERAPIST

## 2019-06-18 PROCEDURE — 97110 THERAPEUTIC EXERCISES: CPT | Performed by: PHYSICAL THERAPIST

## 2019-06-18 NOTE — THERAPY TREATMENT NOTE
Outpatient Physical Therapy Ortho Treatment Note   [place]     Patient Name: Flores Seaman  : 1955  MRN: [id number]  Today's Date: 2019      Visit Date: 2019    Visit Dx:    ICD-10-CM ICD-9-CM   1. Status post left rotator cuff repair Z98.890 V45.89       Patient Active Problem List   Diagnosis   • Injury of glenoid labrum   • Complete tear of left rotator cuff   • Arthritis of left acromioclavicular joint   • Biceps tendinitis of left upper extremity   • Medial epicondylitis of elbow, right        Past Medical History:   Diagnosis Date   • Asthma    • History of anemia    • History of methicillin resistant staphylococcus aureus (MRSA)     PATRICIA BLOOM MD TREATED   • Rotator cuff injury     LEFT        Past Surgical History:   Procedure Laterality Date   • APPENDECTOMY     • COLONOSCOPY     • CYST REMOVAL      FOREHEAD   • SHOULDER ARTHROSCOPY W/ ROTATOR CUFF REPAIR Left 2019    Procedure: LEFT SHOULDER ARTHROSCOPY WITH ROTATOR CUFF REPAIR, SUBACROMIAL DECOMPRESSION AND BICEPS TENOTOMY;  Surgeon: Jarett Wlaker MD;  [place] [facility] [place] [facility] [place]  Service: Orthopedics   • TONSILLECTOMY         PT Ortho     Row Name 19 1000       Subjective Comments    Subjective Comments  Pt states her shoulder is still sore.  -      User Key  (r) = Recorded By, (t) = Taken By, (c) = Cosigned By    Initials Name Provider Type    Liam Godinez, PT Physical Therapist                      PT Assessment/Plan     Row Name 19 1000          PT Assessment    Assessment Comments  Pt is doing well with increasing shoulder ROM noted with her stretching adn showing good tolerance to her exercise progression.  -        PT Plan    PT Plan Comments  Pt is to continue her HEP daily.  -       User Key  (r) = Recorded By, (t) = Taken By, (c) = Cosigned By    Initials Name Provider Type    Liam Godinez, PT Physical Therapist          Modalities     Row Name 19 1000              Moist Heat    MH Applied  Yes  -GC      Location  (L) shoulder - pt seated w/ pillow for support  -GC      Rx Minutes  10 mins  -GC      MH Prior to Rx  Yes  -GC         Ice    Ice Applied  Yes  -GC      Location  left shoulder/medial upper arm w/ IFC - pt seated with pillow for support  -GC      Rx Minutes  15 mins  -GC      Ice S/P Rx  Yes  -GC         ELECTRICAL STIMULATION    Attended/Unattended  Unattended  -GC      Stimulation Type  IFC  -GC      Location/Electrode Placement/Other  left shoulder/upper arm  - seated with CP  -GC       PT E-Stim Unattended (Manual) Minutes  15  -GC         Other Treatment Provided    Taping / Bracing  Trial of kinesiotape using fan strip for bruising/edema medial, upper arm.  Pt to leave tape up to 5 days and trim as needed.  -GC        User Key  (r) = Recorded By, (t) = Taken By, (c) = Cosigned By    Initials Name Provider Type    Liam Godinez, PT Physical Therapist        Exercises     Row Name 06/18/19 1000             Subjective Comments    Subjective Comments  Pt states her shoulder is still sore.  -GC         Exercise 1    Exercise Name 1  Pulley-FLEX  -GC      Cueing 1  Verbal;Demo  -GC      Time 1  5 min  -GC         Exercise 2    Exercise Name 2  Pulley-Scaption  -GC      Cueing 2  Verbal;Demo  -GC      Time 2  5 min  -GC         Exercise 3    Exercise Name 3  Cane stretch-FLEX  -GC      Cueing 3  Verbal;Demo  -GC      Reps 3  15  -GC         Exercise 4    Exercise Name 4  Cane stretch-ABD  -GC      Cueing 4  Verbal;Demo  -GC      Reps 4  15  -GC         Exercise 5    Exercise Name 5  Cane stretch-ER  -GC      Cueing 5  Verbal;Demo  -GC      Reps 5  15  -GC         Exercise 6    Exercise Name 6  shoulder IR vs theraband  -GC      Cueing 6  Verbal;Demo  -GC      Reps 6  25  -GC      Time 6  red  -GC         Exercise 7    Exercise Name 7  shoulder ER vs therband  -GC      Cueing 7  Verbal;Demo  -GC      Reps 7  25  -GC      Time 7  red  -GC          Exercise 8    Exercise Name 8  Shoulder rows vs theraband  -GC      Cueing 8  Verbal;Demo  -GC      Reps 8  25  -GC      Time 8  red  -GC         Exercise 9    Exercise Name 9  Shoulder Extension vs theraband  -GC      Cueing 9  Verbal;Demo  -GC      Reps 9  25  -GC      Time 9  red  -GC         Exercise 10    Exercise Name 10  Scaption Up  -GC      Cueing 10  Verbal;Demo  -GC      Reps 10  25  -GC         Exercise 11    Exercise Name 11  Scaption down  -GC      Cueing 11  Verbal;Demo  -GC      Reps 11  25  -GC        User Key  (r) = Recorded By, (t) = Taken By, (c) = Cosigned By    Initials Name Provider Type     Liam Henson, PT Physical Therapist                      Manual Rx (last 36 hours)      Manual Treatments     Row Name 06/18/19 1000             Manual Rx 1    Manual Rx 1 Location  left shoulder  -GC      Manual Rx 1 Type  PROM into all planes 10-15 reps each  -GC      Manual Rx 1 Duration  15 min  -GC        User Key  (r) = Recorded By, (t) = Taken By, (c) = Cosigned By    Initials Name Provider Type     Liam Henson, PT Physical Therapist                             Time Calculation:   Start Time: 1000  Stop Time: 1103  Time Calculation (min): 63 min  Therapy Charges for Today     Code Description Service Date Service Provider Modifiers Qty    87578409892 HC PT MANUAL THERAPY EA 15 MIN 6/18/2019 Liam Henson, PT GP 1    47172066792 HC PT THER PROC EA 15 MIN 6/18/2019 Liam Henson, PT GP 1                    Liam Henson PT  6/18/2019

## 2019-06-20 ENCOUNTER — HOSPITAL ENCOUNTER (OUTPATIENT)
Dept: PHYSICAL THERAPY | Facility: HOSPITAL | Age: 64
Setting detail: THERAPIES SERIES
Discharge: HOME | End: 2019-06-20

## 2019-06-20 DIAGNOSIS — Z98.890 STATUS POST LEFT ROTATOR CUFF REPAIR: Primary | ICD-10-CM

## 2019-06-20 PROCEDURE — 97110 THERAPEUTIC EXERCISES: CPT | Performed by: PHYSICAL THERAPIST

## 2019-06-20 PROCEDURE — 97140 MANUAL THERAPY 1/> REGIONS: CPT | Performed by: PHYSICAL THERAPIST

## 2019-06-20 NOTE — THERAPY TREATMENT NOTE
Outpatient Physical Therapy Ortho Treatment Note   [place]     Patient Name: Flores Seaman  : 1955  MRN: [id number]  Today's Date: 2019      Visit Date: 2019    Visit Dx:    ICD-10-CM ICD-9-CM   1. Status post left rotator cuff repair Z98.890 V45.89       Patient Active Problem List   Diagnosis   • Injury of glenoid labrum   • Complete tear of left rotator cuff   • Arthritis of left acromioclavicular joint   • Biceps tendinitis of left upper extremity   • Medial epicondylitis of elbow, right        Past Medical History:   Diagnosis Date   • Asthma    • History of anemia    • History of methicillin resistant staphylococcus aureus (MRSA)     PATRICIA KAUR - MARISEL BLOOM MD TREATED   • Rotator cuff injury     LEFT        Past Surgical History:   Procedure Laterality Date   • APPENDECTOMY     • COLONOSCOPY     • CYST REMOVAL      FOREHEAD   • SHOULDER ARTHROSCOPY W/ ROTATOR CUFF REPAIR Left 2019    Procedure: LEFT SHOULDER ARTHROSCOPY WITH ROTATOR CUFF REPAIR, SUBACROMIAL DECOMPRESSION AND BICEPS TENOTOMY;  Surgeon: Jarett Walker MD;  [place] [facility] [place] [facility] [place]: [place]opedics   • TONSILLECTOMY         PT Ortho     Row Name 19 0935       Subjective Comments    Subjective Comments  Pt states her shoulder was pretty sore on Tuesday, but she is feeling better today.  -    Row Name 19 1000       Subjective Comments    Subjective Comments  Pt states her shoulder is still sore.  -      User Key  (r) = Recorded By, (t) = Taken By, (c) = Cosigned By    Initials Name Provider Type     Liam Henson, PT Physical Therapist                      PT Assessment/Plan     Row Name 19 5444          PT Assessment    Assessment Comments  Pt is doing well with increased shoulder ROMnoted with her stretches.  -        PT Plan    PT Plan Comments  Pt is to continue her HEP daily.  -       User Key  (r) = Recorded By, (t) = Taken By, (c) = Cosigned By    Initials Name  Provider Type     Liam Henson, PT Physical Therapist          Modalities     Row Name 06/20/19 0935             Moist Heat    MH Applied  Yes  -GC      Location  (L) shoulder - pt seated w/ pillow for support  -GC      Rx Minutes  10 mins  -GC      MH Prior to Rx  Yes  -GC         Ice    Ice Applied  Yes  -GC      Location  left shoulder/medial upper arm w/ IFC - pt seated with pillow for support  -GC      Rx Minutes  15 mins  -GC      Ice S/P Rx  Yes  -GC         ELECTRICAL STIMULATION    Attended/Unattended  Unattended  -GC      Stimulation Type  IFC  -GC      Location/Electrode Placement/Other  left shoulder/upper arm  - seated with CP  -GC       PT E-Stim Unattended (Manual) Minutes  15  -GC         Other Treatment Provided    Taping / Bracing  Trial of kinesiotape using fan strip for bruising/edema medial, upper arm.  Pt to leave tape up to 5 days and trim as needed.  -GC        User Key  (r) = Recorded By, (t) = Taken By, (c) = Cosigned By    Initials Name Provider Type     Liam Henson, PT Physical Therapist        Exercises     Row Name 06/20/19 0935             Subjective Comments    Subjective Comments  Pt states her shoulder was pretty sore on Tuesday, but she is feeling better today.  -GC         Exercise 1    Exercise Name 1  Pulley-FLEX  -GC      Cueing 1  Verbal;Demo  -GC      Time 1  5 min  -GC         Exercise 2    Exercise Name 2  Pulley-Scaption  -GC      Cueing 2  Verbal;Demo  -GC      Time 2  5 min  -GC         Exercise 3    Exercise Name 3  Cane stretch-FLEX  -GC      Cueing 3  Verbal;Demo  -GC      Reps 3  15  -GC         Exercise 4    Exercise Name 4  Cane stretch-ABD  -GC      Cueing 4  Verbal;Demo  -GC      Reps 4  15  -GC         Exercise 5    Exercise Name 5  Cane stretch-ER  -GC      Cueing 5  Verbal;Demo  -GC      Reps 5  15  -GC         Exercise 6    Exercise Name 6  shoulder IR vs theraband  -GC      Cueing 6  Verbal;Demo  -GC      Reps 6  25  -GC      Time 6  red  -GC          Exercise 7    Exercise Name 7  shoulder ER vs therband  -GC      Cueing 7  Verbal;Demo  -GC      Reps 7  25  -GC      Time 7  red  -GC         Exercise 8    Exercise Name 8  Shoulder rows vs theraband  -GC      Cueing 8  Verbal;Demo  -GC      Reps 8  25  -GC      Time 8  red  -GC         Exercise 9    Exercise Name 9  Shoulder Extension vs theraband  -GC      Cueing 9  Verbal;Demo  -GC      Reps 9  25  -GC      Time 9  red  -GC         Exercise 10    Exercise Name 10  Scaption Up  -GC      Cueing 10  Verbal;Demo  -GC      Reps 10  25  -GC         Exercise 11    Exercise Name 11  Scaption down  -GC      Cueing 11  Verbal;Demo  -GC      Reps 11  25  -GC        User Key  (r) = Recorded By, (t) = Taken By, (c) = Cosigned By    Initials Name Provider Type     Liam Henson, PT Physical Therapist                      Manual Rx (last 36 hours)      Manual Treatments     Row Name 06/20/19 0935             Manual Rx 1    Manual Rx 1 Location  left shoulder  -GC      Manual Rx 1 Type  PROM into all planes 10-15 reps each  -GC      Manual Rx 1 Duration  15 min  -GC        User Key  (r) = Recorded By, (t) = Taken By, (c) = Cosigned By    Initials Name Provider Type     Liam Henson, PT Physical Therapist                             Time Calculation:   Start Time: 0935  Stop Time: 1044  Time Calculation (min): 69 min  Therapy Charges for Today     Code Description Service Date Service Provider Modifiers Qty    05562103768  PT MANUAL THERAPY EA 15 MIN 6/20/2019 Liam Henson, PT GP 1    42405679650 HC PT THER PROC EA 15 MIN 6/20/2019 Liam Henson, PT GP 1                    Liam Henson PT  6/20/2019

## 2019-06-24 ENCOUNTER — HOSPITAL ENCOUNTER (OUTPATIENT)
Dept: PHYSICAL THERAPY | Facility: HOSPITAL | Age: 64
Setting detail: THERAPIES SERIES
Discharge: HOME | End: 2019-06-24

## 2019-06-24 DIAGNOSIS — Z98.890 STATUS POST LEFT ROTATOR CUFF REPAIR: Primary | ICD-10-CM

## 2019-06-24 PROCEDURE — 97110 THERAPEUTIC EXERCISES: CPT | Performed by: PHYSICAL THERAPIST

## 2019-06-24 PROCEDURE — 97140 MANUAL THERAPY 1/> REGIONS: CPT | Performed by: PHYSICAL THERAPIST

## 2019-06-24 NOTE — THERAPY TREATMENT NOTE
Outpatient Physical Therapy Ortho Treatment Note   [place]     Patient Name: Flores Seaman  : 1955  MRN: [id number]  Today's Date: 2019      Visit Date: 2019    Visit Dx:    ICD-10-CM ICD-9-CM   1. Status post left rotator cuff repair Z98.890 V45.89       Patient Active Problem List   Diagnosis   • Injury of glenoid labrum   • Complete tear of left rotator cuff   • Arthritis of left acromioclavicular joint   • Biceps tendinitis of left upper extremity   • Medial epicondylitis of elbow, right        Past Medical History:   Diagnosis Date   • Asthma    • History of anemia    • History of methicillin resistant staphylococcus aureus (MRSA)     IFROM A NATASHA - MARISEL BLOOM MD TREATED   • Rotator cuff injury     LEFT        Past Surgical History:   Procedure Laterality Date   • APPENDECTOMY     • COLONOSCOPY     • CYST REMOVAL      FOREHEAD   • SHOULDER ARTHROSCOPY W/ ROTATOR CUFF REPAIR Left 2019    Procedure: LEFT SHOULDER ARTHROSCOPY WITH ROTATOR CUFF REPAIR, SUBACROMIAL DECOMPRESSION AND BICEPS TENOTOMY;  Surgeon: Jarett Walker MD;  Location: [facility] [place];  Service: Orthopedics   • TONSILLECTOMY                         PT Assessment/Plan     Row Name 19 1120          PT Assessment    Assessment Comments  Pt tolerated her exercise progression well. She is showing increasing PROM with her stretches and increased AROM while performing her strengthening exercises.  -GC        PT Plan    PT Plan Comments  Pt is to continue her HEP daily.  -GC       User Key  (r) = Recorded By, (t) = Taken By, (c) = Cosigned By    Initials Name Provider Type    GC Lima Henson, PT Physical Therapist          Modalities     Row Name 19 1120             Moist Heat    MH Applied  Yes  -GC      Location  (L) shoulder - pt seated w/ pillow for support  -GC      Rx Minutes  10 mins  -GC      MH Prior to Rx  Yes  -GC         Ice    Ice Applied  Yes  -GC      Location  left shoulder/medial  upper arm w/ IFC - pt seated with pillow for support  -GC      Rx Minutes  --  -GC      Ice S/P Rx  Yes  -GC         ELECTRICAL STIMULATION    Attended/Unattended  Unattended  -GC      Stimulation Type  IFC  -GC      Location/Electrode Placement/Other  left shoulder/upper arm  - seated with CP  -GC       PT E-Stim Unattended (Manual) Minutes  --  -GC         Other Treatment Provided    Taping / Bracing  Trial of kinesiotape using fan strip for bruising/edema medial, upper arm.  Pt to leave tape up to 5 days and trim as needed.  -GC        User Key  (r) = Recorded By, (t) = Taken By, (c) = Cosigned By    Initials Name Provider Type     Liam Henson, PT Physical Therapist        Exercises     Row Name 06/24/19 1120             Subjective Comments    Subjective Comments  Pt states her shoulder is a little sore this morning, but she thinks it is moving better.  -GC         Exercise 1    Exercise Name 1  Pulley-FLEX  -GC      Cueing 1  Verbal;Demo  -GC      Time 1  5 min  -GC         Exercise 2    Exercise Name 2  Pulley-Scaption  -GC      Cueing 2  Verbal;Demo  -GC      Time 2  5 min  -GC         Exercise 3    Exercise Name 3  Cane stretch-FLEX  -GC      Cueing 3  Verbal;Demo  -GC      Reps 3  15  -GC         Exercise 4    Exercise Name 4  Cane stretch-ABD  -GC      Cueing 4  Verbal;Demo  -GC      Reps 4  15  -GC         Exercise 5    Exercise Name 5  Cane stretch-ER  -GC      Cueing 5  Verbal;Demo  -GC      Reps 5  15  -GC         Exercise 6    Exercise Name 6  shoulder IR vs theraband  -GC      Cueing 6  Verbal;Demo  -GC      Reps 6  25  -GC      Time 6  green  -GC         Exercise 7    Exercise Name 7  shoulder ER vs therband  -GC      Cueing 7  Verbal;Demo  -GC      Reps 7  25  -GC      Time 7  green  -GC         Exercise 8    Exercise Name 8  Shoulder rows vs theraband  -GC      Cueing 8  Verbal;Demo  -GC      Reps 8  25  -GC      Time 8  green  -GC         Exercise 9    Exercise Name 9  Shoulder Extension  vs theraband  -GC      Cueing 9  Verbal;Demo  -GC      Reps 9  25  -GC      Time 9  green  -GC         Exercise 10    Exercise Name 10  Scaption Up  -GC      Cueing 10  Verbal;Demo  -GC      Reps 10  25  -GC      Time 10  1#  -GC         Exercise 11    Exercise Name 11  Scaption down  -GC      Cueing 11  Verbal;Demo  -GC      Reps 11  25  -GC      Time 11  1#  -GC         Exercise 12    Exercise Name 12  Sidelying ER  -GC      Cueing 12  Verbal;Tactile  -GC      Reps 12  25  -GC      Time 12  1#  -GC         Exercise 13    Exercise Name 13  Prone Hor ABD 90  -GC      Cueing 13  Verbal;Tactile  -GC      Reps 13  25  -GC         Exercise 14    Exercise Name 14  Prone Hor   -GC      Cueing 14  Verbal;Tactile  -GC      Reps 14  25  -GC        User Key  (r) = Recorded By, (t) = Taken By, (c) = Cosigned By    Initials Name Provider Type     Liam Henson, PT Physical Therapist                      Manual Rx (last 36 hours)      Manual Treatments     Row Name 06/24/19 1120             Manual Rx 1    Manual Rx 1 Location  left shoulder  -GC      Manual Rx 1 Type  PROM into all planes 10-15 reps each  -GC      Manual Rx 1 Duration  15 min  -GC        User Key  (r) = Recorded By, (t) = Taken By, (c) = Cosigned By    Initials Name Provider Type     Liam Henson PT Physical Therapist                             Time Calculation:   Start Time: 1120  Stop Time: 1221  Time Calculation (min): 61 min  Therapy Charges for Today     Code Description Service Date Service Provider Modifiers Qty    93448810506  PT MANUAL THERAPY EA 15 MIN 6/24/2019 Liam Henson, PT GP 1    77954306338 HC PT THER PROC EA 15 MIN 6/24/2019 Liam Henson, PT GP 1                    Liam Henson PT  6/24/2019

## 2019-06-27 ENCOUNTER — HOSPITAL ENCOUNTER (OUTPATIENT)
Dept: PHYSICAL THERAPY | Facility: HOSPITAL | Age: 64
Setting detail: THERAPIES SERIES
Discharge: HOME | End: 2019-06-27

## 2019-06-27 DIAGNOSIS — Z98.890 STATUS POST LEFT ROTATOR CUFF REPAIR: Primary | ICD-10-CM

## 2019-06-27 PROCEDURE — 97140 MANUAL THERAPY 1/> REGIONS: CPT | Performed by: PHYSICAL THERAPIST

## 2019-06-27 PROCEDURE — 97110 THERAPEUTIC EXERCISES: CPT | Performed by: PHYSICAL THERAPIST

## 2019-06-27 NOTE — THERAPY TREATMENT NOTE
Outpatient Physical Therapy Ortho Treatment Note   [place]     Patient Name: Flores Seaman  : 1955  MRN: [id number]  Today's Date: 2019      Visit Date: 2019    Visit Dx:    ICD-10-CM ICD-9-CM   1. Status post left rotator cuff repair Z98.890 V45.89       Patient Active Problem List   Diagnosis   • Injury of glenoid labrum   • Complete tear of left rotator cuff   • Arthritis of left acromioclavicular joint   • Biceps tendinitis of left upper extremity   • Medial epicondylitis of elbow, right        Past Medical History:   Diagnosis Date   • Asthma    • History of anemia    • History of methicillin resistant staphylococcus aureus (MRSA)     PATRICIA KAUR - MARISEL BLOOM MD TREATED   • Rotator cuff injury     LEFT        Past Surgical History:   Procedure Laterality Date   • APPENDECTOMY     • COLONOSCOPY     • CYST REMOVAL      FOREHEAD   • SHOULDER ARTHROSCOPY W/ ROTATOR CUFF REPAIR Left 2019    Procedure: LEFT SHOULDER ARTHROSCOPY WITH ROTATOR CUFF REPAIR, SUBACROMIAL DECOMPRESSION AND BICEPS TENOTOMY;  Surgeon: Jarett Walker MD;  [place] [facility] [place] [facility] [place]  Service: Orthopedics   • TONSILLECTOMY         PT Ortho     Row Name 19 1000       Left Upper Ext    Lt Shoulder Abduction AROM  --  -    Lt Shoulder Flexion AROM  --  -    Lt Shoulder External Rotation AROM  --  -GC    Lt Shoulder Internal Rotation AROM  --  -GC    Row Name 19 0930       Subjective Comments    Subjective Comments  Pt states her shoulder is feeling some better, a little looser.  -GC       Left Upper Ext    Lt Shoulder Abduction AROM  132 degrees  -GC    Lt Shoulder Flexion AROM  141 degrees  -GC    Lt Shoulder External Rotation AROM  61 degrees  -    Lt Shoulder Internal Rotation AROM  60 degrees  -      User Key  (r) = Recorded By, (t) = Taken By, (c) = Cosigned By    Initials Name Provider Type    Liam Godinez, PT Physical Therapist                      PT Assessment/Plan     Row  Name 06/27/19 0930          PT Assessment    Assessment Comments  Pt is showing some progress with increased shoulder ROM and improving function.  -GC        PT Plan    PT Plan Comments  Pt is to continue her HEP daily.  -GC       User Key  (r) = Recorded By, (t) = Taken By, (c) = Cosigned By    Initials Name Provider Type    Liam Godinez, PT Physical Therapist          Modalities     Row Name 06/27/19 0930             Moist Heat    MH Applied  Yes  -GC      Location  (L) shoulder - pt seated w/ pillow for support  -GC      Rx Minutes  10 mins  -GC      MH Prior to Rx  Yes  -GC         Ice    Ice Applied  Yes  -GC      Location  left shoulder/medial upper arm w/ IFC - pt seated with pillow for support  -GC      Ice S/P Rx  Yes  -GC         ELECTRICAL STIMULATION    Attended/Unattended  Unattended  -GC      Stimulation Type  IFC  -GC      Location/Electrode Placement/Other  left shoulder/upper arm  - seated with CP  -GC       PT E-Stim Unattended (Manual) Minutes  15  -GC         Other Treatment Provided    Taping / Bracing  Trial of kinesiotape using fan strip for bruising/edema medial, upper arm.  Pt to leave tape up to 5 days and trim as needed.  -GC        User Key  (r) = Recorded By, (t) = Taken By, (c) = Cosigned By    Initials Name Provider Type    Liam Godinez, PT Physical Therapist        Exercises     Row Name 06/27/19 0941 06/27/19 0930          Subjective Comments    Subjective Comments  --  Pt states her shoulder is feeling some better, a little looser.  -GC        Exercise 1    Exercise Name 1  --  -GC  Pulley-FLEX  -GC     Cueing 1  --  -GC  Verbal;Demo  -GC     Time 1  --  -GC  5 min  -GC        Exercise 2    Exercise Name 2  --  -GC  Pulley-Scaption  -GC     Cueing 2  --  -GC  Verbal;Demo  -GC     Time 2  --  -GC  5 min  -GC        Exercise 3    Exercise Name 3  --  -GC  Cane stretch-FLEX  -GC     Cueing 3  --  -GC  Verbal;Demo  -GC     Reps 3  --  -GC  15  -GC        Exercise 4     Exercise Name 4  --  -GC  Cane stretch-ABD  -GC     Cueing 4  --  -GC  Verbal;Demo  -GC     Reps 4  --  -GC  15  -GC        Exercise 5    Exercise Name 5  --  -GC  Cane stretch-ER  -GC     Cueing 5  --  -GC  Verbal;Demo  -GC     Reps 5  --  -GC  15  -GC        Exercise 6    Exercise Name 6  --  -GC  shoulder IR vs theraband  -GC     Cueing 6  --  -GC  Verbal;Demo  -GC     Reps 6  --  -GC  25  -GC     Time 6  --  -GC  green  -GC        Exercise 7    Exercise Name 7  --  -GC  shoulder ER vs therband  -GC     Cueing 7  --  -GC  Verbal;Demo  -GC     Reps 7  --  -GC  25  -GC     Time 7  --  -GC  green  -GC        Exercise 8    Exercise Name 8  --  -GC  Shoulder rows vs theraband  -GC     Cueing 8  --  -GC  Verbal;Demo  -GC     Reps 8  --  -GC  25  -GC     Time 8  --  -GC  blue  -GC        Exercise 9    Exercise Name 9  --  -GC  Shoulder Extension vs theraband  -GC     Cueing 9  --  -GC  Verbal;Demo  -GC     Reps 9  --  -GC  25  -GC     Time 9  --  -GC  blue  -GC        Exercise 10    Exercise Name 10  --  -GC  Scaption Up  -GC     Cueing 10  --  -GC  Verbal;Demo  -GC     Reps 10  --  -GC  25  -GC     Time 10  --  -GC  1#  -GC        Exercise 11    Exercise Name 11  --  -GC  Scaption down  -GC     Cueing 11  --  -GC  Verbal;Demo  -GC     Reps 11  --  -GC  25  -GC     Time 11  --  -GC  1#  -GC        Exercise 12    Exercise Name 12  --  -GC  Sidelying ER  -GC     Cueing 12  --  -GC  Verbal;Tactile  -GC     Reps 12  --  -GC  25  -GC     Time 12  --  -GC  1#  -GC        Exercise 13    Exercise Name 13  --  -GC  Prone Hor ABD 90  -GC     Cueing 13  --  -GC  Verbal;Tactile  -GC     Reps 13  --  -GC  25  -GC     Time 13  --  1#  -GC        Exercise 14    Exercise Name 14  --  -GC  Prone Hor   -GC     Cueing 14  --  -GC  Verbal;Tactile  -GC     Reps 14  --  -GC  25  -GC     Time 14  --  1#  -GC       User Key  (r) = Recorded By, (t) = Taken By, (c) = Cosigned By    Initials Name Provider Type    Liam Godinez, PT  Physical Therapist                      Manual Rx (last 36 hours)      Manual Treatments     Row Name 06/27/19 0930             Manual Rx 1    Manual Rx 1 Location  left shoulder  -GC      Manual Rx 1 Type  PROM into all planes 10-15 reps each  -GC      Manual Rx 1 Duration  15 min  -        User Key  (r) = Recorded By, (t) = Taken By, (c) = Cosigned By    Initials Name Provider Type    GC Liam Henson, PT Physical Therapist                             Time Calculation:   Start Time: 0930  Stop Time: 1044  Time Calculation (min): 74 min  Therapy Charges for Today     Code Description Service Date Service Provider Modifiers Qty    52733565347  PT MANUAL THERAPY EA 15 MIN 6/27/2019 Liam Henson, PT GP 1    80365232840 HC PT THER PROC EA 15 MIN 6/27/2019 Liam Henson, PT GP 1                    Liam Henson, VITOR  6/27/2019

## 2019-07-02 ENCOUNTER — HOSPITAL ENCOUNTER (OUTPATIENT)
Dept: PHYSICAL THERAPY | Facility: HOSPITAL | Age: 64
Setting detail: THERAPIES SERIES
Discharge: HOME | End: 2019-07-02

## 2019-07-02 DIAGNOSIS — Z98.890 STATUS POST LEFT ROTATOR CUFF REPAIR: Primary | ICD-10-CM

## 2019-07-02 PROCEDURE — G0283 ELEC STIM OTHER THAN WOUND: HCPCS

## 2019-07-02 PROCEDURE — 97140 MANUAL THERAPY 1/> REGIONS: CPT

## 2019-07-02 PROCEDURE — 97110 THERAPEUTIC EXERCISES: CPT

## 2019-07-02 NOTE — THERAPY TREATMENT NOTE
Outpatient Physical Therapy Ortho Treatment Note   [place]     Patient Name: Flores Seaman  : 1955  MRN: [id number]  Today's Date: 2019      Visit Date: 2019    Visit Dx:    ICD-10-CM ICD-9-CM   1. Status post left rotator cuff repair Z98.890 V45.89       Patient Active Problem List   Diagnosis   • Injury of glenoid labrum   • Complete tear of left rotator cuff   • Arthritis of left acromioclavicular joint   • Biceps tendinitis of left upper extremity   • Medial epicondylitis of elbow, right        Past Medical History:   Diagnosis Date   • Asthma    • History of anemia    • History of methicillin resistant staphylococcus aureus (MRSA)     PATRICIA KAUR - MARISEL BLOOM MD TREATED   • Rotator cuff injury     LEFT        Past Surgical History:   Procedure Laterality Date   • APPENDECTOMY     • COLONOSCOPY     • CYST REMOVAL      FOREHEAD   • SHOULDER ARTHROSCOPY W/ ROTATOR CUFF REPAIR Left 2019    Procedure: LEFT SHOULDER ARTHROSCOPY WITH ROTATOR CUFF REPAIR, SUBACROMIAL DECOMPRESSION AND BICEPS TENOTOMY;  Surgeon: Jarett Walker MD;  [place] [facility] [place] [facility] [place]: [place]opedics   • TONSILLECTOMY                         PT Assessment/Plan     Row Name 19 0978          PT Assessment    Assessment Comments  Pt tolerated progression of strengthening exercises well with reports of soreness at completion of session.   -KM        PT Plan    PT Plan Comments  Progress as tolerated.  -KM       User Key  (r) = Recorded By, (t) = Taken By, (c) = Cosigned By    Initials Name Provider Type     Meena Rizo PTA Physical Therapy Assistant            Exercises     Row Name 19 8946             Subjective Comments    Subjective Comments  Pt states she can tell the bands are getting easier and that her strength is getting better. She still c/o soreness and pain with certain movements.  -KM         Exercise 1    Exercise Name 1  Pulley-FLEX  -KM      Cueing 1  Verbal;Demo  -KM       Time 1  5 min  -KM         Exercise 2    Exercise Name 2  Pulley-Scaption  -KM      Cueing 2  Verbal;Demo  -KM      Time 2  5 min  -KM         Exercise 3    Exercise Name 3  Cane stretch-FLEX  -KM      Cueing 3  Verbal;Demo  -KM      Reps 3  15  -KM         Exercise 4    Exercise Name 4  Cane stretch-ABD  -KM      Cueing 4  Verbal;Demo  -KM      Reps 4  15  -KM         Exercise 5    Exercise Name 5  Cane stretch-ER  -KM      Cueing 5  Verbal;Demo  -KM      Reps 5  15  -KM         Exercise 6    Exercise Name 6  shoulder IR vs theraband  -KM      Cueing 6  Verbal;Demo  -KM      Reps 6  25  -KM      Time 6  blue  -KM         Exercise 7    Exercise Name 7  shoulder ER vs therband  -KM      Cueing 7  Verbal;Demo  -KM      Reps 7  25  -KM      Time 7  blue  -KM         Exercise 8    Exercise Name 8  Shoulder rows vs theraband  -KM      Cueing 8  Verbal;Demo  -KM      Reps 8  25  -KM      Time 8  black  -KM         Exercise 9    Exercise Name 9  Shoulder Extension vs theraband  -KM      Cueing 9  Verbal;Demo  -KM      Reps 9  25  -KM      Time 9  black  -KM         Exercise 10    Exercise Name 10  Scaption Up  -KM      Cueing 10  Verbal;Demo  -KM      Reps 10  25  -KM      Time 10  1#  -KM         Exercise 11    Exercise Name 11  Scaption down  -KM      Cueing 11  Verbal;Demo  -KM      Reps 11  25  -KM      Time 11  1#  -KM         Exercise 12    Exercise Name 12  Sidelying ER  -KM      Cueing 12  Verbal;Tactile  -KM      Reps 12  30  -KM      Time 12  1#  -KM         Exercise 13    Exercise Name 13  Prone Hor ABD 90  -KM      Cueing 13  Verbal;Tactile  -KM      Reps 13  30  -KM      Time 13  1#  -KM         Exercise 14    Exercise Name 14  Prone Hor   -KM      Cueing 14  Verbal;Tactile  -KM      Reps 14  30  -KM      Time 14  1#  -KM        User Key  (r) = Recorded By, (t) = Taken By, (c) = Cosigned By    Initials Name Provider Type    Meena Bernard, PTA Physical Therapy Assistant                      Manual  Rx (last 36 hours)      Manual Treatments     Row Name 07/02/19 0935             Manual Rx 1    Manual Rx 1 Location  left shoulder  -KM      Manual Rx 1 Type  PROM into all planes 10-15 reps each  -KM      Manual Rx 1 Duration  15 min  -KM        User Key  (r) = Recorded By, (t) = Taken By, (c) = Cosigned By    Initials Name Provider Type    Meena Bernard PTA Physical Therapy Assistant                             Time Calculation:   Start Time: 0935  Stop Time: 1052  Time Calculation (min): 77 min  Therapy Charges for Today     Code Description Service Date Service Provider Modifiers Qty    94141566070 HC PT ELECTRICAL STIM UNATTENDED 7/2/2019 Meena Rizo PTA  1    08148075737 HC PT MANUAL THERAPY EA 15 MIN 7/2/2019 Meena Rizo PTA GP 1    49791709415 HC PT THER PROC EA 15 MIN 7/2/2019 Meena Rizo PTA GP 1                    Meena Rizo PTA  7/2/2019

## 2019-07-05 ENCOUNTER — HOSPITAL ENCOUNTER (OUTPATIENT)
Dept: PHYSICAL THERAPY | Facility: HOSPITAL | Age: 64
Setting detail: THERAPIES SERIES
Discharge: HOME | End: 2019-07-05

## 2019-07-05 DIAGNOSIS — Z98.890 STATUS POST LEFT ROTATOR CUFF REPAIR: Primary | ICD-10-CM

## 2019-07-08 ENCOUNTER — HOSPITAL ENCOUNTER (OUTPATIENT)
Dept: PHYSICAL THERAPY | Facility: HOSPITAL | Age: 64
Setting detail: THERAPIES SERIES
Discharge: HOME | End: 2019-07-08

## 2019-07-08 DIAGNOSIS — Z98.890 STATUS POST LEFT ROTATOR CUFF REPAIR: Primary | ICD-10-CM

## 2019-07-08 PROCEDURE — 97110 THERAPEUTIC EXERCISES: CPT

## 2019-07-08 PROCEDURE — 97140 MANUAL THERAPY 1/> REGIONS: CPT

## 2019-07-08 NOTE — THERAPY TREATMENT NOTE
Outpatient Physical Therapy Ortho Treatment Note   [facility]     Patient Name: Flores Seaman  : 1955  MRN: [id number]  Today's Date: 2019      Visit Date: 2019    Visit Dx:    ICD-10-CM ICD-9-CM   1. Status post left rotator cuff repair Z98.890 V45.89       Patient Active Problem List   Diagnosis   • Injury of glenoid labrum   • Complete tear of left rotator cuff   • Arthritis of left acromioclavicular joint   • Biceps tendinitis of left upper extremity   • Medial epicondylitis of elbow, right        Past Medical History:   Diagnosis Date   • Asthma    • History of anemia    • History of methicillin resistant staphylococcus aureus (MRSA)     PATRICIA KAUR - MARISEL BLOOM MD TREATED   • Rotator cuff injury     LEFT        Past Surgical History:   Procedure Laterality Date   • APPENDECTOMY     • COLONOSCOPY     • CYST REMOVAL      FOREHEAD   • SHOULDER ARTHROSCOPY W/ ROTATOR CUFF REPAIR Left 2019    Procedure: LEFT SHOULDER ARTHROSCOPY WITH ROTATOR CUFF REPAIR, SUBACROMIAL DECOMPRESSION AND BICEPS TENOTOMY;  Surgeon: Jarett Walker MD;  [place] [facility] [place] [facility] [place]  Service: Orthopedics   • TONSILLECTOMY                         PT Assessment/Plan     Row Name 19 1200          PT Assessment    Assessment Comments  Pt continues to progress well with strengthening program.   -KM        PT Plan    PT Plan Comments  Progress as tolerated  -KM       User Key  (r) = Recorded By, (t) = Taken By, (c) = Cosigned By    Initials Name Provider Type     Meena Rizo PTA Physical Therapy Assistant          Modalities     Row Name 19 1200 19 1110          Moist Heat    MH Applied  Yes  -KM  --     Location  (L) shoulder - pt seated w/ pillow for support  -KM  (L) shoulder - pt seated w/ pillow for support  -KM     Rx Minutes  10 mins  -KM  10 mins  -KM     MH Prior to Rx  Yes  -KM  Yes  -KM        Ice    Ice Applied  Yes  -KM  --     Location  left shoulder/medial upper arm w/  IFC - pt seated with pillow for support  -KM  left shoulder/medial upper arm w/ IFC - pt seated with pillow for support  -KM     Ice S/P Rx  Yes  -KM  Yes  -KM        ELECTRICAL STIMULATION    Attended/Unattended  Unattended  -KM  Unattended  -KM     Stimulation Type  IFC  -KM  IFC  -KM     Location/Electrode Placement/Other  left shoulder/upper arm  - seated with CP  -KM  left shoulder/upper arm  - seated with CP  -KM        Other Treatment Provided    Taping / Bracing  Trial of kinesiotape using fan strip for bruising/edema medial, upper arm.  Pt to leave tape up to 5 days and trim as needed.  -KM  Trial of kinesiotape using fan strip for bruising/edema medial, upper arm.  Pt to leave tape up to 5 days and trim as needed.  -KM       User Key  (r) = Recorded By, (t) = Taken By, (c) = Cosigned By    Initials Name Provider Type    Meena Bernard PTA Physical Therapy Assistant        Exercises     Row Name 07/08/19 1200             Subjective Comments    Subjective Comments  Pt states her shoulder is doing pretty good and had a positive response to the nerve glides added previous treatment session  -KM         Exercise 1    Exercise Name 1  Pulley-FLEX  -KM      Cueing 1  Verbal;Demo  -KM      Time 1  5 min  -KM         Exercise 2    Exercise Name 2  Pulley-Scaption  -KM      Cueing 2  Verbal;Demo  -KM      Time 2  5 min  -KM         Exercise 3    Exercise Name 3  Cane stretch-FLEX  -KM      Cueing 3  Verbal;Demo  -KM      Reps 3  15  -KM         Exercise 4    Exercise Name 4  Cane stretch-ABD  -KM      Cueing 4  Verbal;Demo  -KM      Reps 4  15  -KM         Exercise 5    Exercise Name 5  Cane stretch-ER  -KM      Cueing 5  Verbal;Demo  -KM      Reps 5  15  -KM         Exercise 6    Exercise Name 6  shoulder IR vs theraband  -KM      Cueing 6  Verbal;Demo  -KM      Reps 6  25  -KM      Time 6  black  -KM         Exercise 7    Exercise Name 7  shoulder ER vs therband  -KM      Cueing 7  Verbal;Demo  -KM      Reps 7   25  -KM      Time 7  black  -KM         Exercise 8    Exercise Name 8  Shoulder rows vs theraband  -KM      Cueing 8  Verbal;Demo  -KM      Reps 8  25  -KM      Time 8  silver  -KM         Exercise 9    Exercise Name 9  Shoulder Extension vs theraband  -KM      Cueing 9  Verbal;Demo  -KM      Reps 9  25  -KM      Time 9  silver  -KM         Exercise 10    Exercise Name 10  Scaption Up  -KM      Cueing 10  Verbal;Demo  -KM      Reps 10  25  -KM      Time 10  2#  -KM         Exercise 11    Exercise Name 11  Scaption down  -KM      Cueing 11  Verbal;Demo  -KM      Reps 11  25  -KM      Time 11  2#  -KM         Exercise 12    Exercise Name 12  Sidelying ER  -KM      Cueing 12  Verbal;Tactile  -KM      Reps 12  30  -KM      Time 12  2#  -KM         Exercise 13    Exercise Name 13  Prone Hor ABD 90  -KM      Cueing 13  Verbal;Tactile  -KM      Reps 13  30  -KM      Time 13  2#  -KM         Exercise 14    Exercise Name 14  Prone Hor   -KM      Cueing 14  Verbal;Tactile  -KM      Reps 14  30  -KM      Time 14  2#  -KM        User Key  (r) = Recorded By, (t) = Taken By, (c) = Cosigned By    Initials Name Provider Type    Meena Bernard PTA Physical Therapy Assistant                                          Time Calculation:   Start Time: 1110  Stop Time: 1220  Time Calculation (min): 70 min  Therapy Charges for Today     Code Description Service Date Service Provider Modifiers Qty    05910201636 HC PT MANUAL THERAPY EA 15 MIN 7/8/2019 Meena Rizo PTA GP 1    34539141021 HC PT THER PROC EA 15 MIN 7/8/2019 Meena Rizo PTA GP 1                    Meena Rizo PTA  7/8/2019

## 2019-07-11 ENCOUNTER — HOSPITAL ENCOUNTER (OUTPATIENT)
Dept: PHYSICAL THERAPY | Facility: HOSPITAL | Age: 64
Setting detail: THERAPIES SERIES
Discharge: HOME | End: 2019-07-11

## 2019-07-11 DIAGNOSIS — Z98.890 STATUS POST LEFT ROTATOR CUFF REPAIR: Primary | ICD-10-CM

## 2019-07-11 PROCEDURE — 97140 MANUAL THERAPY 1/> REGIONS: CPT

## 2019-07-11 PROCEDURE — 97110 THERAPEUTIC EXERCISES: CPT

## 2019-07-11 NOTE — THERAPY TREATMENT NOTE
Outpatient Physical Therapy Ortho Treatment Note   [place]     Patient Name: Flores Seaman  : 1955  MRN: [id number]  Today's Date: 2019      Visit Date: 2019    Visit Dx:    ICD-10-CM ICD-9-CM   1. Status post left rotator cuff repair Z98.890 V45.89       Patient Active Problem List   Diagnosis   • Injury of glenoid labrum   • Complete tear of left rotator cuff   • Arthritis of left acromioclavicular joint   • Biceps tendinitis of left upper extremity   • Medial epicondylitis of elbow, right        Past Medical History:   Diagnosis Date   • Asthma    • History of anemia    • History of methicillin resistant staphylococcus aureus (MRSA)     IFROM KAIYT KAUR - MARISEL BLOOM MD TREATED   • Rotator cuff injury     LEFT        Past Surgical History:   Procedure Laterality Date   • APPENDECTOMY     • COLONOSCOPY     • CYST REMOVAL      FOREHEAD   • SHOULDER ARTHROSCOPY W/ ROTATOR CUFF REPAIR Left 2019    Procedure: LEFT SHOULDER ARTHROSCOPY WITH ROTATOR CUFF REPAIR, SUBACROMIAL DECOMPRESSION AND BICEPS TENOTOMY;  Surgeon: Jarett Walker MD;  [place]: [facility] [place] [facility] [place]  Service: Orthopedics   • TONSILLECTOMY                         PT Assessment/Plan     Row Name 19 0935          PT Assessment    Assessment Comments  Pt demonstrates increased tightness with IR with PROM. Sleeper stretch added to plan to improve IR ROM.   -KM        PT Plan    PT Plan Comments  Pt to complete HEP. Progress as tolerated  -KM       User Key  (r) = Recorded By, (t) = Taken By, (c) = Cosigned By    Initials Name Provider Type     Meena Rizo PTA Physical Therapy Assistant          Modalities     Row Name 19 0935             Moist Heat    MH Applied  Yes  -KM      Location  (L) shoulder - pt seated w/ pillow for support  -KM      Rx Minutes  10 mins  -KM      MH Prior to Rx  Yes  -KM         Ice    Ice Applied  Yes  -KM      Location  left shoulder/medial upper arm w/ IFC - pt seated with  pillow for support  -KM      Ice S/P Rx  Yes  -KM         ELECTRICAL STIMULATION    Attended/Unattended  Unattended  -KM      Stimulation Type  IFC  -KM      Location/Electrode Placement/Other  left shoulder/upper arm  - seated with CP  -KM         Other Treatment Provided    Taping / Bracing  Trial of kinesiotape using fan strip for bruising/edema medial, upper arm.  Pt to leave tape up to 5 days and trim as needed.  -KM        User Key  (r) = Recorded By, (t) = Taken By, (c) = Cosigned By    Initials Name Provider Type    Meena Bernard PTA Physical Therapy Assistant        Exercises     Row Name 07/11/19 0935             Subjective Comments    Subjective Comments  Pt states she continues to complete little tasks around the house each day, however, she continues to experience pain at the end of the day.  -KM         Exercise 1    Exercise Name 1  Pulley-FLEX  -KM      Cueing 1  Verbal;Demo  -KM      Time 1  5 min  -KM         Exercise 2    Exercise Name 2  Pulley-Scaption  -KM      Cueing 2  Verbal;Demo  -KM      Time 2  5 min  -KM         Exercise 3    Exercise Name 3  Cane stretch-FLEX  -KM      Cueing 3  Verbal;Demo  -KM      Reps 3  15  -KM         Exercise 4    Exercise Name 4  Cane stretch-ABD  -KM      Cueing 4  Verbal;Demo  -KM      Reps 4  15  -KM         Exercise 5    Exercise Name 5  Cane stretch-ER  -KM      Cueing 5  Verbal;Demo  -KM      Reps 5  15  -KM         Exercise 6    Exercise Name 6  shoulder IR vs theraband  -KM      Cueing 6  Verbal;Demo  -KM      Reps 6  25  -KM      Time 6  black  -KM         Exercise 7    Exercise Name 7  shoulder ER vs therband  -KM      Cueing 7  Verbal;Demo  -KM      Reps 7  25  -KM      Time 7  black  -KM         Exercise 8    Exercise Name 8  Shoulder rows vs theraband  -KM      Cueing 8  Verbal;Demo  -KM      Reps 8  25  -KM      Time 8  silver  -KM         Exercise 9    Exercise Name 9  Shoulder Extension vs theraband  -KM      Cueing 9  Verbal;Demo  -KM       Reps 9  25  -KM      Time 9  silver  -KM         Exercise 10    Exercise Name 10  Scaption Up  -KM      Cueing 10  Verbal;Demo  -KM      Reps 10  25  -KM      Time 10  2#  -KM         Exercise 11    Exercise Name 11  Scaption down  -KM      Cueing 11  Verbal;Demo  -KM      Reps 11  25  -KM      Time 11  2#  -KM         Exercise 12    Exercise Name 12  Sidelying ER  -KM      Cueing 12  Verbal;Tactile  -KM      Reps 12  30  -KM      Time 12  2#  -KM         Exercise 13    Exercise Name 13  Prone Hor ABD 90  -KM      Cueing 13  Verbal;Tactile  -KM      Reps 13  30  -KM      Time 13  2#  -KM         Exercise 14    Exercise Name 14  Prone Hor   -KM      Cueing 14  Verbal;Tactile  -KM      Reps 14  30  -KM      Time 14  2#  -KM         Exercise 15    Exercise Name 15  Sleep Stretch  -KM      Cueing 15  Verbal;Tactile  -KM      Reps 15  10x10  -KM        User Key  (r) = Recorded By, (t) = Taken By, (c) = Cosigned By    Initials Name Provider Type    Meena Bernard PTA Physical Therapy Assistant                      Manual Rx (last 36 hours)      Manual Treatments     Row Name 07/11/19 0935             Manual Rx 1    Manual Rx 1 Location  left shoulder  -KM      Manual Rx 1 Type  PROM into all planes 10-15 reps each  -KM      Manual Rx 1 Duration  15 min  -KM        User Key  (r) = Recorded By, (t) = Taken By, (c) = Cosigned By    Initials Name Provider Type    Meena Bernard PTA Physical Therapy Assistant                             Time Calculation:   Start Time: 0935  Stop Time: 1100  Time Calculation (min): 85 min  Therapy Charges for Today     Code Description Service Date Service Provider Modifiers Qty    14873646175 HC PT MANUAL THERAPY EA 15 MIN 7/11/2019 Meena Rzio PTA GP 1    07554964974 HC PT THER PROC EA 15 MIN 7/11/2019 Meena Rizo PTA GP 1                    Meena Rizo PTA  7/11/2019

## 2019-07-16 ENCOUNTER — HOSPITAL ENCOUNTER (OUTPATIENT)
Dept: PHYSICAL THERAPY | Facility: HOSPITAL | Age: 64
Setting detail: THERAPIES SERIES
Discharge: HOME | End: 2019-07-16

## 2019-07-16 DIAGNOSIS — Z98.890 STATUS POST LEFT ROTATOR CUFF REPAIR: Primary | ICD-10-CM

## 2019-07-16 PROCEDURE — 97110 THERAPEUTIC EXERCISES: CPT

## 2019-07-16 PROCEDURE — G0283 ELEC STIM OTHER THAN WOUND: HCPCS

## 2019-07-16 PROCEDURE — 97140 MANUAL THERAPY 1/> REGIONS: CPT

## 2019-07-16 NOTE — THERAPY TREATMENT NOTE
Outpatient Physical Therapy Ortho Treatment Note   [facility]     Patient Name: Flores Seaman  : 1955  MRN: [id number]  Today's Date: 2019      Visit Date: 2019    Visit Dx:    ICD-10-CM ICD-9-CM   1. Status post left rotator cuff repair Z98.890 V45.89       Patient Active Problem List   Diagnosis   • Injury of glenoid labrum   • Complete tear of left rotator cuff   • Arthritis of left acromioclavicular joint   • Biceps tendinitis of left upper extremity   • Medial epicondylitis of elbow, right        Past Medical History:   Diagnosis Date   • Asthma    • History of anemia    • History of methicillin resistant staphylococcus aureus (MRSA)     PATRICIA KAUR - MARISEL BLOOM MD TREATED   • Rotator cuff injury     LEFT        Past Surgical History:   Procedure Laterality Date   • APPENDECTOMY     • COLONOSCOPY     • CYST REMOVAL      FOREHEAD   • SHOULDER ARTHROSCOPY W/ ROTATOR CUFF REPAIR Left 2019    Procedure: LEFT SHOULDER ARTHROSCOPY WITH ROTATOR CUFF REPAIR, SUBACROMIAL DECOMPRESSION AND BICEPS TENOTOMY;  Surgeon: Jarett Walker MD;  Location: [facility] [place];  Service: Orthopedics   • TONSILLECTOMY                         PT Assessment/Plan     Row Name 19 0935          PT Assessment    Assessment Comments  Pt continues to respond well with nerve glides/manual. Improved tolerance noted with progression of strengthening and PROM.   -KM        PT Plan    PT Plan Comments  Continue per POC  -KM       User Key  (r) = Recorded By, (t) = Taken By, (c) = Cosigned By    Initials Name Provider Type     Meena Rizo PTA Physical Therapy Assistant          Modalities     Row Name 19 0935             Moist Heat    MH Applied  Yes  -KM      Location  (L) shoulder - pt seated w/ pillow for support  -KM      Rx Minutes  10 mins  -KM      MH Prior to Rx  Yes  -KM         Ice    Ice Applied  Yes  -KM      Location  left shoulder/medial upper arm w/ IFC - pt seated with pillow  for support  -KM      Ice S/P Rx  Yes  -KM         ELECTRICAL STIMULATION    Attended/Unattended  Unattended  -KM      Stimulation Type  IFC  -KM      Location/Electrode Placement/Other  left shoulder/upper arm  - seated with CP  -KM         Other Treatment Provided    Taping / Bracing  Trial of kinesiotape using fan strip for bruising/edema medial, upper arm.  Pt to leave tape up to 5 days and trim as needed.  -KM        User Key  (r) = Recorded By, (t) = Taken By, (c) = Cosigned By    Initials Name Provider Type    Meena Bernard, NAHOMI Physical Therapy Assistant        Exercises     Row Name 07/16/19 0935             Subjective Comments    Subjective Comments  Pt states she feels sore about the shoulder joint and has decreased symptoms in her UT/neck. She states she still experiences some limitations with IR and horizontal adduction  -KM         Exercise 1    Exercise Name 1  Pulley-FLEX  -KM      Cueing 1  Verbal;Demo  -KM      Time 1  5 min  -KM         Exercise 2    Exercise Name 2  Pulley-Scaption  -KM      Cueing 2  Verbal;Demo  -KM      Time 2  5 min  -KM         Exercise 3    Exercise Name 3  Cane stretch-FLEX  -KM      Cueing 3  Verbal;Demo  -KM      Reps 3  15  -KM         Exercise 4    Exercise Name 4  Cane stretch-ABD  -KM      Cueing 4  Verbal;Demo  -KM      Reps 4  15  -KM         Exercise 5    Exercise Name 5  Cane stretch-ER  -KM      Cueing 5  Verbal;Demo  -KM      Reps 5  15  -KM         Exercise 6    Exercise Name 6  shoulder IR vs theraband  -KM      Cueing 6  Verbal;Demo  -KM      Reps 6  25  -KM      Time 6  silver  -KM         Exercise 7    Exercise Name 7  shoulder ER vs therband  -KM      Cueing 7  Verbal;Demo  -KM      Reps 7  25  -KM      Time 7  silver  -KM         Exercise 8    Exercise Name 8  Shoulder rows vs theraband  -KM      Cueing 8  Verbal;Demo  -KM      Reps 8  25  -KM      Time 8  silver  -KM         Exercise 9    Exercise Name 9  Shoulder Extension vs theraband  -KM       Cueing 9  Verbal;Demo  -KM      Reps 9  25  -KM      Time 9  silver  -KM         Exercise 10    Exercise Name 10  Scaption Up  -KM      Cueing 10  Verbal;Demo  -KM      Reps 10  25  -KM      Time 10  3#  -KM         Exercise 11    Exercise Name 11  Scaption down  -KM      Cueing 11  Verbal;Demo  -KM      Reps 11  25  -KM      Time 11  2#  -KM         Exercise 12    Exercise Name 12  Sidelying ER  -KM      Cueing 12  Verbal;Tactile  -KM      Reps 12  25  -KM      Time 12  3#  -KM         Exercise 13    Exercise Name 13  Prone Hor ABD 90  -KM      Cueing 13  Verbal;Tactile  -KM      Reps 13  25  -KM      Time 13  2#  -KM         Exercise 14    Exercise Name 14  Prone Hor   -KM      Cueing 14  Verbal;Tactile  -KM      Reps 14  25  -KM      Time 14  2#  -KM         Exercise 15    Exercise Name 15  Sleep Stretch  -KM      Cueing 15  Verbal;Tactile  -KM      Reps 15  10x10  -KM        User Key  (r) = Recorded By, (t) = Taken By, (c) = Cosigned By    Initials Name Provider Type    Meena Bernard PTA Physical Therapy Assistant                      Manual Rx (last 36 hours)      Manual Treatments     Row Name 07/16/19 0935             Manual Rx 1    Manual Rx 1 Location  left shoulder  -KM      Manual Rx 1 Type  PROM into all planes 10-15 reps each  -KM      Manual Rx 1 Duration  15 min  -KM        User Key  (r) = Recorded By, (t) = Taken By, (c) = Cosigned By    Initials Name Provider Type    Meena Bernard PTA Physical Therapy Assistant                             Time Calculation:   Start Time: 0935  Stop Time: 1050  Time Calculation (min): 75 min  Therapy Charges for Today     Code Description Service Date Service Provider Modifiers Qty    21221429727 HC PT MANUAL THERAPY EA 15 MIN 7/16/2019 Meena Rizo PTA GP 1    22968473228 HC PT THER PROC EA 15 MIN 7/16/2019 Meena Rizo PTA GP 1    11201294220 HC PT ELECTRICAL STIM UNATTENDED 7/16/2019 Meena Rizo PTA  1                    Meena Rizo  PTA  7/16/2019

## 2019-07-19 ENCOUNTER — HOSPITAL ENCOUNTER (OUTPATIENT)
Dept: PHYSICAL THERAPY | Facility: HOSPITAL | Age: 64
Setting detail: THERAPIES SERIES
Discharge: HOME | End: 2019-07-19

## 2019-07-19 DIAGNOSIS — Z98.890 STATUS POST LEFT ROTATOR CUFF REPAIR: Primary | ICD-10-CM

## 2019-07-19 PROCEDURE — 97140 MANUAL THERAPY 1/> REGIONS: CPT | Performed by: PHYSICAL THERAPIST

## 2019-07-19 PROCEDURE — 97110 THERAPEUTIC EXERCISES: CPT | Performed by: PHYSICAL THERAPIST

## 2019-07-19 PROCEDURE — G0283 ELEC STIM OTHER THAN WOUND: HCPCS | Performed by: PHYSICAL THERAPIST

## 2019-07-19 NOTE — THERAPY TREATMENT NOTE
Outpatient Physical Therapy Ortho Treatment Note   [facility]     Patient Name: Flores Seaman  : 1955  MRN: [id number]  Today's Date: 2019      Visit Date: 2019    Visit Dx:    ICD-10-CM ICD-9-CM   1. Status post left rotator cuff repair Z98.890 V45.89       Patient Active Problem List   Diagnosis   • Injury of glenoid labrum   • Complete tear of left rotator cuff   • Arthritis of left acromioclavicular joint   • Biceps tendinitis of left upper extremity   • Medial epicondylitis of elbow, right        Past Medical History:   Diagnosis Date   • Asthma    • History of anemia    • History of methicillin resistant staphylococcus aureus (MRSA)     PATRICIA BLOOM MD TREATED   • Rotator cuff injury     LEFT        Past Surgical History:   Procedure Laterality Date   • APPENDECTOMY     • COLONOSCOPY     • CYST REMOVAL      FOREHEAD   • SHOULDER ARTHROSCOPY W/ ROTATOR CUFF REPAIR Left 2019    Procedure: LEFT SHOULDER ARTHROSCOPY WITH ROTATOR CUFF REPAIR, SUBACROMIAL DECOMPRESSION AND BICEPS TENOTOMY;  Surgeon: Jarett Walker MD;  [place] [facility] [place] [facility] [place]  Service: Orthopedics   • TONSILLECTOMY         PT Ortho     Row Name 19 09       Subjective Comments    Subjective Comments  Pt states she did some gardening yesterday and she is pretty sore this morning.  -      User Key  (r) = Recorded By, (t) = Taken By, (c) = Cosigned By    Initials Name Provider Type    Liam Godinez PT Physical Therapist                      PT Assessment/Plan     Row Name 19 58          PT Assessment    Assessment Comments  Pt is doing well with increasing shoulder ROMnoted with the stretches. There does not appear to be any residuals from her gardening yesterday.  -        PT Plan    PT Plan Comments  Pt is to continue her HEP daily.  -       User Key  (r) = Recorded By, (t) = Taken By, (c) = Cosigned By    Initials Name Provider Type    Liam Godinez PT Physical  Therapist          Modalities     Row Name 07/19/19 0930             Moist Heat    MH Applied  Yes  -GC      Location  (L) shoulder - pt seated w/ pillow for support  -GC      Rx Minutes  10 mins  -GC      MH Prior to Rx  Yes  -GC         Ice    Ice Applied  Yes  -GC      Location  left shoulder/medial upper arm w/ IFC - pt seated with pillow for support  -GC      Ice S/P Rx  Yes  -GC         ELECTRICAL STIMULATION    Attended/Unattended  Unattended  -GC      Stimulation Type  IFC  -GC      Location/Electrode Placement/Other  left shoulder/upper arm  - seated with CP  -GC       PT E-Stim Unattended (Manual) Minutes  15  -GC         Other Treatment Provided    Taping / Bracing  Trial of kinesiotape using fan strip for bruising/edema medial, upper arm.  Pt to leave tape up to 5 days and trim as needed.  -GC        User Key  (r) = Recorded By, (t) = Taken By, (c) = Cosigned By    Initials Name Provider Type    Liam Godinez, PT Physical Therapist        Exercises     Row Name 07/19/19 0930             Subjective Comments    Subjective Comments  Pt states she did some gardening yesterday and she is pretty sore this morning.  -GC         Exercise 1    Exercise Name 1  Pulley-FLEX  -GC      Cueing 1  Verbal;Demo  -GC      Time 1  5 min  -GC         Exercise 2    Exercise Name 2  Pulley-Scaption  -GC      Cueing 2  Verbal;Demo  -GC      Time 2  5 min  -GC         Exercise 3    Exercise Name 3  Cane stretch-FLEX  -GC      Cueing 3  Verbal;Demo  -GC      Reps 3  15  -GC         Exercise 4    Exercise Name 4  Cane stretch-ABD  -GC      Cueing 4  Verbal;Demo  -GC      Reps 4  15  -GC         Exercise 5    Exercise Name 5  Cane stretch-ER  -GC      Cueing 5  Verbal;Demo  -GC      Reps 5  15  -GC         Exercise 6    Exercise Name 6  shoulder IR vs theraband  -GC      Cueing 6  Verbal;Demo  -GC      Reps 6  25  -GC      Time 6  silver  -GC         Exercise 7    Exercise Name 7  shoulder ER vs therband  -GC      Cueing  7  Verbal;Demo  -GC      Reps 7  25  -GC      Time 7  silver  -GC         Exercise 8    Exercise Name 8  Shoulder rows vs theraband  -GC      Cueing 8  Verbal;Demo  -GC      Reps 8  25  -GC      Time 8  silver  -GC         Exercise 9    Exercise Name 9  Shoulder Extension vs theraband  -GC      Cueing 9  Verbal;Demo  -GC      Reps 9  25  -GC      Time 9  silver  -GC         Exercise 10    Exercise Name 10  Scaption Up  -GC      Cueing 10  Verbal;Demo  -GC      Reps 10  25  -GC      Time 10  3#  -GC         Exercise 11    Exercise Name 11  Scaption down  -GC      Cueing 11  Verbal;Demo  -GC      Reps 11  25  -GC      Time 11  2#  -GC         Exercise 12    Exercise Name 12  Sidelying ER  -GC      Cueing 12  Verbal;Tactile  -GC      Reps 12  25  -GC      Time 12  3#  -GC         Exercise 13    Exercise Name 13  Prone Hor ABD 90  -GC      Cueing 13  Verbal;Tactile  -GC      Reps 13  25  -GC      Time 13  2#  -GC         Exercise 14    Exercise Name 14  Prone Hor   -GC      Cueing 14  Verbal;Tactile  -GC      Reps 14  25  -GC      Time 14  2#  -GC         Exercise 15    Exercise Name 15  Sleep Stretch  -GC      Cueing 15  Verbal;Tactile  -GC      Reps 15  10x10  -GC        User Key  (r) = Recorded By, (t) = Taken By, (c) = Cosigned By    Initials Name Provider Type    GC Liam Henson, PT Physical Therapist                      Manual Rx (last 36 hours)      Manual Treatments     Row Name 07/19/19 0930             Manual Rx 1    Manual Rx 1 Location  left shoulder  -GC      Manual Rx 1 Type  PROM into all planes 10-15 reps each  -GC      Manual Rx 1 Duration  15 min  -GC        User Key  (r) = Recorded By, (t) = Taken By, (c) = Cosigned By    Initials Name Provider Type    GC Liam Henson, PT Physical Therapist                             Time Calculation:   Start Time: 0930  Stop Time: 1100  Time Calculation (min): 90 min  Therapy Charges for Today     Code Description Service Date Service Provider Modifiers  Qty    65558263647 HC PT ELECTRICAL STIM UNATTENDED 7/19/2019 Liam Henson, PT  1    50387911945 HC PT MANUAL THERAPY EA 15 MIN 7/19/2019 Liam Henson, PT GP 1    68229108025 HC PT THER PROC EA 15 MIN 7/19/2019 Liam Henson, PT GP 1                    Liam Henson, PT  7/19/2019

## 2019-07-22 ENCOUNTER — HOSPITAL ENCOUNTER (OUTPATIENT)
Dept: PHYSICAL THERAPY | Facility: HOSPITAL | Age: 64
Setting detail: THERAPIES SERIES
Discharge: HOME | End: 2019-07-22

## 2019-07-22 DIAGNOSIS — Z98.890 STATUS POST LEFT ROTATOR CUFF REPAIR: Primary | ICD-10-CM

## 2019-07-22 PROCEDURE — 97140 MANUAL THERAPY 1/> REGIONS: CPT | Performed by: PHYSICAL THERAPIST

## 2019-07-22 PROCEDURE — 97110 THERAPEUTIC EXERCISES: CPT | Performed by: PHYSICAL THERAPIST

## 2019-07-25 ENCOUNTER — HOSPITAL ENCOUNTER (OUTPATIENT)
Dept: PHYSICAL THERAPY | Facility: HOSPITAL | Age: 64
Setting detail: THERAPIES SERIES
Discharge: HOME | End: 2019-07-25

## 2019-07-25 DIAGNOSIS — Z98.890 STATUS POST LEFT ROTATOR CUFF REPAIR: Primary | ICD-10-CM

## 2019-07-25 PROCEDURE — 97140 MANUAL THERAPY 1/> REGIONS: CPT | Performed by: PHYSICAL THERAPIST

## 2019-07-25 PROCEDURE — 97110 THERAPEUTIC EXERCISES: CPT | Performed by: PHYSICAL THERAPIST

## 2019-07-25 NOTE — THERAPY TREATMENT NOTE
Outpatient Physical Therapy Ortho Treatment Note   [place]     Patient Name: Flores Seaman  : 1955  MRN: [id number]  Today's Date: 2019      Visit Date: 2019    Visit Dx:    ICD-10-CM ICD-9-CM   1. Status post left rotator cuff repair Z98.890 V45.89       Patient Active Problem List   Diagnosis   • Injury of glenoid labrum   • Complete tear of left rotator cuff   • Arthritis of left acromioclavicular joint   • Biceps tendinitis of left upper extremity   • Medial epicondylitis of elbow, right        Past Medical History:   Diagnosis Date   • Asthma    • History of anemia    • History of methicillin resistant staphylococcus aureus (MRSA)     PATRICIA KAUR - MARISEL BLOOM MD TREATED   • Rotator cuff injury     LEFT        Past Surgical History:   Procedure Laterality Date   • APPENDECTOMY     • COLONOSCOPY     • CYST REMOVAL      FOREHEAD   • SHOULDER ARTHROSCOPY W/ ROTATOR CUFF REPAIR Left 2019    Procedure: LEFT SHOULDER ARTHROSCOPY WITH ROTATOR CUFF REPAIR, SUBACROMIAL DECOMPRESSION AND BICEPS TENOTOMY;  Surgeon: Jarett Walker MD;  Location: [facility] OR [facility] [place];  Service: Orthopedics   • TONSILLECTOMY         PT Ortho     Row Name 19 3480       Subjective Comments    Subjective Comments  Pt states erh shoulder is sore, but it is feeling better.  -      User Key  (r) = Recorded By, (t) = Taken By, (c) = Cosigned By    Initials Name Provider Type    Liam Godinez, PT Physical Therapist                      PT Assessment/Plan     Row Name 19 5157          PT Assessment    Assessment Comments  Pt is doing well with decreasing c/o pain and improving function.  -        PT Plan    PT Plan Comments  Pt is to continue her HEP daily.  -       User Key  (r) = Recorded By, (t) = Taken By, (c) = Cosigned By    Initials Name Provider Type    Liam Godinez, PT Physical Therapist          Modalities     Row Name 19 1703             Moist Heat    MH Applied  Yes   -GC      Location  (L) shoulder - pt seated w/ pillow for support  -GC      Rx Minutes  10 mins  -GC      MH Prior to Rx  Yes  -GC         Ice    Ice Applied  Yes  -GC      Location  left shoulder/medial upper arm w/ IFC - pt seated with pillow for support  -GC      Ice S/P Rx  Yes  -GC         ELECTRICAL STIMULATION    Attended/Unattended  Unattended  -GC      Stimulation Type  IFC  -GC      Location/Electrode Placement/Other  left shoulder/upper arm  - seated with CP  -GC       PT E-Stim Unattended (Manual) Minutes  15  -GC         Other Treatment Provided    Taping / Bracing  Trial of kinesiotape using fan strip for bruising/edema medial, upper arm.  Pt to leave tape up to 5 days and trim as needed.  -GC        User Key  (r) = Recorded By, (t) = Taken By, (c) = Cosigned By    Initials Name Provider Type    Liam Godinez, PT Physical Therapist        Exercises     Row Name 07/25/19 0935             Subjective Comments    Subjective Comments  Pt states erh shoulder is sore, but it is feeling better.  -GC         Exercise 1    Exercise Name 1  Pulley-FLEX  -GC      Cueing 1  Verbal;Demo  -GC      Time 1  5 min  -GC         Exercise 2    Exercise Name 2  Pulley-Scaption  -GC      Cueing 2  Verbal;Demo  -GC      Time 2  5 min  -GC         Exercise 3    Exercise Name 3  Cane stretch-FLEX  -GC      Cueing 3  Verbal;Demo  -GC      Reps 3  15  -GC         Exercise 4    Exercise Name 4  Cane stretch-ABD  -GC      Cueing 4  Verbal;Demo  -GC      Reps 4  15  -GC         Exercise 5    Exercise Name 5  Cane stretch-ER  -GC      Cueing 5  Verbal;Demo  -GC      Reps 5  15  -GC         Exercise 6    Exercise Name 6  shoulder IR vs theraband  -GC      Cueing 6  Verbal;Demo  -GC      Reps 6  25  -GC      Time 6  silver  -GC         Exercise 7    Exercise Name 7  shoulder ER vs therband  -GC      Cueing 7  Verbal;Demo  -GC      Reps 7  25  -GC      Time 7  silver  -GC         Exercise 8    Exercise Name 8  Shoulder rows vs  theraband  -GC      Cueing 8  Verbal;Demo  -GC      Reps 8  25  -GC      Time 8  silver  -GC         Exercise 9    Exercise Name 9  Shoulder Extension vs theraband  -GC      Cueing 9  Verbal;Demo  -GC      Reps 9  25  -GC      Time 9  silver  -GC         Exercise 10    Exercise Name 10  Scaption Up  -GC      Cueing 10  Verbal;Demo  -GC      Reps 10  25  -GC      Time 10  3#  -GC         Exercise 11    Exercise Name 11  Scaption down  -GC      Cueing 11  Verbal;Demo  -GC      Reps 11  25  -GC      Time 11  2#  -GC         Exercise 12    Exercise Name 12  Sidelying ER  -GC      Cueing 12  Verbal;Tactile  -GC      Reps 12  25  -GC      Time 12  3#  -GC         Exercise 13    Exercise Name 13  Prone Hor ABD 90  -GC      Cueing 13  Verbal;Tactile  -GC      Reps 13  25  -GC      Time 13  2#  -GC         Exercise 14    Exercise Name 14  Prone Hor   -GC      Cueing 14  Verbal;Tactile  -GC      Reps 14  25  -GC      Time 14  2#  -GC         Exercise 15    Exercise Name 15  Sleep Stretch  -GC      Cueing 15  Verbal;Tactile  -GC      Reps 15  10x10  -GC        User Key  (r) = Recorded By, (t) = Taken By, (c) = Cosigned By    Initials Name Provider Type     Liam Hesnon, PT Physical Therapist                                          Time Calculation:   Start Time: 0935  Stop Time: 1108  Time Calculation (min): 93 min  Therapy Charges for Today     Code Description Service Date Service Provider Modifiers Qty    83849669334  PT MANUAL THERAPY EA 15 MIN 7/25/2019 Liam Henson, PT GP 1    36218105144 HC PT THER PROC EA 15 MIN 7/25/2019 Liam Henson, PT GP 1                    Liam Henson PT  7/25/2019

## 2019-07-30 ENCOUNTER — HOSPITAL ENCOUNTER (OUTPATIENT)
Dept: PHYSICAL THERAPY | Facility: HOSPITAL | Age: 64
Setting detail: THERAPIES SERIES
Discharge: HOME | End: 2019-07-30

## 2019-07-30 DIAGNOSIS — Z98.890 STATUS POST LEFT ROTATOR CUFF REPAIR: Primary | ICD-10-CM

## 2019-07-30 PROCEDURE — 97140 MANUAL THERAPY 1/> REGIONS: CPT

## 2019-07-30 PROCEDURE — 97110 THERAPEUTIC EXERCISES: CPT

## 2019-07-30 NOTE — THERAPY TREATMENT NOTE
Outpatient Physical Therapy Ortho Treatment Note   [place]     Patient Name: Flores Seaman  : 1955  MRN: [id number]  Today's Date: 2019      Visit Date: 2019    Visit Dx:    ICD-10-CM ICD-9-CM   1. Status post left rotator cuff repair Z98.890 V45.89       Patient Active Problem List   Diagnosis   • Injury of glenoid labrum   • Complete tear of left rotator cuff   • Arthritis of left acromioclavicular joint   • Biceps tendinitis of left upper extremity   • Medial epicondylitis of elbow, right        Past Medical History:   Diagnosis Date   • Asthma    • History of anemia    • History of methicillin resistant staphylococcus aureus (MRSA)     PATRICIA BLOOM MD TREATED   • Rotator cuff injury     LEFT        Past Surgical History:   Procedure Laterality Date   • APPENDECTOMY     • COLONOSCOPY     • CYST REMOVAL      FOREHEAD   • SHOULDER ARTHROSCOPY W/ ROTATOR CUFF REPAIR Left 2019    Procedure: LEFT SHOULDER ARTHROSCOPY WITH ROTATOR CUFF REPAIR, SUBACROMIAL DECOMPRESSION AND BICEPS TENOTOMY;  Surgeon: Jarett Walker MD;  [place]: [facility] [place] [facility] [place]  Service: Orthopedics   • TONSILLECTOMY                         PT Assessment/Plan     Row Name 19 0935          PT Assessment    Assessment Comments  Pt tolerated progression of prescribed exercises well. Added IR towel stretch and bricep and tricep vs DB to pts HEP.   -KM        PT Plan    PT Plan Comments  Continue per POC  -KM       User Key  (r) = Recorded By, (t) = Taken By, (c) = Cosigned By    Initials Name Provider Type     Meena Rizo, NAHOMI Physical Therapy Assistant          Modalities     Row Name 19 2675             Subjective Comments    Subjective Comments  Pt states she had reached behind her back this morning and felt a sharp pull then some soreness posterior shoulder. However, she states her shoulder is doing well overall and continues to complete HEP  -KM         Moist Heat    MH Applied   Yes  -KM      Location  (L) shoulder - pt seated w/ pillow for support  -KM      Rx Minutes  10 mins  -KM      MH Prior to Rx  Yes  -KM         Ice    Ice Applied  Yes  -KM      Location  left shoulder/medial upper arm w/ IFC - pt seated with pillow for support  -KM      Ice S/P Rx  Yes  -KM         ELECTRICAL STIMULATION    Attended/Unattended  Unattended  -KM      Stimulation Type  IFC  -KM      Location/Electrode Placement/Other  left shoulder/upper arm  - seated with CP  -KM         Other Treatment Provided    Taping / Bracing  Trial of kinesiotape using fan strip for bruising/edema medial, upper arm.  Pt to leave tape up to 5 days and trim as needed.  -KM        User Key  (r) = Recorded By, (t) = Taken By, (c) = Cosigned By    Initials Name Provider Type    Meena Bernard, NAHOMI Physical Therapy Assistant        Exercises     Row Name 07/30/19 0935             Subjective Comments    Subjective Comments  Pt states she had reached behind her back this morning and felt a sharp pull then some soreness posterior shoulder. However, she states her shoulder is doing well overall and continues to complete HEP  -KM         Exercise 1    Exercise Name 1  Pulley-FLEX  -KM      Cueing 1  Verbal;Demo  -KM      Time 1  5 min  -KM         Exercise 2    Exercise Name 2  Pulley-Scaption  -KM      Cueing 2  Verbal;Demo  -KM      Time 2  5 min  -KM         Exercise 3    Exercise Name 3  Cane stretch-FLEX  -KM      Cueing 3  Verbal;Demo  -KM      Reps 3  15  -KM         Exercise 4    Exercise Name 4  Cane stretch-ABD  -KM      Cueing 4  Verbal;Demo  -KM      Reps 4  15  -KM         Exercise 5    Exercise Name 5  Cane stretch-ER  -KM      Cueing 5  Verbal;Demo  -KM      Reps 5  15  -KM         Exercise 6    Exercise Name 6  shoulder IR vs theraband  -KM      Cueing 6  Verbal;Demo  -KM      Reps 6  25  -KM      Time 6  silver  -KM         Exercise 7    Exercise Name 7  shoulder ER vs therband  -KM      Cueing 7  Verbal;Demo  -KM       Reps 7  25  -KM      Time 7  silver  -KM         Exercise 8    Exercise Name 8  Shoulder rows vs theraband  -KM      Cueing 8  Verbal;Demo  -KM      Reps 8  25  -KM      Time 8  gold  -KM         Exercise 9    Exercise Name 9  Shoulder Extension vs theraband  -KM      Cueing 9  Verbal;Demo  -KM      Reps 9  25  -KM      Time 9  gold  -KM         Exercise 10    Exercise Name 10  Scaption Up  -KM      Cueing 10  Verbal;Demo  -KM      Reps 10  25  -KM      Time 10  3#  -KM         Exercise 11    Exercise Name 11  Scaption down  -KM      Cueing 11  Verbal;Demo  -KM      Reps 11  25  -KM      Time 11  3#  -KM         Exercise 12    Exercise Name 12  Sidelying ER  -KM      Cueing 12  Verbal;Tactile  -KM      Reps 12  25  -KM      Time 12  3#  -KM         Exercise 13    Exercise Name 13  Prone Hor ABD 90  -KM      Cueing 13  Verbal;Tactile  -KM      Reps 13  25  -KM      Time 13  3#  -KM         Exercise 14    Exercise Name 14  Prone Hor   -KM      Cueing 14  Verbal;Tactile  -KM      Reps 14  25  -KM      Time 14  2#  -KM         Exercise 15    Exercise Name 15  Sleep Stretch  -KM      Cueing 15  Verbal;Tactile  -KM      Reps 15  10x10  -KM        User Key  (r) = Recorded By, (t) = Taken By, (c) = Cosigned By    Initials Name Provider Type    Meena Bernard PTA Physical Therapy Assistant                      Manual Rx (last 36 hours)      Manual Treatments     Row Name 07/30/19 0935             Manual Rx 1    Manual Rx 1 Location  left shoulder  -KM      Manual Rx 1 Type  PROM into all planes 10-15 reps each  -KM      Manual Rx 1 Duration  15 min  -KM        User Key  (r) = Recorded By, (t) = Taken By, (c) = Cosigned By    Initials Name Provider Type    Meena Bernard PTA Physical Therapy Assistant                             Time Calculation:   Start Time: 0935  Stop Time: 1100  Time Calculation (min): 85 min  Therapy Charges for Today     Code Description Service Date Service Provider Modifiers Qty     73697954107  PT MANUAL THERAPY EA 15 MIN 7/30/2019 Meena Rizo, PTA GP 1    [id number]  PT THER PROC EA 15 MIN 7/30/2019 Meena Rizo, PTA GP 1                    Meena Rizo, NAHOMI  7/30/2019

## 2019-08-02 ENCOUNTER — HOSPITAL ENCOUNTER (OUTPATIENT)
Dept: PHYSICAL THERAPY | Facility: HOSPITAL | Age: 64
Setting detail: THERAPIES SERIES
Discharge: HOME | End: 2019-08-02

## 2019-08-02 DIAGNOSIS — Z98.890 STATUS POST LEFT ROTATOR CUFF REPAIR: Primary | ICD-10-CM

## 2019-08-02 PROCEDURE — 97110 THERAPEUTIC EXERCISES: CPT

## 2019-08-02 PROCEDURE — 97140 MANUAL THERAPY 1/> REGIONS: CPT

## 2019-08-02 PROCEDURE — G0283 ELEC STIM OTHER THAN WOUND: HCPCS

## 2019-08-02 NOTE — THERAPY TREATMENT NOTE
Outpatient Physical Therapy Ortho Treatment Note   [place]     Patient Name: Flores Seaman  : 1955  MRN: [id number]  Today's Date: 2019      Visit Date: 2019    Visit Dx:    ICD-10-CM ICD-9-CM   1. Status post left rotator cuff repair Z98.890 V45.89       Patient Active Problem List   Diagnosis   • Injury of glenoid labrum   • Complete tear of left rotator cuff   • Arthritis of left acromioclavicular joint   • Biceps tendinitis of left upper extremity   • Medial epicondylitis of elbow, right        Past Medical History:   Diagnosis Date   • Asthma    • History of anemia    • History of methicillin resistant staphylococcus aureus (MRSA)     PATRICIA KAUR - MARISEL BLOOM MD TREATED   • Rotator cuff injury     LEFT        Past Surgical History:   Procedure Laterality Date   • APPENDECTOMY     • COLONOSCOPY     • CYST REMOVAL      FOREHEAD   • SHOULDER ARTHROSCOPY W/ ROTATOR CUFF REPAIR Left 2019    Procedure: LEFT SHOULDER ARTHROSCOPY WITH ROTATOR CUFF REPAIR, SUBACROMIAL DECOMPRESSION AND BICEPS TENOTOMY;  Surgeon: Jarett Walker MD;  Location: [facility] OR [facility] [place];  Service: Orthopedics   • TONSILLECTOMY                         PT Assessment/Plan     Row Name 19 1200          PT Assessment    Assessment Comments  Pt continues to progress well with PROM in all directions with decreased pain. Additional repetitions completed with improved tolerance. Explained to pt that she may experience increased soreness with the more she uses her shoulder. Fatigue reported at completion of session.   -KM        PT Plan    PT Plan Comments  Pt to complete HEP. Progress as tolerated  -KM       User Key  (r) = Recorded By, (t) = Taken By, (c) = Cosigned By    Initials Name Provider Type    Meena Bernard PTA Physical Therapy Assistant          Modalities     Row Name 19 0945             Moist Heat    MH Applied  Yes  -KM      Location  (L) shoulder - pt seated w/ pillow for support  -KM       Rx Minutes  10 mins  -KM      MH Prior to Rx  Yes  -KM         Ice    Ice Applied  Yes  -KM      Location  left shoulder/medial upper arm w/ IFC - pt seated with pillow for support  -KM      Ice S/P Rx  Yes  -KM         ELECTRICAL STIMULATION    Attended/Unattended  Unattended  -KM      Stimulation Type  IFC  -KM      Location/Electrode Placement/Other  left shoulder/upper arm  - seated with CP  -KM         Other Treatment Provided    Taping / Bracing  Trial of kinesiotape using fan strip for bruising/edema medial, upper arm.  Pt to leave tape up to 5 days and trim as needed.  -KM        User Key  (r) = Recorded By, (t) = Taken By, (c) = Cosigned By    Initials Name Provider Type    Meena Bernard, NAHOMI Physical Therapy Assistant        Exercises     Row Name 08/02/19 0945             Subjective Comments    Subjective Comments  Pt states she is experiencing soreness and minor muscle spasms anterior and posterior shoulder. She states she has been completing increased daily activities, going on longer walks, and completing HEP daily. She states she feels she is not ready to return to work due to increased shoulder fatigue during and after activity.   -KM         Exercise 1    Exercise Name 1  Pulley-FLEX  -KM      Cueing 1  Verbal;Demo  -KM      Time 1  5 min  -KM         Exercise 2    Exercise Name 2  Pulley-Scaption  -KM      Cueing 2  Verbal;Demo  -KM      Time 2  5 min  -KM         Exercise 3    Exercise Name 3  Cane stretch-FLEX  -KM      Cueing 3  Verbal;Demo  -KM      Reps 3  15  -KM         Exercise 4    Exercise Name 4  Cane stretch-ABD  -KM      Cueing 4  Verbal;Demo  -KM      Reps 4  15  -KM         Exercise 5    Exercise Name 5  Cane stretch-ER  -KM      Cueing 5  Verbal;Demo  -KM      Reps 5  15  -KM         Exercise 6    Exercise Name 6  shoulder IR vs theraband  -KM      Cueing 6  Verbal;Demo  -KM      Reps 6  30  -KM      Time 6  silver  -KM         Exercise 7    Exercise Name 7  shoulder ER vs  therband  -KM      Cueing 7  Verbal;Demo  -KM      Reps 7  30  -KM      Time 7  silver  -KM         Exercise 8    Exercise Name 8  Shoulder rows vs theraband  -KM      Cueing 8  Verbal;Demo  -KM      Reps 8  30  -KM      Time 8  gold  -KM         Exercise 9    Exercise Name 9  Shoulder Extension vs theraband  -KM      Cueing 9  Verbal;Demo  -KM      Reps 9  30  -KM      Time 9  gold  -KM         Exercise 10    Exercise Name 10  Scaption Up  -KM      Cueing 10  Verbal;Demo  -KM      Reps 10  30  -KM      Time 10  3#  -KM         Exercise 11    Exercise Name 11  Scaption down  -KM      Cueing 11  Verbal;Demo  -KM      Reps 11  30  -KM      Time 11  3#  -KM         Exercise 12    Exercise Name 12  Sidelying ER  -KM      Cueing 12  Verbal;Tactile  -KM      Reps 12  30  -KM      Time 12  3#  -KM         Exercise 13    Exercise Name 13  Prone Hor ABD 90  -KM      Cueing 13  Verbal;Tactile  -KM      Sets 13  30  -KM      Reps 13  25  -KM      Time 13  3#  -KM         Exercise 14    Exercise Name 14  Prone Hor   -KM      Cueing 14  Verbal;Tactile  -KM      Reps 14  30  -KM      Time 14  2#  -KM         Exercise 15    Exercise Name 15  Sleep Stretch  -KM      Cueing 15  Verbal;Tactile  -KM      Reps 15  10x10  -KM        User Key  (r) = Recorded By, (t) = Taken By, (c) = Cosigned By    Initials Name Provider Type    Meena Bernard PTA Physical Therapy Assistant                      Manual Rx (last 36 hours)      Manual Treatments     Row Name 08/02/19 0945             Manual Rx 1    Manual Rx 1 Location  left shoulder  -KM      Manual Rx 1 Type  PROM into all planes 10-15 reps each  -KM      Manual Rx 1 Duration  15 min  -KM        User Key  (r) = Recorded By, (t) = Taken By, (c) = Cosigned By    Initials Name Provider Type    Meena Bernard PTA Physical Therapy Assistant                             Time Calculation:   Start Time: 0945  Stop Time: 1115  Time Calculation (min): 90 min  Therapy Charges for Today      Code Description Service Date Service Provider Modifiers Qty    22034925640 HC PT MANUAL THERAPY EA 15 MIN 8/2/2019 Meena Rizo, PTA GP 1    68907718981 HC PT THER PROC EA 15 MIN 8/2/2019 Meena Rizo, PTA GP 1    41276729298 HC PT ELECTRICAL STIM UNATTENDED 8/2/2019 Meena Rizo, PTA  1                    Meena Rizo PTA  8/2/2019

## 2019-08-05 ENCOUNTER — HOSPITAL ENCOUNTER (OUTPATIENT)
Dept: PHYSICAL THERAPY | Facility: HOSPITAL | Age: 64
Setting detail: THERAPIES SERIES
Discharge: HOME | End: 2019-08-05

## 2019-08-05 DIAGNOSIS — Z98.890 STATUS POST LEFT ROTATOR CUFF REPAIR: Primary | ICD-10-CM

## 2019-08-05 PROCEDURE — 97110 THERAPEUTIC EXERCISES: CPT

## 2019-08-05 PROCEDURE — 97140 MANUAL THERAPY 1/> REGIONS: CPT

## 2019-08-05 NOTE — THERAPY TREATMENT NOTE
Outpatient Physical Therapy Ortho Treatment Note   [facility]     Patient Name: Flores Seaman  : 1955  MRN: [id number]  Today's Date: 2019      Visit Date: 2019    Visit Dx:    ICD-10-CM ICD-9-CM   1. Status post left rotator cuff repair Z98.890 V45.89       Patient Active Problem List   Diagnosis   • Injury of glenoid labrum   • Complete tear of left rotator cuff   • Arthritis of left acromioclavicular joint   • Biceps tendinitis of left upper extremity   • Medial epicondylitis of elbow, right        Past Medical History:   Diagnosis Date   • Asthma    • History of anemia    • History of methicillin resistant staphylococcus aureus (MRSA)     PATRICIA KAUR - MARISEL BLOOM MD TREATED   • Rotator cuff injury     LEFT        Past Surgical History:   Procedure Laterality Date   • APPENDECTOMY     • COLONOSCOPY     • CYST REMOVAL      FOREHEAD   • SHOULDER ARTHROSCOPY W/ ROTATOR CUFF REPAIR Left 2019    Procedure: LEFT SHOULDER ARTHROSCOPY WITH ROTATOR CUFF REPAIR, SUBACROMIAL DECOMPRESSION AND BICEPS TENOTOMY;  Surgeon: Jarett Walker MD;  Location: [facility] OR [facility] [place];  Service: Orthopedics   • TONSILLECTOMY                         PT Assessment/Plan     Row Name 19 0925          PT Assessment    Assessment Comments  Pt demonstrates improved strength and endurance with prescribed exercises. Additional repetitions completed. PROM continues to progress well with tightness in IR.  -KM        PT Plan    PT Plan Comments  continue per POC  -KM       User Key  (r) = Recorded By, (t) = Taken By, (c) = Cosigned By    Initials Name Provider Type     Menea Rizo PTA Physical Therapy Assistant          Modalities     Row Name 19 6721             Subjective Comments    Subjective Comments  Pt states she completed increased activity over the weekend including yard work without increased pain or soreness. States she incorporated biceps and triceps into HEP without complications.   -KM          Moist Heat    MH Applied  Yes  -KM      Location  (L) shoulder - pt seated w/ pillow for support  -KM      Rx Minutes  10 mins  -KM      MH Prior to Rx  Yes  -KM         Ice    Ice Applied  Yes  -KM      Location  left shoulder/medial upper arm w/ IFC - pt seated with pillow for support  -KM      Ice S/P Rx  Yes  -KM         ELECTRICAL STIMULATION    Attended/Unattended  Unattended  -KM      Stimulation Type  IFC  -KM      Location/Electrode Placement/Other  left shoulder/upper arm  - seated with CP  -KM         Other Treatment Provided    Taping / Bracing  Trial of kinesiotape using fan strip for bruising/edema medial, upper arm.  Pt to leave tape up to 5 days and trim as needed.  -KM        User Key  (r) = Recorded By, (t) = Taken By, (c) = Cosigned By    Initials Name Provider Type    Meena Bernard, NAHOMI Physical Therapy Assistant        Exercises     Row Name 08/05/19 0935             Subjective Comments    Subjective Comments  Pt states she completed increased activity over the weekend including yard work without increased pain or soreness. States she incorporated biceps and triceps into HEP without complications.   -KM         Exercise 1    Exercise Name 1  Pulley-FLEX  -KM      Cueing 1  Verbal;Demo  -KM      Time 1  5 min  -KM         Exercise 2    Exercise Name 2  Pulley-Scaption  -KM      Cueing 2  Verbal;Demo  -KM      Time 2  5 min  -KM         Exercise 3    Exercise Name 3  Cane stretch-FLEX  -KM      Cueing 3  Verbal;Demo  -KM      Reps 3  15  -KM         Exercise 4    Exercise Name 4  Cane stretch-ABD  -KM      Cueing 4  Verbal;Demo  -KM      Reps 4  15  -KM         Exercise 5    Exercise Name 5  Cane stretch-ER  -KM      Cueing 5  Verbal;Demo  -KM      Reps 5  15  -KM         Exercise 6    Exercise Name 6  shoulder IR vs theraband  -KM      Cueing 6  Verbal;Demo  -KM      Reps 6  40  -KM      Time 6  silver  -KM         Exercise 7    Exercise Name 7  shoulder ER vs therband  -KM      Cueing  7  Verbal;Demo  -KM      Reps 7  40  -KM      Time 7  silver  -KM         Exercise 8    Exercise Name 8  Shoulder rows vs theraband  -KM      Cueing 8  Verbal;Demo  -KM      Reps 8  40  -KM      Time 8  gold  -KM         Exercise 9    Exercise Name 9  Shoulder Extension vs theraband  -KM      Cueing 9  Verbal;Demo  -KM      Reps 9  40  -KM      Time 9  gold  -KM         Exercise 10    Exercise Name 10  Scaption Up  -KM      Cueing 10  Verbal;Demo  -KM      Reps 10  30  -KM      Time 10  3#  -KM         Exercise 11    Exercise Name 11  Scaption down  -KM      Cueing 11  Verbal;Demo  -KM      Reps 11  30  -KM      Time 11  2#  -KM         Exercise 12    Exercise Name 12  Sidelying ER  -KM      Cueing 12  Verbal;Tactile  -KM      Reps 12  30  -KM      Time 12  3#  -KM         Exercise 13    Exercise Name 13  Prone Hor ABD 90  -KM      Cueing 13  Verbal;Tactile  -KM      Sets 13  30  -KM      Reps 13  25  -KM      Time 13  3#  -KM         Exercise 14    Exercise Name 14  Prone Hor   -KM      Cueing 14  Verbal;Tactile  -KM      Reps 14  30  -KM      Time 14  3#  -KM         Exercise 15    Exercise Name 15  Sleep Stretch  -KM      Cueing 15  Verbal;Tactile  -KM      Reps 15  10x10  -KM        User Key  (r) = Recorded By, (t) = Taken By, (c) = Cosigned By    Initials Name Provider Type    Meena Bernard PTA Physical Therapy Assistant                      Manual Rx (last 36 hours)      Manual Treatments     Row Name 08/05/19 0935             Manual Rx 1    Manual Rx 1 Location  left shoulder  -KM      Manual Rx 1 Type  PROM into all planes 10-15 reps each  -KM      Manual Rx 1 Duration  15 min  -KM        User Key  (r) = Recorded By, (t) = Taken By, (c) = Cosigned By    Initials Name Provider Type    Meena Bernard PTA Physical Therapy Assistant                             Time Calculation:   Start Time: 0935  Stop Time: 1110  Time Calculation (min): 95 min  Therapy Charges for Today     Code Description  Service Date Service Provider Modifiers Qty    60370853213 HC PT MANUAL THERAPY EA 15 MIN 8/5/2019 Meena Rizo, PTA GP 1    17743731771 HC PT THER PROC EA 15 MIN 8/5/2019 Meena Rizo PTA GP 1                    Meena Rizo, NAHOMI  8/5/2019

## 2019-08-08 ENCOUNTER — HOSPITAL ENCOUNTER (OUTPATIENT)
Dept: PHYSICAL THERAPY | Facility: HOSPITAL | Age: 64
Setting detail: THERAPIES SERIES
Discharge: HOME | End: 2019-08-08

## 2019-08-08 DIAGNOSIS — Z98.890 STATUS POST LEFT ROTATOR CUFF REPAIR: Primary | ICD-10-CM

## 2019-08-08 PROCEDURE — 97140 MANUAL THERAPY 1/> REGIONS: CPT

## 2019-08-08 PROCEDURE — G0283 ELEC STIM OTHER THAN WOUND: HCPCS

## 2019-08-08 PROCEDURE — 97110 THERAPEUTIC EXERCISES: CPT

## 2019-08-08 NOTE — THERAPY TREATMENT NOTE
Outpatient Physical Therapy Ortho Treatment Note   [facility]     Patient Name: Flores Seaman  : 1955  MRN: [id number]  Today's Date: 2019      Visit Date: 2019    Visit Dx:    ICD-10-CM ICD-9-CM   1. Status post left rotator cuff repair Z98.890 V45.89       Patient Active Problem List   Diagnosis   • Injury of glenoid labrum   • Complete tear of left rotator cuff   • Arthritis of left acromioclavicular joint   • Biceps tendinitis of left upper extremity   • Medial epicondylitis of elbow, right        Past Medical History:   Diagnosis Date   • Asthma    • History of anemia    • History of methicillin resistant staphylococcus aureus (MRSA)     PATRICIA BLOOM MD TREATED   • Rotator cuff injury     LEFT        Past Surgical History:   Procedure Laterality Date   • APPENDECTOMY     • COLONOSCOPY     • CYST REMOVAL      FOREHEAD   • SHOULDER ARTHROSCOPY W/ ROTATOR CUFF REPAIR Left 2019    Procedure: LEFT SHOULDER ARTHROSCOPY WITH ROTATOR CUFF REPAIR, SUBACROMIAL DECOMPRESSION AND BICEPS TENOTOMY;  Surgeon: Jarett Walker MD;  Location: [facility] OR [facility] [place];  Service: Orthopedics   • TONSILLECTOMY         PT Ortho     Row Name 19 0940       Left Upper Ext    Lt Shoulder Abduction AROM  180  -KM    Lt Shoulder Abduction PROM  180  -KM    Lt Shoulder Flexion AROM  155  -KM    Lt Shoulder Flexion PROM  160  -KM    Lt Shoulder External Rotation PROM  63  -KM    Lt Shoulder Internal Rotation PROM  75  -KM      User Key  (r) = Recorded By, (t) = Taken By, (c) = Cosigned By    Initials Name Provider Type    Meena Bernard PTA Physical Therapy Assistant                      PT Assessment/Plan     Row Name 19 0941          PT Assessment    Assessment Comments  Pt continues to make progress toward goals. Pt demonstrates improved passive and active ROM , improved overall functional ability with reports of decreased pain. Pt states she is hesitant to return to work due to  level of strength.   -KM        PT Plan    PT Plan Comments  Continue POC per MD  -KM       User Key  (r) = Recorded By, (t) = Taken By, (c) = Cosigned By    Initials Name Provider Type    Meena Bernard PTA Physical Therapy Assistant          Modalities     Row Name 08/08/19 0940             Moist Heat    MH Applied  Yes  -KM      Location  (L) shoulder - pt seated w/ pillow for support  -KM      Rx Minutes  10 mins  -KM      MH Prior to Rx  Yes  -KM         Ice    Ice Applied  Yes  -KM      Location  left shoulder/medial upper arm w/ IFC - pt seated with pillow for support  -KM      Ice S/P Rx  Yes  -KM         ELECTRICAL STIMULATION    Attended/Unattended  Unattended  -KM      Stimulation Type  IFC  -KM      Location/Electrode Placement/Other  left shoulder/upper arm  - seated with CP  -KM         Other Treatment Provided    Taping / Bracing  Trial of kinesiotape using fan strip for bruising/edema medial, upper arm.  Pt to leave tape up to 5 days and trim as needed.  -KM        User Key  (r) = Recorded By, (t) = Taken By, (c) = Cosigned By    Initials Name Provider Type    Meena Bernard PTA Physical Therapy Assistant        Exercises     Row Name 08/08/19 0940             Subjective Comments    Subjective Comments  Pt states she experienced soreness after previous treatment session possibly due to the additional repetitions. Pt states she has tightness and soreness in her UT up into her neck. She states she continues to complete HEP daily and has f/u appointment Monday.   -KM         Exercise 1    Exercise Name 1  Pulley-FLEX  -KM      Cueing 1  Verbal;Demo  -KM      Time 1  5 min  -KM         Exercise 2    Exercise Name 2  Pulley-Scaption  -KM      Cueing 2  Verbal;Demo  -KM      Time 2  5 min  -KM         Exercise 3    Exercise Name 3  Cane stretch-FLEX  -KM      Cueing 3  Verbal;Demo  -KM      Reps 3  15  -KM         Exercise 4    Exercise Name 4  Cane stretch-ABD  -KM      Cueing 4  Verbal;Demo  -KM       Reps 4  15  -KM         Exercise 5    Exercise Name 5  Cane stretch-ER  -KM      Cueing 5  Verbal;Demo  -KM      Reps 5  15  -KM         Exercise 6    Exercise Name 6  shoulder IR vs theraband  -KM      Cueing 6  Verbal;Demo  -KM      Reps 6  40  -KM      Time 6  silver  -KM         Exercise 7    Exercise Name 7  shoulder ER vs therband  -KM      Cueing 7  Verbal;Demo  -KM      Reps 7  40  -KM      Time 7  silver  -KM         Exercise 8    Exercise Name 8  Shoulder rows vs theraband  -KM      Cueing 8  Verbal;Demo  -KM      Reps 8  40  -KM      Time 8  gold  -KM         Exercise 9    Exercise Name 9  Shoulder Extension vs theraband  -KM      Cueing 9  Verbal;Demo  -KM      Reps 9  40  -KM      Time 9  gold  -KM         Exercise 10    Exercise Name 10  Scaption Up  -KM      Cueing 10  Verbal;Demo  -KM      Reps 10  30  -KM      Time 10  3#  -KM         Exercise 11    Exercise Name 11  Scaption down  -KM      Cueing 11  Verbal;Demo  -KM      Reps 11  30  -KM      Time 11  2#  -KM         Exercise 12    Exercise Name 12  Sidelying ER  -KM      Cueing 12  Verbal;Tactile  -KM      Reps 12  30  -KM      Time 12  3#  -KM         Exercise 13    Exercise Name 13  Prone Hor ABD 90  -KM      Cueing 13  Verbal;Tactile  -KM      Sets 13  30  -KM      Reps 13  25  -KM      Time 13  3#  -KM         Exercise 14    Exercise Name 14  Prone Hor   -KM      Cueing 14  Verbal;Tactile  -KM      Reps 14  30  -KM      Time 14  3#  -KM         Exercise 15    Exercise Name 15  Sleep Stretch  -KM      Cueing 15  Verbal;Tactile  -KM      Reps 15  10x10  -KM        User Key  (r) = Recorded By, (t) = Taken By, (c) = Cosigned By    Initials Name Provider Type    Meena Bernard PTA Physical Therapy Assistant                      Manual Rx (last 36 hours)      Manual Treatments     Row Name 08/08/19 0940             Manual Rx 1    Manual Rx 1 Location  left shoulder  -KM      Manual Rx 1 Type  PROM into all planes 10-15 reps each  -KM       Manual Rx 1 Duration  15 min  -LARRY        User Key  (r) = Recorded By, (t) = Taken By, (c) = Cosigned By    Initials Name Provider Type    Meena Bernard PTA Physical Therapy Assistant                             Time Calculation:   Start Time: 0940  Stop Time: 1112  Time Calculation (min): 92 min  Therapy Charges for Today     Code Description Service Date Service Provider Modifiers Qty    06523799556 HC PT MANUAL THERAPY EA 15 MIN 8/8/2019 Meena Rizo, NAHOMI GP 1    86772354264 HC PT THER PROC EA 15 MIN 8/8/2019 Meena Rizo, NAHOMI GP 1    33478231122 HC PT ELECTRICAL STIM UNATTENDED 8/8/2019 Meena Rizo PTA  1                    Meena Rizo PTA  8/8/2019

## 2019-08-12 ENCOUNTER — HOSPITAL ENCOUNTER (OUTPATIENT)
Dept: PHYSICAL THERAPY | Facility: HOSPITAL | Age: 64
Setting detail: THERAPIES SERIES
Discharge: HOME | End: 2019-08-12

## 2019-08-12 DIAGNOSIS — Z98.890 STATUS POST LEFT ROTATOR CUFF REPAIR: Primary | ICD-10-CM

## 2019-08-12 PROCEDURE — G0283 ELEC STIM OTHER THAN WOUND: HCPCS

## 2019-08-12 PROCEDURE — 97110 THERAPEUTIC EXERCISES: CPT

## 2019-08-12 PROCEDURE — 97140 MANUAL THERAPY 1/> REGIONS: CPT

## 2019-08-12 NOTE — THERAPY TREATMENT NOTE
Outpatient Physical Therapy Ortho Treatment Note   [place]     Patient Name: Flores Seaman  : 1955  MRN: [id number]  Today's Date: 2019      Visit Date: 2019    Visit Dx:    ICD-10-CM ICD-9-CM   1. Status post left rotator cuff repair Z98.890 V45.89       Patient Active Problem List   Diagnosis   • Injury of glenoid labrum   • Complete tear of left rotator cuff   • Arthritis of left acromioclavicular joint   • Biceps tendinitis of left upper extremity   • Medial epicondylitis of elbow, right        Past Medical History:   Diagnosis Date   • Asthma    • History of anemia    • History of methicillin resistant staphylococcus aureus (MRSA)     PATRICIA KAUR - MARISEL BLOOM MD TREATED   • Rotator cuff injury     LEFT        Past Surgical History:   Procedure Laterality Date   • APPENDECTOMY     • COLONOSCOPY     • CYST REMOVAL      FOREHEAD   • SHOULDER ARTHROSCOPY W/ ROTATOR CUFF REPAIR Left 2019    Procedure: LEFT SHOULDER ARTHROSCOPY WITH ROTATOR CUFF REPAIR, SUBACROMIAL DECOMPRESSION AND BICEPS TENOTOMY;  Surgeon: Jarett Walker MD;  Location: [facility] OR [facility] [place];  Service: Orthopedics   • TONSILLECTOMY         PT Ortho     Row Name 19 0897       Subjective Comments    Subjective Comments  reports she is a little sore this am.  states she is sleeping better since surgery- reports she usually has some soreness after taking a shower and stretching and then usually feels better.  Has to go to MD today and MD called and asked her to come earlier so limited session today  -KM       Subjective Pain    Able to rate subjective pain?  yes  -KM    Pre-Treatment Pain Level  -- 2-3  -KM    Subjective Pain Comment  Reports soreness up and down but improving  -KM      User Key  (r) = Recorded By, (t) = Taken By, (c) = Cosigned By    Initials Name Provider Type    Siomara Benítez PTA Physical Therapy Assistant                      PT Assessment/Plan     Row Name 19 6314          PT  Assessment    Functional Limitations  Performance in leisure activities;Performance in work activities;Limitations in community activities  -KM     Impairments  Impaired flexibility;Muscle strength;Pain;Range of motion  -KM     Assessment Comments  -- pt reports she is improving- 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  -KM       User Key  (r) = Recorded By, (t) = Taken By, (c) = Cosigned By    Initials Name Provider Type    Siomara Benítez PTA Physical Therapy Assistant          Modalities     Row Name 08/12/19 0905             Moist Heat    MH Applied  Yes  -KM      Location  (L) shoulder - pt seated w/ pillow for support  -KM      Rx Minutes  10 mins  -KM      MH Prior to Rx  Yes  -KM         Ice    Ice Applied  Yes  -KM      Location  left shoulder/medial upper arm w/ IFC - pt seated with pillow for support  -KM      Ice S/P Rx  Yes  -KM         ELECTRICAL STIMULATION    Attended/Unattended  Unattended  -KM      Stimulation Type  IFC  -KM      Location/Electrode Placement/Other  left shoulder/upper arm  - seated with CP  -KM         Other Treatment Provided    Taping / Bracing          User Key  (r) = Recorded By, (t) = Taken By, (c) = Cosigned By    Initials Name Provider Type    Siomara Benítez PTA Physical Therapy Assistant        Exercises     Row Name 08/12/19 1141 08/12/19 0905          Subjective Comments    Subjective Comments  --  reports she is a little sore this am.  states she is sleeping better since surgery- reports she usually has some soreness after taking a shower and stretching and then usually feels better.  Has to go to MD today and MD called and asked her to come earlier so limited session today  -KM        Subjective Pain    Able to rate subjective pain?  --  yes  -KM     Pre-Treatment Pain Level  --  -- 2-3  -KM     Subjective Pain Comment  --  Reports soreness up and down but improving  -KM         Total Minutes    07770 - PT Manual Therapy Minutes  15  -KM  --        Exercise 1    Exercise Name 1  --  Pulley-FLEX  -KM     Cueing 1  --  Verbal;Demo  -KM     Time 1  --  5 min  -KM        Exercise 2    Exercise Name 2  --  Pulley-Scaption  -KM     Cueing 2  --  Verbal;Demo  -KM     Time 2  --  not today- had to go to MD  -KM        Exercise 3    Exercise Name 3  --  Cane stretch-FLEX  -KM     Cueing 3  --  Verbal;Demo  -KM     Reps 3  --  15  -KM        Exercise 4    Exercise Name 4  --  Cane stretch-ABD  -KM     Cueing 4  --  Verbal;Demo  -KM     Reps 4  --  15  -KM        Exercise 5    Exercise Name 5  --  Cane stretch-ER  -KM     Cueing 5  --  Verbal;Demo  -KM     Reps 5  --  15  -KM        Exercise 6    Exercise Name 6  --  shoulder IR vs theraband  -KM     Cueing 6  --  Verbal;Demo  -KM     Reps 6  --  40  -KM     Time 6  --  silver  -KM        Exercise 7    Exercise Name 7  --  shoulder ER vs therband  -KM     Cueing 7  --  Verbal;Demo  -KM     Reps 7  --  40  -KM     Time 7  --  silver  -KM        Exercise 8    Exercise Name 8  --  Shoulder rows vs theraband  -KM     Cueing 8  --  Verbal;Demo  -KM     Reps 8  --  40  -KM     Time 8  --  gold  -KM        Exercise 9    Exercise Name 9  --  Shoulder Extension vs theraband  -KM     Cueing 9  --  Verbal;Demo  -KM     Reps 9  --  40  -KM     Time 9  --  gold  -KM        Exercise 10    Exercise Name 10  --  Scaption Up  -KM     Cueing 10  --  Verbal;Demo  -KM     Reps 10  --  35  -KM     Time 10  --  3#  -KM        Exercise 11    Exercise Name 11  --  Scaption down  -KM     Cueing 11  --  Verbal;Demo  -KM     Reps 11  --  35  -KM     Time 11  --  3#  -KM        Exercise 12    Exercise Name 12  --  Sidelying ER  -KM     Cueing 12  --  Verbal;Tactile  -KM     Reps 12  --  35  -KM     Time 12  --  3#  -KM        Exercise 13    Exercise Name 13  --  Prone Hor ABD 90  -KM     Cueing 13  --  Verbal;Tactile  -KM     Reps 13  --  35  -KM     Time 13  --  3#  -KM         Exercise 14    Exercise Name 14  --  Prone Hor   -KM     Cueing 14  --  Verbal;Tactile  -KM     Reps 14  --  35  -KM     Time 14  --  3#  -KM        Exercise 15    Exercise Name 15  --  Sleep Stretch  -KM     Cueing 15  --  Verbal;Tactile  -KM     Reps 15  --  10x10  -KM       User Key  (r) = Recorded By, (t) = Taken By, (c) = Cosigned By    Initials Name Provider Type    Siomara Benítez PTA Physical Therapy Assistant                      Manual Rx (last 36 hours)      Manual Treatments     Row Name 08/12/19 1141 08/12/19 0905          Total Minutes    73272 - PT Manual Therapy Minutes  15  -KM  --        Manual Rx 1    Manual Rx 1 Location  --  left shoulder  -KM     Manual Rx 1 Type  --  PROM into all planes 10-15 reps each  -KM     Manual Rx 1 Duration  --  15 min  -KM       User Key  (r) = Recorded By, (t) = Taken By, (c) = Cosigned By    Initials Name Provider Type    Siomara Benítez PTA Physical Therapy Assistant              Therapy Education  Education Details: (encouraged pt to continue HEP.  )  Given: HEP, Symptoms/condition management  Program: Reinforced  How Provided: Verbal, Demonstration  Provided to: Patient  Level of Understanding: Teach back education performed, Verbalized, Demonstrated              Time Calculation:   Start Time: 0905  Stop Time: 1029  Time Calculation (min): 84 min  Therapy Charges for Today     Code Description Service Date Service Provider Modifiers Qty    55875734498 HC PT MANUAL THERAPY EA 15 MIN 8/12/2019 Siomara Alexander PTA GP 1    14766858468 HC PT ELECTRICAL STIM UNATTENDED 8/12/2019 Siomara Alexander PTA  1    74828631810 HC PT THER PROC EA 15 MIN 8/12/2019 Siomara Alexander PTA GP 1                    Siomara Alexander PTA  8/12/2019

## 2019-08-15 ENCOUNTER — HOSPITAL ENCOUNTER (OUTPATIENT)
Dept: PHYSICAL THERAPY | Facility: HOSPITAL | Age: 64
Setting detail: THERAPIES SERIES
Discharge: HOME | End: 2019-08-15

## 2019-08-15 DIAGNOSIS — Z98.890 STATUS POST LEFT ROTATOR CUFF REPAIR: Primary | ICD-10-CM

## 2019-08-15 PROCEDURE — 97110 THERAPEUTIC EXERCISES: CPT

## 2019-08-15 PROCEDURE — 97140 MANUAL THERAPY 1/> REGIONS: CPT

## 2019-08-15 NOTE — THERAPY TREATMENT NOTE
Outpatient Physical Therapy Ortho Treatment Note   [place]     Patient Name: Flores Seaman  : 1955  MRN: [id number]  Today's Date: 8/15/2019      Visit Date: 08/15/2019    Visit Dx:    ICD-10-CM ICD-9-CM   1. Status post left rotator cuff repair Z98.890 V45.89       Patient Active Problem List   Diagnosis   • Injury of glenoid labrum   • Complete tear of left rotator cuff   • Arthritis of left acromioclavicular joint   • Biceps tendinitis of left upper extremity   • Medial epicondylitis of elbow, right        Past Medical History:   Diagnosis Date   • Asthma    • History of anemia    • History of methicillin resistant staphylococcus aureus (MRSA)     PATRICIA KAUR - MARISEL BLOOM MD TREATED   • Rotator cuff injury     LEFT        Past Surgical History:   Procedure Laterality Date   • APPENDECTOMY     • COLONOSCOPY     • CYST REMOVAL      FOREHEAD   • SHOULDER ARTHROSCOPY W/ ROTATOR CUFF REPAIR Left 2019    Procedure: LEFT SHOULDER ARTHROSCOPY WITH ROTATOR CUFF REPAIR, SUBACROMIAL DECOMPRESSION AND BICEPS TENOTOMY;  Surgeon: Jarett Walker MD;  Location: [facility] OR [facility] [place];  Service: Orthopedics   • TONSILLECTOMY                         PT Assessment/Plan     Row Name 08/15/19 0910          PT Assessment    Assessment Comments  Pt continues to progress well with strengthening program with additional repetitions completed. Pt reports improved motion after passive stretching. Plan to see pt one time next week, due to pt going on vacation, and will plan weekly visit based off response and work schedule.   -KM        PT Plan    PT Plan Comments  Pt to complete HEP daily. Progress as tolerated  -KM       User Key  (r) = Recorded By, (t) = Taken By, (c) = Cosigned By    Initials Name Provider Type    Meena Bernard PTA Physical Therapy Assistant          Modalities     Row Name 08/15/19 0910             Subjective Comments    Subjective Comments  Pt states her f/u appointment went well. She is  to continue with PT to progress strength and has a return to work date of September 3rd. Pt states her shoulder is sore today, she had completed additional walking, up to 6 miles, and lifted 8ft boards from shoulder to overhead.   -KM         Moist Heat    MH Applied  Yes  -KM      Location  (L) shoulder - pt seated w/ pillow for support  -KM      Rx Minutes  10 mins  -KM      MH Prior to Rx  Yes  -KM         Ice    Ice Applied  Yes  -KM      Location  left shoulder/medial upper arm w/ IFC - pt seated with pillow for support  -KM      Ice S/P Rx  Yes  -KM         ELECTRICAL STIMULATION    Attended/Unattended  Unattended  -KM      Stimulation Type  IFC  -KM      Location/Electrode Placement/Other  left shoulder/upper arm  - seated with CP  -KM         Other Treatment Provided    Taping / Bracing  Trial of kinesiotape using fan strip for bruising/edema medial, upper arm.  Pt to leave tape up to 5 days and trim as needed.  -KM        User Key  (r) = Recorded By, (t) = Taken By, (c) = Cosigned By    Initials Name Provider Type    Meena Bernard PTA Physical Therapy Assistant        Exercises     Row Name 08/15/19 0910             Subjective Comments    Subjective Comments  Pt states her f/u appointment went well. She is to continue with PT to progress strength and has a return to work date of September 3rd. Pt states her shoulder is sore today, she had completed additional walking, up to 6 miles, and lifted 8ft boards from shoulder to overhead.   -KM         Exercise 1    Exercise Name 1  Pulley-FLEX  -KM      Cueing 1  Verbal;Demo  -KM      Time 1  5 min  -KM         Exercise 2    Exercise Name 2  Pulley-Scaption  -KM      Cueing 2  Verbal;Demo  -KM      Time 2  not today- had to go to MD  -KM         Exercise 3    Exercise Name 3  Cane stretch-FLEX  -KM      Cueing 3  Verbal;Demo  -KM      Reps 3  15  -KM         Exercise 4    Exercise Name 4  Cane stretch-ABD  -KM      Cueing 4  Verbal;Demo  -KM      Reps 4  15   -KM         Exercise 5    Exercise Name 5  Cane stretch-ER  -KM      Cueing 5  Verbal;Demo  -KM      Reps 5  15  -KM         Exercise 6    Exercise Name 6  shoulder IR vs theraband  -KM      Cueing 6  Verbal;Demo  -KM      Reps 6  40  -KM      Time 6  silver  -KM         Exercise 7    Exercise Name 7  shoulder ER vs therband  -KM      Cueing 7  Verbal;Demo  -KM      Reps 7  40  -KM      Time 7  silver  -KM         Exercise 8    Exercise Name 8  Shoulder rows vs theraband  -KM      Cueing 8  Verbal;Demo  -KM      Reps 8  40  -KM      Time 8  gold  -KM         Exercise 9    Exercise Name 9  Shoulder Extension vs theraband  -KM      Cueing 9  Verbal;Demo  -KM      Reps 9  40  -KM      Time 9  gold  -KM         Exercise 10    Exercise Name 10  Scaption Up  -KM      Cueing 10  Verbal;Demo  -KM      Reps 10  35  -KM      Time 10  3#  -KM         Exercise 11    Exercise Name 11  Scaption down  -KM      Cueing 11  Verbal;Demo  -KM      Reps 11  40  -KM      Time 11  3#  -KM         Exercise 12    Exercise Name 12  Sidelying ER  -KM      Cueing 12  Verbal;Tactile  -KM      Reps 12  40  -KM      Time 12  3#  -KM         Exercise 13    Exercise Name 13  Prone Hor ABD 90  -KM      Cueing 13  Verbal;Tactile  -KM      Reps 13  40  -KM      Time 13  3#  -KM         Exercise 14    Exercise Name 14  Prone Hor   -KM      Cueing 14  Verbal;Tactile  -KM      Reps 14  40  -KM      Time 14  3#  -KM         Exercise 15    Exercise Name 15  Sleep Stretch  -KM      Cueing 15  Verbal;Tactile  -KM      Reps 15  10x10  -KM        User Key  (r) = Recorded By, (t) = Taken By, (c) = Cosigned By    Initials Name Provider Type    Meena Bernard, PTA Physical Therapy Assistant                      Manual Rx (last 36 hours)      Manual Treatments     Row Name 08/15/19 0910             Manual Rx 1    Manual Rx 1 Location  left shoulder  -KM      Manual Rx 1 Type  PROM into all planes 10-15 reps each  -KM      Manual Rx 1 Duration  15 min   -LARRY        User Key  (r) = Recorded By, (t) = Taken By, (c) = Cosigned By    Initials Name Provider Type    Meena Bernard PTA Physical Therapy Assistant                             Time Calculation:   Start Time: 0910  Stop Time: 1020  Time Calculation (min): 70 min  Therapy Charges for Today     Code Description Service Date Service Provider Modifiers Qty    80308571989 HC PT MANUAL THERAPY EA 15 MIN 8/15/2019 Meena Rizo PTA GP 1    97484059398 HC PT THER PROC EA 15 MIN 8/15/2019 Meena Rizo PTA GP 1                    Meena Rizo PTA  8/15/2019

## 2019-08-20 ENCOUNTER — HOSPITAL ENCOUNTER (OUTPATIENT)
Dept: PHYSICAL THERAPY | Facility: HOSPITAL | Age: 64
Setting detail: THERAPIES SERIES
Discharge: HOME | End: 2019-08-20

## 2019-08-20 DIAGNOSIS — Z98.890 STATUS POST LEFT ROTATOR CUFF REPAIR: Primary | ICD-10-CM

## 2019-08-20 PROCEDURE — 97140 MANUAL THERAPY 1/> REGIONS: CPT

## 2019-08-20 PROCEDURE — G0283 ELEC STIM OTHER THAN WOUND: HCPCS

## 2019-08-20 PROCEDURE — 97110 THERAPEUTIC EXERCISES: CPT

## 2019-08-20 NOTE — THERAPY TREATMENT NOTE
Outpatient Physical Therapy Ortho Treatment Note   [place]     Patient Name: Flores Seaman  : 1955  MRN: [id number]  Today's Date: 2019      Visit Date: 2019    Visit Dx:    ICD-10-CM ICD-9-CM   1. Status post left rotator cuff repair Z98.890 V45.89       Patient Active Problem List   Diagnosis   • Injury of glenoid labrum   • Complete tear of left rotator cuff   • Arthritis of left acromioclavicular joint   • Biceps tendinitis of left upper extremity   • Medial epicondylitis of elbow, right        Past Medical History:   Diagnosis Date   • Asthma    • History of anemia    • History of methicillin resistant staphylococcus aureus (MRSA)     PATRICIA KAUR - MARISEL BLOOM MD TREATED   • Rotator cuff injury     LEFT        Past Surgical History:   Procedure Laterality Date   • APPENDECTOMY     • COLONOSCOPY     • CYST REMOVAL      FOREHEAD   • SHOULDER ARTHROSCOPY W/ ROTATOR CUFF REPAIR Left 2019    Procedure: LEFT SHOULDER ARTHROSCOPY WITH ROTATOR CUFF REPAIR, SUBACROMIAL DECOMPRESSION AND BICEPS TENOTOMY;  Surgeon: Jarett Walker MD;  Location: [facility] OR [facility] [place];  Service: Orthopedics   • TONSILLECTOMY         PT Ortho     Row Name 19 2971       Subjective Comments    Subjective Comments  Pt reports she is feeling better.  Continues to walk and do her HEP  -KM       Subjective Pain    Able to rate subjective pain?  yes  -KM    Pre-Treatment Pain Level  -- not bad today  -KM      User Key  (r) = Recorded By, (t) = Taken By, (c) = Cosigned By    Initials Name Provider Type     Siomara Alexander PTA Physical Therapy Assistant                      PT Assessment/Plan     Row Name 19 [id number]          PT Assessment    Functional Limitations  Performance in leisure activities;Limitations in community activities;Performance in work activities  -KM     Impairments  Impaired flexibility;Muscle strength;Pain;Range of motion  -KM     Assessment Comments  -- pt overall improving with   Rom  and strengthing program.  Discussed options for continuing HEP while in [place]  -       User Key  (r) = Recorded By, (t) = Taken By, (c) = Cosigned By    Initials Name Provider Type    Siomara Benítez PTA Physical Therapy Assistant          Modalities     Row Name 08/20/19 0930             Moist Heat    MH Applied  Yes  -KM      Location  (L) shoulder - pt seated w/ pillow for support  -KM      Rx Minutes  10 mins  -KM      MH Prior to Rx  Yes  -KM         Ice    Ice Applied  Yes  -KM      Location  left shoulder/medial upper arm w/ IFC - pt seated with pillow for support  -KM      Ice S/P Rx  Yes  -KM      Patient reports will apply ice at home to involved area  Yes  -KM         ELECTRICAL STIMULATION    Attended/Unattended  Unattended  -KM      Stimulation Type  IFC  -KM      Location/Electrode Placement/Other  left shoulder/upper arm  - seated with CP  -KM        User Key  (r) = Recorded By, (t) = Taken By, (c) = Cosigned By    Initials Name Provider Type    Siomara Benítez PTA Physical Therapy Assistant        Exercises     Row Name 08/20/19 1141 08/20/19 0930          Subjective Comments    Subjective Comments  --  Pt reports she is feeling better.  Continues to walk and do her HEP  -KM        Subjective Pain    Able to rate subjective pain?  --  yes  -KM     Pre-Treatment Pain Level  --  -- not bad today  -KM        Total Minutes    26658 - PT Manual Therapy Minutes  15  -KM  --        Exercise 1    Exercise Name 1  --  Pulley-FLEX  -KM     Cueing 1  --  Verbal;Demo  -KM     Time 1  --  5 min  -KM        Exercise 2    Exercise Name 2  --  Pulley-Scaption  -KM     Cueing 2  --  Verbal;Demo  -KM     Time 2  --  5 minutes  -KM        Exercise 3    Exercise Name 3  --  Cane stretch-FLEX  -KM     Cueing 3  --  Verbal;Demo  -KM     Reps 3  --  15  -KM        Exercise 4    Exercise Name 4  --  Cane stretch-ABD  -KM     Cueing 4  --  Verbal;Demo  -KM     Reps 4  --  15  -KM        Exercise 5    Exercise  Name 5  --  Cane stretch-ER  -KM     Cueing 5  --  Verbal;Demo  -KM     Reps 5  --  15  -KM        Exercise 6    Exercise Name 6  --  shoulder IR vs theraband  -KM     Cueing 6  --  Verbal;Demo  -KM     Reps 6  --  40  -KM     Time 6  --  silver  -KM        Exercise 7    Exercise Name 7  --  shoulder ER vs therband  -KM     Cueing 7  --  Verbal;Demo  -KM     Reps 7  --  40  -KM     Time 7  --  silver  -KM        Exercise 8    Exercise Name 8  --  Shoulder rows vs theraband  -KM     Cueing 8  --  Verbal;Demo  -KM     Reps 8  --  40  -KM     Time 8  --  gold  -KM        Exercise 9    Exercise Name 9  --  Shoulder Extension vs theraband  -KM     Cueing 9  --  Verbal;Demo  -KM     Reps 9  --  40  -KM     Time 9  --  gold  -KM        Exercise 10    Exercise Name 10  --  Scaption Up  -KM     Cueing 10  --  Verbal;Demo  -KM     Reps 10  --  40  -KM     Time 10  --  3#  -KM        Exercise 11    Exercise Name 11  --  Scaption down  -KM     Cueing 11  --  Verbal;Demo  -KM     Reps 11  --  40  -KM     Time 11  --  3#  -KM        Exercise 12    Exercise Name 12  --  Sidelying ER  -KM     Cueing 12  --  Verbal;Tactile  -KM     Reps 12  --  40  -KM     Time 12  --  3#  -KM        Exercise 13    Exercise Name 13  --  Prone Hor ABD 90  -KM     Cueing 13  --  Verbal;Tactile  -KM     Reps 13  --  40  -KM     Time 13  --  3#  -KM        Exercise 14    Exercise Name 14  --  Prone Hor   -KM     Cueing 14  --  Verbal;Tactile  -KM     Reps 14  --  40  -KM     Time 14  --  3#  -KM        Exercise 15    Exercise Name 15  --  Sleep Stretch  -KM     Cueing 15  --  Verbal;Tactile  -KM     Reps 15  --  10x10  -KM       User Key  (r) = Recorded By, (t) = Taken By, (c) = Cosigned By    Initials Name Provider Type    Siomara Benítez PTA Physical Therapy Assistant                      Manual Rx (last 36 hours)      Manual Treatments     Row Name 08/20/19 1141 08/20/19 0930          Total Minutes    88581 - PT Manual Therapy Minutes  15   -KM  --        Manual Rx 1    Manual Rx 1 Location  --  left shoulder  -KM     Manual Rx 1 Type  --  PROM into all planes 10-15 reps each  -KM     Manual Rx 1 Duration  --  15 min  -KM       User Key  (r) = Recorded By, (t) = Taken By, (c) = Cosigned By    Initials Name Provider Type    Siomara Benítez PTA Physical Therapy Assistant          Pt will be out of town for a week.  Encouraged her to continue HEP.  Pt reports she is returning to work soon.      Therapy Education  Education Details: (pt reports she is going out of town- discussed ex she can do while out of town)  Given: HEP, Symptoms/condition management  Program: Reinforced  How Provided: Verbal, Demonstration  Provided to: Patient  Level of Understanding: Teach back education performed, Verbalized, Demonstrated              Time Calculation:   Start Time: 0930  Stop Time: 1056  Time Calculation (min): 86 min  Therapy Charges for Today     Code Description Service Date Service Provider Modifiers Qty    46468323079 HC PT MANUAL THERAPY EA 15 MIN 8/20/2019 Siomara Alexander PTA GP 1    27190535307 HC PT ELECTRICAL STIM UNATTENDED 8/20/2019 Siomara Alexander PTA  1    [id number]  PT THER PROC EA 15 MIN 8/20/2019 Siomara Alexander PTA GP 1                    Siomara Alexander PTA  8/20/2019

## 2019-08-28 ENCOUNTER — HOSPITAL ENCOUNTER (OUTPATIENT)
Dept: PHYSICAL THERAPY | Facility: HOSPITAL | Age: 64
Setting detail: THERAPIES SERIES
Discharge: HOME | End: 2019-08-28

## 2019-08-28 DIAGNOSIS — Z98.890 STATUS POST LEFT ROTATOR CUFF REPAIR: Primary | ICD-10-CM

## 2019-08-28 PROCEDURE — G0283 ELEC STIM OTHER THAN WOUND: HCPCS

## 2019-08-28 PROCEDURE — 97140 MANUAL THERAPY 1/> REGIONS: CPT

## 2019-08-28 PROCEDURE — 97110 THERAPEUTIC EXERCISES: CPT

## 2019-08-28 NOTE — THERAPY TREATMENT NOTE
Outpatient Physical Therapy Ortho Treatment Note   [place]     Patient Name: Flores Seaman  : 1955  MRN: [id number]  Today's Date: 2019      Visit Date: 2019    Visit Dx:    ICD-10-CM ICD-9-CM   1. Status post left rotator cuff repair Z98.890 V45.89       Patient Active Problem List   Diagnosis   • Injury of glenoid labrum   • Complete tear of left rotator cuff   • Arthritis of left acromioclavicular joint   • Biceps tendinitis of left upper extremity   • Medial epicondylitis of elbow, right        Past Medical History:   Diagnosis Date   • Asthma    • History of anemia    • History of methicillin resistant staphylococcus aureus (MRSA)     PATRICIA KAUR - MARISEL BLOOM MD TREATED   • Rotator cuff injury     LEFT        Past Surgical History:   Procedure Laterality Date   • APPENDECTOMY     • COLONOSCOPY     • CYST REMOVAL      FOREHEAD   • SHOULDER ARTHROSCOPY W/ ROTATOR CUFF REPAIR Left 2019    Procedure: LEFT SHOULDER ARTHROSCOPY WITH ROTATOR CUFF REPAIR, SUBACROMIAL DECOMPRESSION AND BICEPS TENOTOMY;  Surgeon: Jarett Walker MD;  [place] [facility] [place] [facility] [place]  Service: Orthopedics   • TONSILLECTOMY         PT Ortho     Row Name 19 3171       Subjective Comments    Subjective Comments  pt reports stiffness today.  Reports soreness in biceps- thinks due to maybe carrying stuff when she was on vacation last week.  -KM       Subjective Pain    Subjective Pain Comment  reports stiffness in shoulder/scapula area.  soreness anterior shoulder/biceps- reports she carried a water bottle alot when hiking  -KM      User Key  (r) = Recorded By, (t) = Taken By, (c) = Cosigned By    Initials Name Provider Type    Siomara Benítez PTA Physical Therapy Assistant                      PT Assessment/Plan     Row Name 19 6162          PT Assessment    Functional Limitations  Performance in leisure activities;Performance in work activities  -KM     Impairments  Muscle  strength;Pain;Range of motion  -KM     Assessment Comments  -- pt reports she is stiff and has some soreness in biceps today.  Thinks it may be due to carrying a water bottle when hiking.  Added serratus punch- pt reports this helped one of the areas that was stiff.    -KM       User Key  (r) = Recorded By, (t) = Taken By, (c) = Cosigned By    Initials Name Provider Type    Siomara Benítez PTA Physical Therapy Assistant          Modalities     Row Name 08/28/19 0930             Moist Heat    MH Applied  Yes  -KM      Location  (L) shoulder - pt seated w/ pillow for support  -KM      Rx Minutes  10 mins  -KM      MH Prior to Rx  Yes  -KM         Ice    Ice Applied  Yes  -KM      Location  left shoulder/medial upper arm w/ IFC - pt supine with roll to support arm  -KM      Ice S/P Rx  Yes  -KM      Patient reports will apply ice at home to involved area  Yes  -KM         ELECTRICAL STIMULATION    Attended/Unattended  Unattended  -KM      Stimulation Type  IFC  -KM      Location/Electrode Placement/Other  left shoulder/upper arm  - with cp  -KM       PT E-Stim Unattended (Manual) Minutes  15  -KM        User Key  (r) = Recorded By, (t) = Taken By, (c) = Cosigned By    Initials Name Provider Type    Siomara Benítez PTA Physical Therapy Assistant        Exercises     Row Name 08/28/19 1156 08/28/19 0930          Subjective Comments    Subjective Comments  --  pt reports stiffness today.  Reports soreness in biceps- thinks due to maybe carrying stuff when she was on vacation last week.  -KM        Subjective Pain    Subjective Pain Comment  --  reports stiffness in shoulder/scapula area.  soreness anterior shoulder/biceps- reports she carried a water bottle alot when hiking  -KM        Total Minutes    60957 - PT Manual Therapy Minutes  14  -KM  --        Exercise 1    Exercise Name 1  --  Pulley-FLEX  -KM     Cueing 1  --  Verbal;Demo  -KM     Time 1  --  5 min  -KM        Exercise 2    Exercise Name 2  --   Pulley-Scaption  -KM     Cueing 2  --  Verbal;Demo  -KM     Time 2  --  5 minutes  -KM        Exercise 3    Exercise Name 3  --  Cane stretch-FLEX  -KM     Cueing 3  --  Verbal;Demo  -KM     Reps 3  --  15  -KM        Exercise 4    Exercise Name 4  --  Cane stretch-ABD  -KM     Cueing 4  --  Verbal;Demo  -KM     Reps 4  --  15  -KM        Exercise 5    Exercise Name 5  --  Cane stretch-ER  -KM     Cueing 5  --  Verbal;Demo  -KM     Reps 5  --  15  -KM        Exercise 6    Exercise Name 6  --  shoulder IR vs theraband  -KM     Cueing 6  --  Verbal;Demo  -KM     Reps 6  --  40  -KM     Time 6  --  silver  -KM        Exercise 7    Exercise Name 7  --  shoulder ER vs therband  -KM     Cueing 7  --  Verbal;Demo  -KM     Reps 7  --  40  -KM     Time 7  --  silver  -KM        Exercise 8    Exercise Name 8  --  Shoulder rows vs theraband  -KM     Cueing 8  --  Verbal;Demo  -KM     Reps 8  --  40  -KM     Time 8  --  gold  -KM        Exercise 9    Exercise Name 9  --  Shoulder Extension vs theraband  -KM     Cueing 9  --  Verbal;Demo  -KM     Reps 9  --  40  -KM     Time 9  --  gold  -KM        Exercise 10    Exercise Name 10  --  Scaption Up  -KM     Cueing 10  --  Verbal;Demo  -KM     Reps 10  --  40  -KM     Time 10  --  3#  -KM        Exercise 11    Exercise Name 11  --  Scaption down  -KM     Cueing 11  --  Verbal;Demo  -KM     Reps 11  --  40  -KM     Time 11  --  3#  -KM        Exercise 12    Exercise Name 12  --  Sidelying ER  -KM     Cueing 12  --  Verbal;Tactile  -KM     Reps 12  --  40  -KM     Time 12  --  3#  -KM        Exercise 13    Exercise Name 13  --  Prone Hor ABD 90  -KM     Cueing 13  --  Verbal;Tactile  -KM     Reps 13  --  40  -KM     Time 13  --  3#  -KM        Exercise 14    Exercise Name 14  --  Prone Hor   -KM     Cueing 14  --  Verbal;Tactile  -KM     Reps 14  --  40  -KM     Time 14  --  3#  -KM        Exercise 15    Exercise Name 15  --  Sleep Stretch  -KM     Cueing 15  --   Verbal;Tactile  -KM     Reps 15  --  10x10  -KM        Exercise 16    Exercise Name 16  --  serratus punch  -KM     Reps 16  --  15  -KM       User Key  (r) = Recorded By, (t) = Taken By, (c) = Cosigned By    Initials Name Provider Type    Siomara Benítez PTA Physical Therapy Assistant                      Manual Rx (last 36 hours)      Manual Treatments     Row Name 08/28/19 1156 08/28/19 0930          Total Minutes    21138 - PT Manual Therapy Minutes  14  -KM  --        Manual Rx 1    Manual Rx 1 Location  --  left shoulder  -KM     Manual Rx 1 Type  --  PROM into all planes 10-15 reps each  -KM     Manual Rx 1 Duration  --  14  -KM       User Key  (r) = Recorded By, (t) = Taken By, (c) = Cosigned By    Initials Name Provider Type    Siomara Benítez PTA Physical Therapy Assistant          pt supine for CP/estim today to see if this position would benefit pt- CPs to scapula/shoulder/biceps with stim.      Therapy Education  Education Details: (encouraged pt to use ice as needed.  Pt reports new ex is in one of the areas she felt stiff- better after session.  )  Given: HEP, Symptoms/condition management  Program: Reinforced, Progressed  How Provided: Verbal, Demonstration  Provided to: Patient  Level of Understanding: Teach back education performed, Verbalized, Demonstrated              Time Calculation:   Start Time: 0930  Stop Time: 1055  Time Calculation (min): 85 min  Therapy Charges for Today     Code Description Service Date Service Provider Modifiers Qty    67318504306 HC PT ELECTRICAL STIM UNATTENDED 8/28/2019 Siomara Alexander PTA  1    85618920069 HC PT MANUAL THERAPY EA 15 MIN 8/28/2019 Siomara Alexander PTA GP 1    64109045620 HC PT THER PROC EA 15 MIN 8/28/2019 Siomara Alexander PTA GP 1                    Siomara Alexander PTA  8/28/2019

## 2019-08-30 ENCOUNTER — HOSPITAL ENCOUNTER (OUTPATIENT)
Dept: PHYSICAL THERAPY | Facility: HOSPITAL | Age: 64
Setting detail: THERAPIES SERIES
Discharge: HOME | End: 2019-08-30

## 2019-08-30 DIAGNOSIS — Z98.890 STATUS POST LEFT ROTATOR CUFF REPAIR: Primary | ICD-10-CM

## 2019-08-30 PROCEDURE — 97110 THERAPEUTIC EXERCISES: CPT | Performed by: PHYSICAL THERAPIST

## 2019-08-30 PROCEDURE — 97140 MANUAL THERAPY 1/> REGIONS: CPT | Performed by: PHYSICAL THERAPIST

## 2019-08-30 NOTE — THERAPY TREATMENT NOTE
Outpatient Physical Therapy Ortho Treatment Note   Mary oDmínguez     Patient Name: Flores Seaman  : 1955  MRN: [id number]  Today's Date: 2019      Visit Date: 2019    Visit Dx:    ICD-10-CM ICD-9-CM   1. Status post left rotator cuff repair Z98.890 V45.89       Patient Active Problem List   Diagnosis   • Injury of glenoid labrum   • Complete tear of left rotator cuff   • Arthritis of left acromioclavicular joint   • Biceps tendinitis of left upper extremity   • Medial epicondylitis of elbow, right        Past Medical History:   Diagnosis Date   • Asthma    • History of anemia    • History of methicillin resistant staphylococcus aureus (MRSA)     IFROM A CUT - MARISEL BLOOM MD TREATED   • Rotator cuff injury     LEFT        Past Surgical History:   Procedure Laterality Date   • APPENDECTOMY     • COLONOSCOPY     • CYST REMOVAL      FOREHEAD   • SHOULDER ARTHROSCOPY W/ ROTATOR CUFF REPAIR Left 2019    Procedure: LEFT SHOULDER ARTHROSCOPY WITH ROTATOR CUFF REPAIR, SUBACROMIAL DECOMPRESSION AND BICEPS TENOTOMY;  Surgeon: Jarett Walker MD;  Location: [facility] OR [facility] [place];  Service: Orthopedics   • TONSILLECTOMY         PT Ortho     Row Name 19 0830       Subjective Comments    Subjective Comments  Pt states her shoulder is feeling pretty good. She states she is going back to work on Tuesday.  -GC       Left Upper Ext    Lt Shoulder Abduction AROM  178 degrees  -GC    Lt Shoulder Flexion AROM  170 degrees  -GC    Lt Shoulder External Rotation AROM  83 degrees  -GC    Lt Shoulder Internal Rotation AROM  74 degrees  -GC       MMT Left Upper Ext    Lt Shoulder Flexion MMT, Gross Movement  (4+/5) good plus  -GC    Lt Shoulder Extension MMT, Gross Movement  (5/5) normal  -GC    Lt Shoulder ABduction MMT, Gross Movement  (5/5) normal  -GC    Lt Shoulder ADduction MMT, Gross Movement  (5/5) normal  -GC    Lt Shoulder Internal Rotation MMT, Gross Movement  (5/5) normal  -GC    Lt Shoulder  External Rotation MMT, Gross Movement  (4/5) good  -GC    Lt Upper Extremity Comments   scaption strength is 4/5  -GC    Row Name 08/28/19 0930       Subjective Comments    Subjective Comments  pt reports stiffness today.  Reports soreness in biceps- thinks due to maybe carrying stuff when she was on vacation last week.  -KM       Subjective Pain    Subjective Pain Comment  reports stiffness in shoulder/scapula area.  soreness anterior shoulder/biceps- reports she carried a water bottle alot when hiking  -KM      User Key  (r) = Recorded By, (t) = Taken By, (c) = Cosigned By    Initials Name Provider Type    KM Siomara Alexander, PTA Physical Therapy Assistant    GC Liam Henson, PT Physical Therapist                      PT Assessment/Plan     Row Name 08/30/19 0830          PT Assessment    Assessment Comments  Pt is doing well with increased shoulder girdle strength and increased shoulder ROM. Feel pt is nearing end of therapy as most goals have been met.   -GC        PT Plan    PT Plan Comments  Pt returns to work next week. She is to continue her HEP. Will re-ck after she returns to work and if she is doing well, will discharge then.  -GC       User Key  (r) = Recorded By, (t) = Taken By, (c) = Cosigned By    Initials Name Provider Type    Liam Godinez, PT Physical Therapist          Modalities     Row Name 08/30/19 0830             Moist Heat    MH Applied  Yes  -GC      Location  (L) shoulder - pt seated w/ pillow for support  -GC      Rx Minutes  10 mins  -GC      MH Prior to Rx  Yes  -GC         Ice    Ice Applied  Yes  -GC      Location  left shoulder/medial upper arm w/ IFC - pt supine with roll to support arm  -GC      Ice S/P Rx  Yes  -GC      Patient reports will apply ice at home to involved area  Yes  -GC         ELECTRICAL STIMULATION    Attended/Unattended  Unattended  -GC      Stimulation Type  IFC  -GC      Location/Electrode Placement/Other  left shoulder/upper arm  - with cp  -GC        PT E-Stim Unattended (Manual) Minutes  15  -GC        User Key  (r) = Recorded By, (t) = Taken By, (c) = Cosigned By    Initials Name Provider Type    GC Liam Henson, PT Physical Therapist        Exercises     Row Name 08/30/19 0830             Subjective Comments    Subjective Comments  Pt states her shoulder is feeling pretty good. She states she is going back to work on Tuesday.  -GC         Exercise 1    Exercise Name 1  Pulley-FLEX  -GC      Cueing 1  Verbal;Demo  -GC      Time 1  5 min  -GC         Exercise 2    Exercise Name 2  Pulley-Scaption  -GC      Cueing 2  Verbal;Demo  -GC      Time 2  5 minutes  -GC         Exercise 3    Exercise Name 3  Cane stretch-FLEX  -GC      Cueing 3  Verbal;Demo  -GC      Reps 3  15  -GC         Exercise 4    Exercise Name 4  Cane stretch-ABD  -GC      Cueing 4  Verbal;Demo  -GC      Reps 4  15  -GC         Exercise 5    Exercise Name 5  Cane stretch-ER  -GC      Cueing 5  Verbal;Demo  -GC      Reps 5  15  -GC         Exercise 6    Exercise Name 6  shoulder IR vs theraband  -GC      Cueing 6  Verbal;Demo  -GC      Reps 6  40  -GC      Time 6  silver  -GC         Exercise 7    Exercise Name 7  shoulder ER vs therband  -GC      Cueing 7  Verbal;Demo  -GC      Reps 7  40  -GC      Time 7  silver  -GC         Exercise 8    Exercise Name 8  Shoulder rows vs theraband  -GC      Cueing 8  Verbal;Demo  -GC      Reps 8  40  -GC      Time 8  gold  -GC         Exercise 9    Exercise Name 9  Shoulder Extension vs theraband  -GC      Cueing 9  Verbal;Demo  -GC      Reps 9  40  -GC      Time 9  gold  -GC         Exercise 10    Exercise Name 10  Scaption Up  -GC      Cueing 10  Verbal;Demo  -GC      Reps 10  40  -GC      Time 10  3#  -GC         Exercise 11    Exercise Name 11  Scaption down  -GC      Cueing 11  Verbal;Demo  -GC      Reps 11  40  -GC      Time 11  3#  -GC         Exercise 12    Exercise Name 12  Sidelying ER  -GC      Cueing 12  Verbal;Tactile  -GC      Reps 12  40  -GC       Time 12  3#  -GC         Exercise 13    Exercise Name 13  Prone Hor ABD 90  -GC      Cueing 13  Verbal;Tactile  -GC      Reps 13  40  -GC      Time 13  3#  -GC         Exercise 14    Exercise Name 14  Prone Hor   -GC      Cueing 14  Verbal;Tactile  -GC      Reps 14  40  -GC      Time 14  3#  -GC         Exercise 15    Exercise Name 15  Sleep Stretch  -GC      Cueing 15  Verbal;Tactile  -GC      Reps 15  10x10  -GC         Exercise 16    Exercise Name 16  serratus punch  -GC      Reps 16  15  -GC        User Key  (r) = Recorded By, (t) = Taken By, (c) = Cosigned By    Initials Name Provider Type     Liam Henson, PT Physical Therapist                      Manual Rx (last 36 hours)      Manual Treatments     Row Name 08/30/19 0830             Manual Rx 1    Manual Rx 1 Location  left shoulder  -      Manual Rx 1 Type  PROM into all planes 10-15 reps each  -      Manual Rx 1 Duration  15 min  -        User Key  (r) = Recorded By, (t) = Taken By, (c) = Cosigned By    Initials Name Provider Type     Liam Henson, PT Physical Therapist          PT OP Goals     Row Name 08/30/19 0830          PT Short Term Goals    STG Date to Achieve  06/06/19  -     STG 1  Patient demonstrates PROM left shoulder flexion and abduction to >130 degrees  -     STG 1 Progress  Met  -     STG 2  Patient demonstrates PROM left shoulder ER to 45 degrees  -     STG 2 Progress  Met  -     STG 3  Patient reports improved ability to dress self and perform self care with pain level <2/10 at worst  -     STG 3 Progress  Met  -     STG 4  Patient able to tolerate light waist level ADLs without increased pain   -     STG 4 Progress  Met  -        Long Term Goals    LTG Date to Achieve  07/06/19  -     LTG 1  Patient demonstrates AROM/ AAROM left shoulder to >130 degrees   -     LTG 1 Progress  Met  -     LTG 2  Patient demonstrates left shoulder strength to at least 4/5 throughout  -     LTG 2 Progress   Met  -GC     LTG 3  Patient reports she is able to complete light household ADLs without increased pain or compensation.  -GC     LTG 3 Progress  Met  -GC     LTG 4  Patient independent with HEP  -GC     LTG 4 Progress  Met  -GC       User Key  (r) = Recorded By, (t) = Taken By, (c) = Cosigned By    Initials Name Provider Type    GC Liam Henson, PT Physical Therapist                         Time Calculation:   Start Time: 0830  Stop Time: 0948  Time Calculation (min): 78 min  Therapy Charges for Today     Code Description Service Date Service Provider Modifiers Qty    89931135005  PT MANUAL THERAPY EA 15 MIN 8/30/2019 Liam Henson, PT GP 1    07911229062 HC PT THER PROC EA 15 MIN 8/30/2019 Liam Henson, PT GP 1                    Liam Henson PT  8/30/2019

## 2019-09-04 ENCOUNTER — HOSPITAL ENCOUNTER (OUTPATIENT)
Dept: PHYSICAL THERAPY | Facility: HOSPITAL | Age: 64
Setting detail: THERAPIES SERIES
Discharge: HOME | End: 2019-09-04

## 2019-09-04 DIAGNOSIS — Z98.890 STATUS POST LEFT ROTATOR CUFF REPAIR: Primary | ICD-10-CM

## 2019-09-04 PROCEDURE — 97140 MANUAL THERAPY 1/> REGIONS: CPT | Performed by: PHYSICAL THERAPIST

## 2019-09-04 PROCEDURE — 97110 THERAPEUTIC EXERCISES: CPT | Performed by: PHYSICAL THERAPIST

## 2019-09-04 NOTE — THERAPY TREATMENT NOTE
Outpatient Physical Therapy Ortho Treatment Note   Mary Domínguez     Patient Name: Flores Seaman  : 1955  MRN: [id number]  Today's Date: 2019      Visit Date: 2019    Visit Dx:    ICD-10-CM ICD-9-CM   1. Status post left rotator cuff repair Z98.890 V45.89       Patient Active Problem List   Diagnosis   • Injury of glenoid labrum   • Complete tear of left rotator cuff   • Arthritis of left acromioclavicular joint   • Biceps tendinitis of left upper extremity   • Medial epicondylitis of elbow, right        Past Medical History:   Diagnosis Date   • Asthma    • History of anemia    • History of methicillin resistant staphylococcus aureus (MRSA)     PATRICIA KAUR - MARISEL BLOOM MD TREATED   • Rotator cuff injury     LEFT        Past Surgical History:   Procedure Laterality Date   • APPENDECTOMY     • COLONOSCOPY     • CYST REMOVAL      FOREHEAD   • SHOULDER ARTHROSCOPY W/ ROTATOR CUFF REPAIR Left 2019    Procedure: LEFT SHOULDER ARTHROSCOPY WITH ROTATOR CUFF REPAIR, SUBACROMIAL DECOMPRESSION AND BICEPS TENOTOMY;  Surgeon: Jarett Walker MD;  Location: [facility] OR [facility] [place];  Service: Orthopedics   • TONSILLECTOMY         PT Ortho     Row Name 19 1000       Subjective Comments    Subjective Comments  Pt states her shoulder got a little sore after going back to work, but 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  -      User Key  (r) = Recorded By, (t) = Taken By, (c) = Cosigned By    Initials Name Provider Type     Liam Henson, PT Physical Therapist                      PT Assessment/Plan     Row Name 19 1000          PT Assessment    Assessment Comments  Pt is doing well with no loss of ROM noted compared to previous visits. She does not show any ill effects or residuals from her return to work.  -        PT Plan    PT Plan Comments  LAURA richards continue her HEP daily. Will re-ck again in approximately 10 days. Will  continue to wean from therapy.  -GC       User Key  (r) = Recorded By, (t) = Taken By, (c) = Cosigned By    Initials Name Provider Type    GC Liam Henson, PT Physical Therapist          Modalities     Row Name 09/04/19 1000             Moist Heat    MH Applied  Yes  -GC      Location  (L) shoulder - pt seated w/ pillow for support  -GC      Rx Minutes  10 mins  -GC      MH Prior to Rx  Yes  -GC         Ice    Ice Applied  Yes  -GC      Location  left shoulder/medial upper arm w/ IFC - pt supine with roll to support arm  -GC      Ice S/P Rx  Yes  -GC      Patient reports will apply ice at home to involved area  Yes  -GC         ELECTRICAL STIMULATION    Attended/Unattended  Unattended  -GC      Stimulation Type  IFC  -GC      Location/Electrode Placement/Other  left shoulder/upper arm  - with cp  -GC       PT E-Stim Unattended (Manual) Minutes  15  -GC        User Key  (r) = Recorded By, (t) = Taken By, (c) = Cosigned By    Initials Name Provider Type    Liam Godinez, PT Physical Therapist        OP Exercises     Row Name 09/04/19 1000             Subjective Comments    Subjective Comments  Pt states her shoulder got a little sore after going back to work, but 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  -GC         Exercise 1    Exercise Name 1  Pulley-FLEX  -GC      Cueing 1  Verbal;Demo  -GC      Time 1  5 min  -GC         Exercise 2    Exercise Name 2  Pulley-Scaption  -GC      Cueing 2  Verbal;Demo  -GC      Time 2  5 minutes  -GC         Exercise 3    Exercise Name 3  Cane stretch-FLEX  -GC      Cueing 3  Verbal;Demo  -GC      Reps 3  15  -GC         Exercise 4    Exercise Name 4  Cane stretch-ABD  -GC      Cueing 4  Verbal;Demo  -GC      Reps 4  15  -GC         Exercise 5    Exercise Name 5  Cane stretch-ER  -GC      Cueing 5  Verbal;Demo  -GC      Reps 5  15  -GC         Exercise 6    Exercise Name 6  shoulder IR vs theraband  -GC       Cueing 6  Verbal;Demo  -GC      Reps 6  40  -GC      Time 6  silver  -GC         Exercise 7    Exercise Name 7  shoulder ER vs therband  -GC      Cueing 7  Verbal;Demo  -GC      Reps 7  40  -GC      Time 7  silver  -GC         Exercise 8    Exercise Name 8  Shoulder rows vs theraband  -GC      Cueing 8  Verbal;Demo  -GC      Reps 8  40  -GC      Time 8  gold  -GC         Exercise 9    Exercise Name 9  Shoulder Extension vs theraband  -GC      Cueing 9  Verbal;Demo  -GC      Reps 9  40  -GC      Time 9  gold  -GC         Exercise 10    Exercise Name 10  Scaption Up  -GC      Cueing 10  Verbal;Demo  -GC      Reps 10  40  -GC      Time 10  3#  -GC         Exercise 11    Exercise Name 11  Scaption down  -GC      Cueing 11  Verbal;Demo  -GC      Reps 11  40  -GC      Time 11  3#  -GC         Exercise 12    Exercise Name 12  Sidelying ER  -GC      Cueing 12  Verbal;Tactile  -GC      Reps 12  40  -GC      Time 12  3#  -GC         Exercise 13    Exercise Name 13  Prone Hor ABD 90  -GC      Cueing 13  Verbal;Tactile  -GC      Reps 13  40  -GC      Time 13  3#  -GC         Exercise 14    Exercise Name 14  Prone Hor   -GC      Cueing 14  Verbal;Tactile  -GC      Reps 14  40  -GC      Time 14  3#  -GC         Exercise 15    Exercise Name 15  Sleep Stretch  -GC      Cueing 15  Verbal;Tactile  -GC      Reps 15  10x10  -GC         Exercise 16    Exercise Name 16  serratus punch  -GC      Reps 16  15  -GC        User Key  (r) = Recorded By, (t) = Taken By, (c) = Cosigned By    Initials Name Provider Type    GC Liam Henson, PT Physical Therapist                      Manual Rx (last 36 hours)      Manual Treatments     Row Name 09/04/19 1000             Manual Rx 1    Manual Rx 1 Location  left shoulder  -GC      Manual Rx 1 Type  PROM into all planes 10-15 reps each  -GC      Manual Rx 1 Duration  15 min  -GC        User Key  (r) = Recorded By, (t) = Taken By, (c) = Cosigned By    Initials Name Provider Type    GC  Liam Henson, PT Physical Therapist                             Time Calculation:   Start Time: 1000  Stop Time: 1126  Time Calculation (min): 86 min  Therapy Charges for Today     Code Description Service Date Service Provider Modifiers Qty    77681907117  PT MANUAL THERAPY EA 15 MIN 9/4/2019 Liam Henson, PT GP 1    [id number]  PT THER PROC EA 15 MIN 9/4/2019 Liam Henson, PT GP 1                    Liam Henson PT  9/4/2019

## 2019-09-12 ENCOUNTER — DOCUMENTATION (OUTPATIENT)
Dept: GASTROENTEROLOGY | Facility: CLINIC | Age: 64
End: 2019-09-12

## 2019-09-12 ENCOUNTER — HOSPITAL ENCOUNTER (OUTPATIENT)
Dept: PHYSICAL THERAPY | Facility: HOSPITAL | Age: 64
Setting detail: THERAPIES SERIES
Discharge: HOME | End: 2019-09-12

## 2019-09-12 DIAGNOSIS — Z98.890 STATUS POST LEFT ROTATOR CUFF REPAIR: Primary | ICD-10-CM

## 2019-09-12 PROCEDURE — 97140 MANUAL THERAPY 1/> REGIONS: CPT | Performed by: PHYSICAL THERAPIST

## 2019-09-12 PROCEDURE — 97110 THERAPEUTIC EXERCISES: CPT | Performed by: PHYSICAL THERAPIST

## 2019-09-12 NOTE — THERAPY TREATMENT NOTE
Outpatient Physical Therapy Ortho Treatment Note   [place]     Patient Name: Flores Seaman  : 1955  MRN: [id number]  Today's Date: 2019      Visit Date: 2019    Visit Dx:    ICD-10-CM ICD-9-CM   1. Status post left rotator cuff repair Z98.890 V45.89       Patient Active Problem List   Diagnosis   • Injury of glenoid labrum   • Complete tear of left rotator cuff   • Arthritis of left acromioclavicular joint   • Biceps tendinitis of left upper extremity   • Medial epicondylitis of elbow, right        Past Medical History:   Diagnosis Date   • Asthma    • History of anemia    • History of methicillin resistant staphylococcus aureus (MRSA)     PATRICIA KAUR - MARISEL BLOOM MD TREATED   • Rotator cuff injury     LEFT        Past Surgical History:   Procedure Laterality Date   • APPENDECTOMY     • COLONOSCOPY     • CYST REMOVAL      FOREHEAD   • SHOULDER ARTHROSCOPY W/ ROTATOR CUFF REPAIR Left 2019    Procedure: LEFT SHOULDER ARTHROSCOPY WITH ROTATOR CUFF REPAIR, SUBACROMIAL DECOMPRESSION AND BICEPS TENOTOMY;  Surgeon: Jarett Walker MD;  Location: [facility] OR [facility] [place];  Service: Orthopedics   • TONSILLECTOMY         PT Ortho     Row Name 19       Subjective Comments    Subjective Comments  Pt states her shoulder is feeling better, but she is still getting sore after working all day.  -      User Key  (r) = Recorded By, (t) = Taken By, (c) = Cosigned By    Initials Name Provider Type     Liam Henson, PT Physical Therapist                      PT Assessment/Plan     Row Name 19          PT Assessment    Assessment Comments  Pt continues to do well with imrproving function.  -        PT Plan    PT Plan Comments  Pt is to continue her HEP daily.  -       User Key  (r) = Recorded By, (t) = Taken By, (c) = Cosigned By    Initials Name Provider Type     Liam Henson, PT Physical Therapist          Modalities     Row Name 19             Moist Heat     MH Applied  Yes  -GC      Location  (L) shoulder - pt seated w/ pillow for support  -GC      Rx Minutes  10 mins  -GC      MH Prior to Rx  Yes  -GC         Ice    Location  left shoulder/medial upper arm w/ IFC - pt supine with roll to support arm  -GC      Ice S/P Rx  Yes  -GC      Patient reports will apply ice at home to involved area  Yes  -GC         ELECTRICAL STIMULATION    Attended/Unattended  Unattended  -GC      Stimulation Type  IFC  -GC      Location/Electrode Placement/Other  left shoulder/upper arm  - with cp  -GC        User Key  (r) = Recorded By, (t) = Taken By, (c) = Cosigned By    Initials Name Provider Type    Liam Godinez, PT Physical Therapist        OP Exercises     Row Name 09/12/19 0900             Subjective Comments    Subjective Comments  Pt states her shoulder is feeling better, but she is still getting sore after working all day.  -GC         Exercise 1    Exercise Name 1  Pulley-FLEX  -GC      Cueing 1  Verbal;Demo  -GC      Time 1  5 min  -GC         Exercise 2    Exercise Name 2  Pulley-Scaption  -GC      Cueing 2  Verbal;Demo  -GC      Time 2  5 minutes  -GC         Exercise 3    Exercise Name 3  Cane stretch-FLEX  -GC      Cueing 3  Verbal;Demo  -GC      Reps 3  15  -GC         Exercise 4    Exercise Name 4  Cane stretch-ABD  -GC      Cueing 4  Verbal;Demo  -GC      Reps 4  15  -GC         Exercise 5    Exercise Name 5  Cane stretch-ER  -GC      Cueing 5  Verbal;Demo  -GC      Reps 5  15  -GC         Exercise 6    Exercise Name 6  shoulder IR vs theraband  -GC      Cueing 6  Verbal;Demo  -GC      Reps 6  40  -GC      Time 6  silver  -GC         Exercise 7    Exercise Name 7  shoulder ER vs therband  -GC      Cueing 7  Verbal;Demo  -GC      Reps 7  40  -GC      Time 7  silver  -GC         Exercise 8    Exercise Name 8  Shoulder rows vs theraband  -GC      Cueing 8  Verbal;Demo  -GC      Reps 8  40  -GC      Time 8  gold  -GC         Exercise 9    Exercise Name 9  Shoulder  Extension vs theraband  -GC      Cueing 9  Verbal;Demo  -GC      Reps 9  40  -GC      Time 9  gold  -GC         Exercise 10    Exercise Name 10  Scaption Up  -GC      Cueing 10  Verbal;Demo  -GC      Reps 10  40  -GC      Time 10  3#  -GC         Exercise 11    Exercise Name 11  Scaption down  -GC      Cueing 11  Verbal;Demo  -GC      Reps 11  40  -GC      Time 11  3#  -GC         Exercise 12    Exercise Name 12  Sidelying ER  -GC      Cueing 12  Verbal;Tactile  -GC      Reps 12  40  -GC      Time 12  3#  -GC         Exercise 13    Exercise Name 13  Prone Hor ABD 90  -GC      Cueing 13  Verbal;Tactile  -GC      Reps 13  40  -GC      Time 13  3#  -GC         Exercise 14    Exercise Name 14  Prone Hor   -GC      Cueing 14  Verbal;Tactile  -GC      Reps 14  40  -GC      Time 14  3#  -GC         Exercise 15    Exercise Name 15  Sleep Stretch  -GC      Cueing 15  Verbal;Tactile  -GC      Reps 15  10x10  -GC         Exercise 16    Exercise Name 16  serratus punch  -GC      Reps 16  15  -GC        User Key  (r) = Recorded By, (t) = Taken By, (c) = Cosigned By    Initials Name Provider Type     Liam Henson, PT Physical Therapist                      Manual Rx (last 36 hours)      Manual Treatments     Row Name 09/12/19 0900             Manual Rx 1    Manual Rx 1 Location  left shoulder  -GC      Manual Rx 1 Type  PROM into all planes 10-15 reps each  -GC      Manual Rx 1 Duration  15 min  -GC        User Key  (r) = Recorded By, (t) = Taken By, (c) = Cosigned By    Initials Name Provider Type     Liam Henson, PT Physical Therapist                             Time Calculation:   Start Time: 0900  Stop Time: 1021  Time Calculation (min): 81 min  Therapy Charges for Today     Code Description Service Date Service Provider Modifiers Qty    95882725551 HC PT MANUAL THERAPY EA 15 MIN 9/12/2019 Liam Henson, PT GP 1    [id number] HC PT THER PROC EA 15 MIN 9/12/2019 Liam Henson, PT GP 1                    Liam  Sixto, PT  9/12/2019

## 2019-09-18 ENCOUNTER — HOSPITAL ENCOUNTER (OUTPATIENT)
Dept: PHYSICAL THERAPY | Facility: HOSPITAL | Age: 64
Setting detail: THERAPIES SERIES
Discharge: HOME | End: 2019-09-18

## 2019-09-18 ENCOUNTER — TELEPHONE (OUTPATIENT)
Dept: GASTROENTEROLOGY | Facility: CLINIC | Age: 64
End: 2019-09-18

## 2019-09-18 DIAGNOSIS — Z98.890 STATUS POST LEFT ROTATOR CUFF REPAIR: Primary | ICD-10-CM

## 2019-09-18 PROCEDURE — 97140 MANUAL THERAPY 1/> REGIONS: CPT | Performed by: PHYSICAL THERAPIST

## 2019-09-18 PROCEDURE — 97110 THERAPEUTIC EXERCISES: CPT | Performed by: PHYSICAL THERAPIST

## 2019-09-18 NOTE — THERAPY TREATMENT NOTE
Outpatient Physical Therapy Ortho Treatment Note   Mary Domínguez     Patient Name: Flores Seaman  : 1955  MRN: [id number]  Today's Date: 2019      Visit Date: 2019    Visit Dx:    ICD-10-CM ICD-9-CM   1. Status post left rotator cuff repair Z98.890 V45.89       Patient Active Problem List   Diagnosis   • Injury of glenoid labrum   • Complete tear of left rotator cuff   • Arthritis of left acromioclavicular joint   • Biceps tendinitis of left upper extremity   • Medial epicondylitis of elbow, right        Past Medical History:   Diagnosis Date   • Asthma    • History of anemia    • History of methicillin resistant staphylococcus aureus (MRSA)     PATRICIA KAUR - MARISEL BLOOM MD TREATED   • Rotator cuff injury     LEFT        Past Surgical History:   Procedure Laterality Date   • APPENDECTOMY     • COLONOSCOPY     • CYST REMOVAL      FOREHEAD   • SHOULDER ARTHROSCOPY W/ ROTATOR CUFF REPAIR Left 2019    Procedure: LEFT SHOULDER ARTHROSCOPY WITH ROTATOR CUFF REPAIR, SUBACROMIAL DECOMPRESSION AND BICEPS TENOTOMY;  Surgeon: Jarett Walker MD;  [place] [facility] [place] [facility] [place]  Service: Orthopedics   • TONSILLECTOMY         PT Ortho     Row Name 19 09       Subjective Comments    Subjective Comments  Pt states her shoulder is sore from working multiple shifts, but overall she feels like she is doing well.  -      User Key  (r) = Recorded By, (t) = Taken By, (c) = Cosigned By    Initials Name Provider Type    Liam Godinez PT Physical Therapist                      PT Assessment/Plan     Row Name 19          PT Assessment    Assessment Comments  Pt continues to do well with improving function. She tolerated increased weight with her exercises.  -        PT Plan    PT Plan Comments  Pt is to continue her HEP daily.  -       User Key  (r) = Recorded By, (t) = Taken By, (c) = Cosigned By    Initials Name Provider Type    Liam Godinez PT Physical Therapist           Modalities     Row Name 09/18/19 0930             Moist Heat    MH Applied  Yes  -GC      Location  (L) shoulder - pt seated w/ pillow for support  -GC      Rx Minutes  10 mins  -GC      MH Prior to Rx  Yes  -GC         Ice    Ice Applied  Yes  -GC      Location  left shoulder/medial upper arm w/ IFC - pt supine with roll to support arm  -GC      Ice S/P Rx  Yes  -GC      Patient reports will apply ice at home to involved area  Yes  -GC         ELECTRICAL STIMULATION    Attended/Unattended  Unattended  -GC      Stimulation Type  IFC  -GC      Location/Electrode Placement/Other  left shoulder/upper arm  - with cp  -GC       PT E-Stim Unattended (Manual) Minutes  15  -GC        User Key  (r) = Recorded By, (t) = Taken By, (c) = Cosigned By    Initials Name Provider Type    Liam Godinez, PT Physical Therapist        OP Exercises     Row Name 09/18/19 0930             Subjective Comments    Subjective Comments  Pt states her shoulder is sore from working multiple shifts, but overall she feels like she is doing well.  -GC         Exercise 1    Exercise Name 1  Pulley-FLEX  -GC      Cueing 1  Verbal;Demo  -GC      Time 1  5 min  -GC         Exercise 2    Exercise Name 2  Pulley-Scaption  -GC      Cueing 2  Verbal;Demo  -GC      Time 2  5 minutes  -GC         Exercise 3    Exercise Name 3  Cane stretch-FLEX  -GC      Cueing 3  Verbal;Demo  -GC      Reps 3  15  -GC         Exercise 4    Exercise Name 4  Cane stretch-ABD  -GC      Cueing 4  Verbal;Demo  -GC      Reps 4  15  -GC         Exercise 5    Exercise Name 5  Cane stretch-ER  -GC      Cueing 5  Verbal;Demo  -GC      Reps 5  15  -GC         Exercise 6    Exercise Name 6  shoulder IR vs theraband  -GC      Cueing 6  Verbal;Demo  -GC      Reps 6  40  -GC      Time 6  silver  -GC         Exercise 7    Exercise Name 7  shoulder ER vs therband  -GC      Cueing 7  Verbal;Demo  -GC      Reps 7  40  -GC      Time 7  silver  -GC         Exercise 8    Exercise  Name 8  Shoulder rows vs theraband  -GC      Cueing 8  Verbal;Demo  -GC      Reps 8  40  -GC      Time 8  gold  -GC         Exercise 9    Exercise Name 9  Shoulder Extension vs theraband  -GC      Cueing 9  Verbal;Demo  -GC      Reps 9  40  -GC      Time 9  gold  -GC         Exercise 10    Exercise Name 10  Scaption Up  -GC      Cueing 10  Verbal;Demo  -GC      Reps 10  40  -GC      Time 10  4#  -GC         Exercise 11    Exercise Name 11  Scaption down  -GC      Cueing 11  Verbal;Demo  -GC      Reps 11  40  -GC      Time 11  4#  -GC         Exercise 12    Exercise Name 12  Sidelying ER  -GC      Cueing 12  Verbal;Tactile  -GC      Reps 12  40  -GC      Time 12  4#  -GC         Exercise 13    Exercise Name 13  Prone Hor ABD 90  -GC      Cueing 13  Verbal;Tactile  -GC      Reps 13  40  -GC      Time 13  4#  -GC         Exercise 14    Exercise Name 14  Prone Hor   -GC      Cueing 14  Verbal;Tactile  -GC      Reps 14  40  -GC      Time 14  4#  -GC         Exercise 15    Exercise Name 15  Sleep Stretch  -GC      Cueing 15  Verbal;Tactile  -GC      Reps 15  10x10  -GC         Exercise 16    Exercise Name 16  serratus punch  -GC      Reps 16  15  -GC        User Key  (r) = Recorded By, (t) = Taken By, (c) = Cosigned By    Initials Name Provider Type     Liam Henson, PT Physical Therapist                                          Time Calculation:   Start Time: 0930  Stop Time: 1044  Time Calculation (min): 74 min  Therapy Charges for Today     Code Description Service Date Service Provider Modifiers Qty    92450065077 HC PT MANUAL THERAPY EA 15 MIN 9/18/2019 Liam Henson, PT GP 1    32202823431 HC PT THER PROC EA 15 MIN 9/18/2019 Liam Henson, PT GP 1                    Liam Henson PT  9/18/2019

## 2019-09-18 NOTE — TELEPHONE ENCOUNTER
FAST TRACK (IN MEDIA) - LAST COLONOSCOPY 09/23/2014 (IN CHART) - PERSONAL HISTORY POLYPS * FAMILY HISTORY CRC, IN FATHER - SCHEDULE [facility].    ALREADY SCHEDULED [facility] 12/11/2019 AT 8:30AM - ARRIVE 7:30AM.

## 2019-09-22 ENCOUNTER — PREP FOR SURGERY (OUTPATIENT)
Dept: OTHER | Facility: HOSPITAL | Age: 64
End: 2019-09-22

## 2019-09-22 DIAGNOSIS — Z86.010 PERSONAL HISTORY OF COLONIC POLYPS: Primary | ICD-10-CM

## 2019-09-24 ENCOUNTER — HOSPITAL ENCOUNTER (OUTPATIENT)
Dept: PHYSICAL THERAPY | Facility: HOSPITAL | Age: 64
Setting detail: THERAPIES SERIES
Discharge: HOME | End: 2019-09-24

## 2019-09-24 DIAGNOSIS — Z98.890 STATUS POST LEFT ROTATOR CUFF REPAIR: Primary | ICD-10-CM

## 2019-09-24 PROCEDURE — 97110 THERAPEUTIC EXERCISES: CPT

## 2019-09-24 PROCEDURE — 97140 MANUAL THERAPY 1/> REGIONS: CPT

## 2019-09-24 NOTE — THERAPY TREATMENT NOTE
Outpatient Physical Therapy Ortho Treatment Note   Mary Domínguez     Patient Name: Flores Seaman  : 1955  MRN: [id number]  Today's Date: 2019      Visit Date: 2019    Visit Dx:    ICD-10-CM ICD-9-CM   1. Status post left rotator cuff repair Z98.890 V45.89       Patient Active Problem List   Diagnosis   • Injury of glenoid labrum   • Complete tear of left rotator cuff   • Arthritis of left acromioclavicular joint   • Biceps tendinitis of left upper extremity   • Medial epicondylitis of elbow, right        Past Medical History:   Diagnosis Date   • Asthma    • History of anemia    • History of methicillin resistant staphylococcus aureus (MRSA)     IFROM A NATASHA BLOOM MD TREATED   • Rotator cuff injury     LEFT        Past Surgical History:   Procedure Laterality Date   • APPENDECTOMY     • COLONOSCOPY     • CYST REMOVAL      FOREHEAD   • SHOULDER ARTHROSCOPY W/ ROTATOR CUFF REPAIR Left 2019    Procedure: LEFT SHOULDER ARTHROSCOPY WITH ROTATOR CUFF REPAIR, SUBACROMIAL DECOMPRESSION AND BICEPS TENOTOMY;  Surgeon: Jarett Walker MD;  Location: [facility] OR [facility] [place];  Service: Orthopedics   • TONSILLECTOMY                         PT Assessment/Plan     Row Name 19 0937          PT Assessment    Assessment Comments  Pt continues to demonstrate improved ROM and overall strength and function.   -KM        PT Plan    PT Plan Comments  Pt to complete HEP  -KM       User Key  (r) = Recorded By, (t) = Taken By, (c) = Cosigned By    Initials Name Provider Type    Meena Bernard PTA Physical Therapy Assistant          Modalities     Row Name 19 0937             Moist Heat    MH Applied  Yes  -KM      Location  (L) shoulder - pt seated w/ pillow for support  -KM      Rx Minutes  10 mins  -KM      MH Prior to Rx  Yes  -KM         Ice    Ice Applied  Yes  -KM      Location  left shoulder/medial upper arm w/ IFC - pt supine with roll to support arm  -KM      Ice S/P Rx  Yes  -KM       Patient reports will apply ice at home to involved area  Yes  -KM         ELECTRICAL STIMULATION    Attended/Unattended  Unattended  -KM      Stimulation Type  IFC  -KM      Location/Electrode Placement/Other  left shoulder/upper arm  - with cp  -KM        User Key  (r) = Recorded By, (t) = Taken By, (c) = Cosigned By    Initials Name Provider Type    Meena Bernard PTA Physical Therapy Assistant        OP Exercises     Row Name 09/24/19 0969             Subjective Comments    Subjective Comments  Pt states her shoulder feels sore after traveling over the weekend. States she did not complete her HEP over the weekend and could tell a difference. States she had worked a couple of shifts last week and went well.   -KM         Exercise 1    Exercise Name 1  Pulley-FLEX  -KM      Cueing 1  Verbal;Demo  -KM      Time 1  5 min  -KM         Exercise 2    Exercise Name 2  Pulley-Scaption  -KM      Cueing 2  Verbal;Demo  -KM      Time 2  5 minutes  -KM         Exercise 3    Exercise Name 3  Cane stretch-FLEX  -KM      Cueing 3  Verbal;Demo  -KM      Reps 3  15  -KM         Exercise 4    Exercise Name 4  Cane stretch-ABD  -KM      Cueing 4  Verbal;Demo  -KM      Reps 4  15  -KM         Exercise 5    Exercise Name 5  Cane stretch-ER  -KM      Cueing 5  Verbal;Demo  -KM      Reps 5  15  -KM         Exercise 6    Exercise Name 6  shoulder IR vs theraband  -KM      Cueing 6  Verbal;Demo  -KM      Reps 6  40  -KM      Time 6  silver  -KM         Exercise 7    Exercise Name 7  shoulder ER vs therband  -KM      Cueing 7  Verbal;Demo  -KM      Reps 7  40  -KM      Time 7  silver  -KM         Exercise 8    Exercise Name 8  Shoulder rows vs theraband  -KM      Cueing 8  Verbal;Demo  -KM      Reps 8  40  -KM      Time 8  gold  -KM         Exercise 9    Exercise Name 9  Shoulder Extension vs theraband  -KM      Cueing 9  Verbal;Demo  -KM      Reps 9  40  -KM      Time 9  gold  -KM         Exercise 10    Exercise Name 10  Scaption Up   -KM      Cueing 10  Verbal;Demo  -KM      Reps 10  40  -KM      Time 10  4#  -KM         Exercise 11    Exercise Name 11  Scaption down  -KM      Cueing 11  Verbal;Demo  -KM      Reps 11  40  -KM      Time 11  4#  -KM         Exercise 12    Exercise Name 12  Sidelying ER  -KM      Cueing 12  Verbal;Tactile  -KM      Reps 12  40  -KM      Time 12  4#  -KM         Exercise 13    Exercise Name 13  Prone Hor ABD 90  -KM      Cueing 13  Verbal;Tactile  -KM      Reps 13  40  -KM      Time 13  4#  -KM         Exercise 14    Exercise Name 14  Prone Hor   -KM      Cueing 14  Verbal;Tactile  -KM      Reps 14  40  -KM      Time 14  4#  -KM         Exercise 15    Exercise Name 15  Sleep Stretch  -KM      Cueing 15  Verbal;Tactile  -KM      Reps 15  10x10  -KM         Exercise 16    Exercise Name 16  serratus punch  -KM      Reps 16  15  -KM        User Key  (r) = Recorded By, (t) = Taken By, (c) = Cosigned By    Initials Name Provider Type    Meena Bernard PTA Physical Therapy Assistant                                          Time Calculation:   Start Time: 0937  Stop Time: 1100  Time Calculation (min): 83 min  Therapy Charges for Today     Code Description Service Date Service Provider Modifiers Qty    71803005133 HC PT MANUAL THERAPY EA 15 MIN 9/24/2019 Meena Rizo PTA GP 1    48157835248 HC PT THER PROC EA 15 MIN 9/24/2019 Meena Rizo PTA GP 1                    Meena Rizo PTA  9/24/2019

## 2019-09-30 ENCOUNTER — HOSPITAL ENCOUNTER (OUTPATIENT)
Dept: PHYSICAL THERAPY | Facility: HOSPITAL | Age: 64
Setting detail: THERAPIES SERIES
Discharge: HOME | End: 2019-09-30

## 2019-09-30 DIAGNOSIS — Z98.890 STATUS POST LEFT ROTATOR CUFF REPAIR: Primary | ICD-10-CM

## 2019-09-30 PROCEDURE — 97140 MANUAL THERAPY 1/> REGIONS: CPT | Performed by: PHYSICAL THERAPIST

## 2019-09-30 PROCEDURE — 97110 THERAPEUTIC EXERCISES: CPT | Performed by: PHYSICAL THERAPIST

## 2019-10-16 ENCOUNTER — HOSPITAL ENCOUNTER (OUTPATIENT)
Dept: PHYSICAL THERAPY | Facility: HOSPITAL | Age: 64
Setting detail: THERAPIES SERIES
Discharge: HOME | End: 2019-10-16

## 2019-10-16 DIAGNOSIS — Z98.890 STATUS POST LEFT ROTATOR CUFF REPAIR: Primary | ICD-10-CM

## 2019-10-16 PROCEDURE — 97140 MANUAL THERAPY 1/> REGIONS: CPT

## 2019-10-16 PROCEDURE — 97110 THERAPEUTIC EXERCISES: CPT

## 2019-10-16 NOTE — THERAPY TREATMENT NOTE
Outpatient Physical Therapy Ortho Treatment Note   Mary Domínguez     Patient Name: Flores Seaman  : 1955  MRN: [id number]  Today's Date: 10/16/2019      Visit Date: 10/16/2019    Visit Dx:    ICD-10-CM ICD-9-CM   1. Status post left rotator cuff repair Z98.890 V45.89       Patient Active Problem List   Diagnosis   • Injury of glenoid labrum   • Complete tear of left rotator cuff   • Arthritis of left acromioclavicular joint   • Biceps tendinitis of left upper extremity   • Medial epicondylitis of elbow, right        Past Medical History:   Diagnosis Date   • Asthma    • History of anemia    • History of methicillin resistant staphylococcus aureus (MRSA)     PATRICIA KAUR - MARISEL BLOOM MD TREATED   • Rotator cuff injury     LEFT        Past Surgical History:   Procedure Laterality Date   • APPENDECTOMY     • COLONOSCOPY     • CYST REMOVAL      FOREHEAD   • SHOULDER ARTHROSCOPY W/ ROTATOR CUFF REPAIR Left 2019    Procedure: LEFT SHOULDER ARTHROSCOPY WITH ROTATOR CUFF REPAIR, SUBACROMIAL DECOMPRESSION AND BICEPS TENOTOMY;  Surgeon: Jarett Walker MD;  Location: [facility] OR [facility] [place];  Service: Orthopedics   • TONSILLECTOMY                         PT Assessment/Plan     Row Name 10/16/19 1140          PT Assessment    Assessment Comments  Pt has met goals set by PT in regards to full ROM and completes  all functional activities with decreased pain. Encouraged pt to start swimming and to continue with HEP.   -KM        PT Plan    PT Plan Comments  Plan to discharge pt from PT at this time. Pt to reach out with any issues.   -KM       User Key  (r) = Recorded By, (t) = Taken By, (c) = Cosigned By    Initials Name Provider Type    Meena Bernard PTA Physical Therapy Assistant          Modalities     Row Name 10/16/19 5981             Subjective Comments    Subjective Comments  Pt reports her shoulder continues to be sore and feels reaching behind her back is tight. Pt continues to complete daily  activities with no limitations.   -KM         Moist Heat    MH Applied  Yes  -KM      Location  (L) shoulder - pt seated w/ pillow for support  -KM      Rx Minutes  10 mins  -KM      MH Prior to Rx  Yes  -KM         Ice    Ice Applied  Yes  -KM      Location  left shoulder/medial upper arm w/ IFC - pt supine with roll to support arm  -KM      Ice S/P Rx  Yes  -KM      Patient reports will apply ice at home to involved area  Yes  -KM         ELECTRICAL STIMULATION    Attended/Unattended  Unattended  -KM      Stimulation Type  IFC  -KM      Location/Electrode Placement/Other  left shoulder/upper arm  - with cp  -KM        User Key  (r) = Recorded By, (t) = Taken By, (c) = Cosigned By    Initials Name Provider Type    Meena Bernard, PTA Physical Therapy Assistant        OP Exercises     Row Name 10/16/19 1140             Subjective Comments    Subjective Comments  Pt reports her shoulder continues to be sore and feels reaching behind her back is tight. Pt continues to complete daily activities with no limitations.   -KM         Exercise 1    Exercise Name 1  Pulley-FLEX  -KM      Cueing 1  Verbal;Demo  -KM      Time 1  5 min  -KM         Exercise 2    Exercise Name 2  Pulley-Scaption  -KM      Cueing 2  Verbal;Demo  -KM      Time 2  5 minutes  -KM         Exercise 3    Exercise Name 3  Cane stretch-FLEX  -KM      Cueing 3  Verbal;Demo  -KM      Reps 3  15  -KM         Exercise 4    Exercise Name 4  Cane stretch-ABD  -KM      Cueing 4  Verbal;Demo  -KM      Reps 4  15  -KM         Exercise 5    Exercise Name 5  Cane stretch-ER  -KM      Cueing 5  Verbal;Demo  -KM      Reps 5  15  -KM         Exercise 6    Exercise Name 6  shoulder IR vs theraband  -KM      Cueing 6  Verbal;Demo  -KM      Reps 6  40  -KM      Time 6  silver  -KM         Exercise 7    Exercise Name 7  shoulder ER vs therband  -KM      Cueing 7  Verbal;Demo  -KM      Reps 7  40  -KM      Time 7  silver  -KM         Exercise 8    Exercise Name 8   Shoulder rows vs theraband  -KM      Cueing 8  Verbal;Demo  -KM      Reps 8  40  -KM      Time 8  gold  -KM         Exercise 9    Exercise Name 9  Shoulder Extension vs theraband  -KM      Cueing 9  Verbal;Demo  -KM      Reps 9  40  -KM      Time 9  gold  -KM         Exercise 10    Exercise Name 10  Scaption Up  -KM      Cueing 10  Verbal;Demo  -KM      Reps 10  40  -KM      Time 10  4#  -KM         Exercise 11    Exercise Name 11  Scaption down  -KM      Cueing 11  Verbal;Demo  -KM      Reps 11  40  -KM      Time 11  4#  -KM         Exercise 12    Exercise Name 12  Sidelying ER  -KM      Cueing 12  Verbal;Tactile  -KM      Reps 12  40  -KM      Time 12  4#  -KM         Exercise 13    Exercise Name 13  Prone Hor ABD 90  -KM      Cueing 13  Verbal;Tactile  -KM      Reps 13  40  -KM      Time 13  4#  -KM         Exercise 14    Exercise Name 14  Prone Hor   -KM      Cueing 14  Verbal;Tactile  -KM      Reps 14  40  -KM      Time 14  4#  -KM         Exercise 15    Exercise Name 15  Sleep Stretch  -KM      Cueing 15  Verbal;Tactile  -KM      Reps 15  10x10  -KM         Exercise 16    Exercise Name 16  serratus punch  -KM      Reps 16  15  -KM        User Key  (r) = Recorded By, (t) = Taken By, (c) = Cosigned By    Initials Name Provider Type    Meena Bernard PTA Physical Therapy Assistant                      Manual Rx (last 36 hours)      Manual Treatments     Row Name 10/16/19 1140             Manual Rx 1    Manual Rx 1 Location  left shoulder  -KM      Manual Rx 1 Type  PROM into all planes 10-15 reps each  -KM      Manual Rx 1 Duration  15 min  -KM        User Key  (r) = Recorded By, (t) = Taken By, (c) = Cosigned By    Initials Name Provider Type    Meena Bernard PTA Physical Therapy Assistant                             Time Calculation:   Start Time: 1140  Stop Time: 1250  Time Calculation (min): 70 min  Therapy Charges for Today     Code Description Service Date Service Provider Modifiers Qty     80435651604  PT MANUAL THERAPY EA 15 MIN 10/16/2019 Meena Rizo, PTA GP 1    10853978516 HC PT THER PROC EA 15 MIN 10/16/2019 Meena Rizo, PTA GP 1                    Meena Rizo, NAHOMI  10/16/2019

## 2019-12-11 ENCOUNTER — OUTSIDE FACILITY SERVICE (OUTPATIENT)
Dept: GASTROENTEROLOGY | Facility: CLINIC | Age: 64
End: 2019-12-11

## 2019-12-11 ENCOUNTER — LAB REQUISITION (OUTPATIENT)
Dept: LAB | Facility: HOSPITAL | Age: 64
End: 2019-12-11

## 2019-12-11 DIAGNOSIS — Z86.010 PERSONAL HISTORY OF COLONIC POLYPS: ICD-10-CM

## 2019-12-11 PROCEDURE — 88305 TISSUE EXAM BY PATHOLOGIST: CPT | Performed by: INTERNAL MEDICINE

## 2019-12-11 PROCEDURE — 45380 COLONOSCOPY AND BIOPSY: CPT | Performed by: INTERNAL MEDICINE

## 2019-12-12 LAB
CYTO UR: NORMAL
LAB AP CASE REPORT: NORMAL
LAB AP CLINICAL INFORMATION: NORMAL
PATH REPORT.FINAL DX SPEC: NORMAL
PATH REPORT.GROSS SPEC: NORMAL

## 2019-12-19 ENCOUNTER — TRANSCRIBE ORDERS (OUTPATIENT)
Dept: ADMINISTRATIVE | Facility: HOSPITAL | Age: 64
End: 2019-12-19

## 2019-12-19 DIAGNOSIS — M25.561 CHRONIC PAIN OF RIGHT KNEE: Primary | ICD-10-CM

## 2019-12-19 DIAGNOSIS — G89.29 CHRONIC PAIN OF RIGHT KNEE: Primary | ICD-10-CM

## 2020-10-05 ENCOUNTER — TRANSCRIBE ORDERS (OUTPATIENT)
Dept: ADMINISTRATIVE | Facility: HOSPITAL | Age: 65
End: 2020-10-05

## 2020-10-05 DIAGNOSIS — Z78.0 POST-MENOPAUSAL: Primary | ICD-10-CM

## 2020-10-09 ENCOUNTER — APPOINTMENT (OUTPATIENT)
Dept: BONE DENSITY | Facility: HOSPITAL | Age: 65
End: 2020-10-09

## 2020-10-13 ENCOUNTER — APPOINTMENT (OUTPATIENT)
Dept: BONE DENSITY | Facility: HOSPITAL | Age: 65
End: 2020-10-13

## 2020-10-13 DIAGNOSIS — Z78.0 POST-MENOPAUSAL: ICD-10-CM

## 2020-10-13 PROCEDURE — 77080 DXA BONE DENSITY AXIAL: CPT

## 2020-12-14 RX ORDER — ALBUTEROL SULFATE 90 UG/1
2 AEROSOL, METERED RESPIRATORY (INHALATION) EVERY 4 HOURS PRN
Qty: 17 G | Refills: 6 | Status: SHIPPED | OUTPATIENT
Start: 2020-12-14 | End: 2020-12-16 | Stop reason: SDUPTHER

## 2020-12-16 ENCOUNTER — OFFICE VISIT (OUTPATIENT)
Dept: FAMILY MEDICINE CLINIC | Facility: CLINIC | Age: 65
End: 2020-12-16

## 2020-12-16 VITALS
HEIGHT: 63 IN | HEART RATE: 66 BPM | BODY MASS INDEX: 22.98 KG/M2 | WEIGHT: 129.7 LBS | TEMPERATURE: 97.8 F | DIASTOLIC BLOOD PRESSURE: 80 MMHG | SYSTOLIC BLOOD PRESSURE: 120 MMHG | OXYGEN SATURATION: 96 %

## 2020-12-16 DIAGNOSIS — M81.0 AGE-RELATED OSTEOPOROSIS WITHOUT CURRENT PATHOLOGICAL FRACTURE: ICD-10-CM

## 2020-12-16 DIAGNOSIS — Z12.31 SCREENING MAMMOGRAM, ENCOUNTER FOR: ICD-10-CM

## 2020-12-16 DIAGNOSIS — J45.20 MILD INTERMITTENT ASTHMA WITHOUT COMPLICATION: Primary | ICD-10-CM

## 2020-12-16 PROCEDURE — 99203 OFFICE O/P NEW LOW 30 MIN: CPT | Performed by: FAMILY MEDICINE

## 2020-12-16 RX ORDER — FLUTICASONE PROPIONATE 44 MCG
1 AEROSOL WITH ADAPTER (GRAM) INHALATION
Qty: 1 INHALER | Refills: 3 | Status: SHIPPED | OUTPATIENT
Start: 2020-12-16 | End: 2021-06-23 | Stop reason: SDUPTHER

## 2020-12-16 RX ORDER — TRIAMCINOLONE ACETONIDE 55 UG/1
2 SPRAY, METERED NASAL DAILY
COMMUNITY

## 2020-12-16 RX ORDER — ALBUTEROL SULFATE 90 UG/1
2 AEROSOL, METERED RESPIRATORY (INHALATION) EVERY 4 HOURS PRN
Qty: 17 G | Refills: 6 | Status: SHIPPED | OUTPATIENT
Start: 2020-12-16 | End: 2021-06-23 | Stop reason: SDUPTHER

## 2020-12-16 NOTE — PROGRESS NOTES
Chief Complaint   Patient presents with   • Establish Care   • Pain     feet ; possible muscle       Subjective   Flores Seaman is a 65 y.o. female.     History of Present Illness     65-year-old female here to establish as a new patient    Osteoporosis: Need to discuss calcium vitamin D and possibly a bisphosphonate medication.  She does weightbearing exercises.  She tolerated Fosamax well in the past without issue    Elevated risk for diabetes: A1c is 5.9 has been stable.  Was last checked in October 2020.  She works on her diet.    Asthma: She has not had any recent exacerbations is very well controlled.  She needs a refill of Flovent and as needed albuterol    Health maintenance:  -Due for mammogram we will schedule it for [place]  -Had a normal Pap smear last year the year before she is not due for repeat  -A1c is 5.9 was obtained in October 2020.  Fasting glucose of 95.  LDL of 141 with total cholesterol of 228.  Denies any anemia.  Not due for blood work for at least 6 more months  -Up-to-date on her colonoscopy  -Had a DEXA scan with osteoporosis she was on Fosamax back in her 40s she is not very interested in resuming at this time.  She has not been taking any calcium.  She has been on vitamin D and does do some weightbearing exercise    The following portions of the patient's history were reviewed and updated as appropriate: allergies, current medications, past family history, past medical history, past social history, past surgical history and problem list.      Past Medical History:   Diagnosis Date   • Asthma    • History of anemia    • History of methicillin resistant staphylococcus aureus (MRSA) 2009    IFROM A CUT - MARISEL BLOOM MD TREATED   • Rotator cuff injury     LEFT       Past Surgical History:   Procedure Laterality Date   • APPENDECTOMY     • COLONOSCOPY     • CYST REMOVAL      FOREHEAD   • SHOULDER ARTHROSCOPY W/ ROTATOR CUFF REPAIR Left 4/30/2019    Procedure: LEFT SHOULDER  ARTHROSCOPY WITH ROTATOR CUFF REPAIR, SUBACROMIAL DECOMPRESSION AND BICEPS TENOTOMY;  Surgeon: Jarett Walker MD;  Location: [facility] OR [facility] [place];  Service: Orthopedics   • TONSILLECTOMY         Family History   Problem Relation Age of Onset   • Cancer Father    • Breast cancer Neg Hx    • Malig Hyperthermia Neg Hx        Social History     Socioeconomic History   • Marital status:      Spouse name: Not on file   • Number of children: Not on file   • Years of education: Not on file   • Highest education level: Not on file   Tobacco Use   • Smoking status: Never Smoker   • Smokeless tobacco: Never Used   Substance and Sexual Activity   • Alcohol use: Yes   • Drug use: No   • Sexual activity: Defer       Current Outpatient Medications on File Prior to Visit   Medication Sig Dispense Refill   • beclomethasone (QVAR) 80 MCG/ACT inhaler Inhale 2 puffs As Needed (ONLY USES WITH COLDS). 1 each 11   • Cholecalciferol (VITAMIN D-3 PO) Take 1 tablet by mouth Daily.     • cyclobenzaprine (FLEXERIL) 10 MG tablet Take 10 mg by mouth 3 (Three) Times a Day As Needed for Muscle Spasms. Takes 1/2     • glucosamine-chondroitin 500-400 MG capsule capsule Take 1 capsule by mouth Daily.     • pseudoephedrine (SUDAFED) 120 MG 12 hr tablet Take 120 mg by mouth As Needed for Congestion.     • Triamcinolone Acetonide (NASACORT) 55 MCG/ACT nasal inhaler 2 sprays into the nostril(s) as directed by provider Daily.     • vitamin C (ASCORBIC ACID) 500 MG tablet Take 500 mg by mouth Daily.     • [DISCONTINUED] albuterol sulfate  (90 Base) MCG/ACT inhaler Inhale 2 puffs Every 4 (Four) Hours As Needed for Wheezing. 17 g 6     No current facility-administered medications on file prior to visit.        Review of Systems   Constitutional: Negative for activity change, chills and fever.   HENT: Negative for congestion, postnasal drip and rhinorrhea.    Eyes: Negative for blurred vision and pain.   Respiratory: Negative for cough, chest  tightness and shortness of breath.    Cardiovascular: Negative for chest pain.   Gastrointestinal: Negative for abdominal pain, constipation, diarrhea, nausea and vomiting.   Endocrine: Negative for cold intolerance and heat intolerance.   Genitourinary: Negative for decreased urine volume, dysuria and frequency.   Musculoskeletal: Negative for arthralgias, back pain and myalgias.   Skin: Negative for rash and skin lesions.   Neurological: Negative for dizziness and confusion.   Psychiatric/Behavioral: Negative for agitation, behavioral problems and depressed mood.           Vitals:    12/16/20 1446   BP: 120/80   Pulse: 66   Temp: 97.8 °F (36.6 °C)   SpO2: 96%      Objective   Physical Exam  HENT:      Head: Normocephalic.      Nose: Nose normal.      Mouth/Throat:      Mouth: Mucous membranes are moist.   Eyes:      Conjunctiva/sclera: Conjunctivae normal.      Pupils: Pupils are equal, round, and reactive to light.   Neck:      Musculoskeletal: Normal range of motion.   Cardiovascular:      Rate and Rhythm: Normal rate and regular rhythm.   Pulmonary:      Effort: Pulmonary effort is normal. No respiratory distress.      Breath sounds: Normal breath sounds. No wheezing.   Abdominal:      General: Abdomen is flat. Bowel sounds are normal.      Palpations: Abdomen is soft.   Musculoskeletal: Normal range of motion.   Skin:     General: Skin is warm and dry.      Capillary Refill: Capillary refill takes less than 2 seconds.   Neurological:      General: No focal deficit present.      Mental Status: She is alert.   Psychiatric:         Mood and Affect: Mood normal.           Assessment/Plan   Problems Addressed this Visit     None      Visit Diagnoses     Mild intermittent asthma without complication    -  Primary    Relevant Medications    fluticasone (Flovent HFA) 44 MCG/ACT inhaler    albuterol sulfate  (90 Base) MCG/ACT inhaler    Age-related osteoporosis without current pathological fracture         Screening mammogram, encounter for        Relevant Orders    Mammo Screening Digital Tomosynthesis Bilateral With CAD      Diagnoses       Codes Comments    Mild intermittent asthma without complication    -  Primary ICD-10-CM: J45.20  ICD-9-CM: 493.90     Age-related osteoporosis without current pathological fracture     ICD-10-CM: M81.0  ICD-9-CM: 733.01     Screening mammogram, encounter for     ICD-10-CM: Z12.31  ICD-9-CM: V76.12         Can consider Fosamax.  Start calcium and continue vitamin D as well as weightbearing exercise    Will refill as needed albuterol as well as Flovent    Schedule for mammogram.    Follow-up in at least 6 months or sooner as needed        Teresita Demarco MD

## 2021-01-06 ENCOUNTER — CLINICAL SUPPORT (OUTPATIENT)
Dept: INTERNAL MEDICINE | Facility: CLINIC | Age: 66
End: 2021-01-06

## 2021-01-06 PROCEDURE — 90750 HZV VACC RECOMBINANT IM: CPT | Performed by: INTERNAL MEDICINE

## 2021-01-06 PROCEDURE — 90471 IMMUNIZATION ADMIN: CPT | Performed by: INTERNAL MEDICINE

## 2021-01-07 ENCOUNTER — HOSPITAL ENCOUNTER (OUTPATIENT)
Dept: MAMMOGRAPHY | Facility: HOSPITAL | Age: 66
Discharge: HOME | End: 2021-01-07
Admitting: FAMILY MEDICINE

## 2021-01-07 DIAGNOSIS — Z12.31 SCREENING MAMMOGRAM, ENCOUNTER FOR: ICD-10-CM

## 2021-01-07 PROCEDURE — 77067 SCR MAMMO BI INCL CAD: CPT

## 2021-01-07 PROCEDURE — 77063 BREAST TOMOSYNTHESIS BI: CPT

## 2021-01-08 ENCOUNTER — APPOINTMENT (OUTPATIENT)
Dept: VACCINE CLINIC | Facility: HOSPITAL | Age: 66
End: 2021-01-08

## 2021-01-26 ENCOUNTER — IMMUNIZATION (OUTPATIENT)
Dept: VACCINE CLINIC | Facility: HOSPITAL | Age: 66
End: 2021-01-26

## 2021-01-26 PROCEDURE — 0011A: CPT | Performed by: OBSTETRICS & GYNECOLOGY

## 2021-01-26 PROCEDURE — 91301 HC SARSCO02 VAC 100MCG/0.5ML IM: CPT | Performed by: OBSTETRICS & GYNECOLOGY

## 2021-02-23 ENCOUNTER — IMMUNIZATION (OUTPATIENT)
Dept: VACCINE CLINIC | Facility: HOSPITAL | Age: 66
End: 2021-02-23

## 2021-02-23 PROCEDURE — 91301 HC SARSCO02 VAC 100MCG/0.5ML IM: CPT | Performed by: OBSTETRICS & GYNECOLOGY

## 2021-02-23 PROCEDURE — 0012A: CPT | Performed by: OBSTETRICS & GYNECOLOGY

## 2021-06-11 DIAGNOSIS — R53.83 FATIGUE, UNSPECIFIED TYPE: ICD-10-CM

## 2021-06-11 DIAGNOSIS — R73.03 PREDIABETES: Primary | ICD-10-CM

## 2021-06-11 DIAGNOSIS — M81.8 OTHER OSTEOPOROSIS WITHOUT CURRENT PATHOLOGICAL FRACTURE: ICD-10-CM

## 2021-06-11 DIAGNOSIS — R73.01 ELEVATED FASTING BLOOD SUGAR: ICD-10-CM

## 2021-06-11 DIAGNOSIS — E78.2 MIXED HYPERLIPIDEMIA: ICD-10-CM

## 2021-06-17 LAB
ALBUMIN SERPL-MCNC: 4.6 G/DL (ref 3.5–5.2)
ALBUMIN/GLOB SERPL: 2.2 G/DL
ALP SERPL-CCNC: 74 U/L (ref 39–117)
ALT SERPL-CCNC: 22 U/L (ref 1–33)
AST SERPL-CCNC: 54 U/L (ref 1–32)
BASOPHILS # BLD AUTO: 0.03 10*3/MM3 (ref 0–0.2)
BASOPHILS NFR BLD AUTO: 0.5 % (ref 0–1.5)
BILIRUB SERPL-MCNC: 0.4 MG/DL (ref 0–1.2)
BUN SERPL-MCNC: 16 MG/DL (ref 8–23)
BUN/CREAT SERPL: 20.3 (ref 7–25)
CALCIUM SERPL-MCNC: 9.8 MG/DL (ref 8.6–10.5)
CHLORIDE SERPL-SCNC: 104 MMOL/L (ref 98–107)
CHOLEST SERPL-MCNC: 213 MG/DL (ref 0–200)
CO2 SERPL-SCNC: 27 MMOL/L (ref 22–29)
CREAT SERPL-MCNC: 0.79 MG/DL (ref 0.57–1)
EOSINOPHIL # BLD AUTO: 0.19 10*3/MM3 (ref 0–0.4)
EOSINOPHIL NFR BLD AUTO: 3.2 % (ref 0.3–6.2)
ERYTHROCYTE [DISTWIDTH] IN BLOOD BY AUTOMATED COUNT: 13.4 % (ref 12.3–15.4)
GLOBULIN SER CALC-MCNC: 2.1 GM/DL
GLUCOSE SERPL-MCNC: 93 MG/DL (ref 65–99)
HBA1C MFR BLD: 5.9 % (ref 4.8–5.6)
HCT VFR BLD AUTO: 43.5 % (ref 34–46.6)
HDLC SERPL-MCNC: 80 MG/DL (ref 40–60)
HGB BLD-MCNC: 14.4 G/DL (ref 12–15.9)
IMM GRANULOCYTES # BLD AUTO: 0.01 10*3/MM3 (ref 0–0.05)
IMM GRANULOCYTES NFR BLD AUTO: 0.2 % (ref 0–0.5)
LDLC SERPL CALC-MCNC: 120 MG/DL (ref 0–100)
LYMPHOCYTES # BLD AUTO: 1.77 10*3/MM3 (ref 0.7–3.1)
LYMPHOCYTES NFR BLD AUTO: 29.9 % (ref 19.6–45.3)
MCH RBC QN AUTO: 26.7 PG (ref 26.6–33)
MCHC RBC AUTO-ENTMCNC: 33.1 G/DL (ref 31.5–35.7)
MCV RBC AUTO: 80.7 FL (ref 79–97)
MONOCYTES # BLD AUTO: 0.53 10*3/MM3 (ref 0.1–0.9)
MONOCYTES NFR BLD AUTO: 9 % (ref 5–12)
NEUTROPHILS # BLD AUTO: 3.39 10*3/MM3 (ref 1.7–7)
NEUTROPHILS NFR BLD AUTO: 57.2 % (ref 42.7–76)
NRBC BLD AUTO-RTO: 0 /100 WBC (ref 0–0.2)
PLATELET # BLD AUTO: 213 10*3/MM3 (ref 140–450)
POTASSIUM SERPL-SCNC: 5.1 MMOL/L (ref 3.5–5.2)
PROT SERPL-MCNC: 6.7 G/DL (ref 6–8.5)
RBC # BLD AUTO: 5.39 10*6/MM3 (ref 3.77–5.28)
SODIUM SERPL-SCNC: 140 MMOL/L (ref 136–145)
TRIGL SERPL-MCNC: 75 MG/DL (ref 0–150)
VLDLC SERPL CALC-MCNC: 13 MG/DL (ref 5–40)
WBC # BLD AUTO: 5.92 10*3/MM3 (ref 3.4–10.8)

## 2021-06-23 ENCOUNTER — OFFICE VISIT (OUTPATIENT)
Dept: FAMILY MEDICINE CLINIC | Facility: CLINIC | Age: 66
End: 2021-06-23

## 2021-06-23 VITALS
HEIGHT: 63 IN | HEART RATE: 68 BPM | SYSTOLIC BLOOD PRESSURE: 104 MMHG | OXYGEN SATURATION: 99 % | DIASTOLIC BLOOD PRESSURE: 68 MMHG | WEIGHT: 128.7 LBS | TEMPERATURE: 97 F | BODY MASS INDEX: 22.8 KG/M2

## 2021-06-23 DIAGNOSIS — J45.20 MILD INTERMITTENT ASTHMA WITHOUT COMPLICATION: ICD-10-CM

## 2021-06-23 DIAGNOSIS — E78.2 MIXED HYPERLIPIDEMIA: ICD-10-CM

## 2021-06-23 DIAGNOSIS — R73.01 ELEVATED FASTING BLOOD SUGAR: Primary | ICD-10-CM

## 2021-06-23 PROCEDURE — 99214 OFFICE O/P EST MOD 30 MIN: CPT | Performed by: FAMILY MEDICINE

## 2021-06-23 RX ORDER — ALBUTEROL SULFATE 90 UG/1
2 AEROSOL, METERED RESPIRATORY (INHALATION) EVERY 4 HOURS PRN
Qty: 17 G | Refills: 6 | Status: SHIPPED | OUTPATIENT
Start: 2021-06-23

## 2021-06-23 RX ORDER — FLUTICASONE PROPIONATE 44 MCG
1 AEROSOL WITH ADAPTER (GRAM) INHALATION
Qty: 1 EACH | Refills: 3 | Status: SHIPPED | OUTPATIENT
Start: 2021-06-23 | End: 2021-07-23

## 2021-06-23 NOTE — PROGRESS NOTES
Chief Complaint   Patient presents with   • Hyperlipidemia     6 MTH F/U        Subjective   Flores Seaman is a 65 y.o. female.     History of Present Illness   65-year-old female here for follow-up on prediabetes and hyperlipidemia     Osteoporosis: She does weightbearing exercises as well as vitamin D     Elevated risk for diabetes: A1c is 5.9 has been stable.       Asthma: She has not had any recent exacerbations is very well controlled.  She needs a refill of Flovent and as needed albuterol     Health maintenance:  -Normal mammogram Dec 2020  -Had a normal Pap smear  -Up-to-date on her colonoscopy  -Had a DEXA scan with osteoporosis she was on Fosamax back in her 40s she is not very interested in resuming at this time.  She has not been taking any calcium.  She has been on vitamin D and does do some weightbearing exercise     The 10-year ASCVD risk score (Katelyn GROVES Jr., et al., 2013) is: 3.7%    Values used to calculate the score:      Age: 65 years      Sex: Female      Is Non- : No      Diabetic: No      Tobacco smoker: No      Systolic Blood Pressure: 104 mmHg      Is BP treated: No      HDL Cholesterol: 80 mg/dL      Total Cholesterol: 213 mg/dL      The following portions of the patient's history were reviewed and updated as appropriate: allergies, current medications, past family history, past medical history, past social history, past surgical history and problem list.      Past Medical History:   Diagnosis Date   • Asthma    • History of anemia    • History of methicillin resistant staphylococcus aureus (MRSA) 2009    PATRICIA KAUR - MARISEL BLOOM MD TREATED   • Rotator cuff injury     LEFT       Past Surgical History:   Procedure Laterality Date   • APPENDECTOMY     • COLONOSCOPY     • CYST REMOVAL      FOREHEAD   • SHOULDER ARTHROSCOPY W/ ROTATOR CUFF REPAIR Left 4/30/2019    Procedure: LEFT SHOULDER ARTHROSCOPY WITH ROTATOR CUFF REPAIR, SUBACROMIAL DECOMPRESSION AND BICEPS  TENOTOMY;  Surgeon: Jarett Walker MD;  Location: [facility] OR [facility] [place];  Service: Orthopedics   • TONSILLECTOMY         Family History   Problem Relation Age of Onset   • Cancer Father    • Breast cancer Neg Hx    • Malig Hyperthermia Neg Hx        Social History     Socioeconomic History   • Marital status:      Spouse name: Not on file   • Number of children: Not on file   • Years of education: Not on file   • Highest education level: Not on file   Tobacco Use   • Smoking status: Never Smoker   • Smokeless tobacco: Never Used   Vaping Use   • Vaping Use: Never used   Substance and Sexual Activity   • Alcohol use: Yes   • Drug use: No   • Sexual activity: Defer       Current Outpatient Medications on File Prior to Visit   Medication Sig Dispense Refill   • Cholecalciferol (VITAMIN D-3 PO) Take 1 tablet by mouth Daily.     • cyclobenzaprine (FLEXERIL) 10 MG tablet Take 10 mg by mouth 3 (Three) Times a Day As Needed for Muscle Spasms. Takes 1/2     • glucosamine-chondroitin 500-400 MG capsule capsule Take 1 capsule by mouth Daily.     • Triamcinolone Acetonide (NASACORT) 55 MCG/ACT nasal inhaler 2 sprays into the nostril(s) as directed by provider Daily.     • vitamin C (ASCORBIC ACID) 500 MG tablet Take 500 mg by mouth Daily.     • [DISCONTINUED] albuterol sulfate  (90 Base) MCG/ACT inhaler Inhale 2 puffs Every 4 (Four) Hours As Needed for Wheezing. 17 g 6   • [DISCONTINUED] beclomethasone (QVAR) 80 MCG/ACT inhaler Inhale 2 puffs As Needed (ONLY USES WITH COLDS). 1 each 11   • [DISCONTINUED] fluticasone (Flovent HFA) 44 MCG/ACT inhaler Inhale 1 puff 2 (Two) Times a Day for 30 days. 1 inhaler 3   • [DISCONTINUED] pseudoephedrine (SUDAFED) 120 MG 12 hr tablet Take 120 mg by mouth As Needed for Congestion.       No current facility-administered medications on file prior to visit.       Review of Systems   Constitutional: Negative for chills and fever.   HENT: Negative for congestion.    Respiratory:  Negative for shortness of breath and wheezing.    Gastrointestinal: Negative for abdominal pain, nausea and vomiting.   Genitourinary: Negative for decreased urine volume.   Musculoskeletal: Negative for back pain.   Neurological: Negative for dizziness, weakness and confusion.   Psychiatric/Behavioral: Negative for decreased concentration.           Vitals:    06/23/21 0910   BP: 104/68   Pulse: 68   Temp: 97 °F (36.1 °C)   SpO2: 99%      Objective   Physical Exam  Vitals and nursing note reviewed.   Constitutional:       Appearance: She is well-developed.   HENT:      Head: Normocephalic and atraumatic.   Eyes:      Conjunctiva/sclera: Conjunctivae normal.      Pupils: Pupils are equal, round, and reactive to light.   Cardiovascular:      Rate and Rhythm: Normal rate and regular rhythm.      Heart sounds: Normal heart sounds. No murmur heard.     Pulmonary:      Effort: Pulmonary effort is normal. No respiratory distress.      Breath sounds: Normal breath sounds.   Abdominal:      General: Bowel sounds are normal.      Palpations: Abdomen is soft.   Musculoskeletal:         General: Normal range of motion.      Cervical back: Neck supple.   Skin:     General: Skin is warm and dry.   Neurological:      Mental Status: She is alert and oriented to person, place, and time.           Assessment/Plan   Problems Addressed this Visit     None      Visit Diagnoses     Elevated fasting blood sugar    -  Primary    Mixed hyperlipidemia        Mild intermittent asthma without complication        Relevant Medications    fluticasone (Flovent HFA) 44 MCG/ACT inhaler    albuterol sulfate  (90 Base) MCG/ACT inhaler      Diagnoses       Codes Comments    Elevated fasting blood sugar    -  Primary ICD-10-CM: R73.01  ICD-9-CM: 790.21     Mixed hyperlipidemia     ICD-10-CM: E78.2  ICD-9-CM: 272.2     Mild intermittent asthma without complication     ICD-10-CM: J45.20  ICD-9-CM: 493.90         Stable blood sugars  ASCVD risk  score 3.8%, no reason for any lipid-lowering agents at this time    Follow-up in 6 months for AMV           Return in about 6 months (around 12/23/2021) for Medicare Wellness.      Teresita Demarco MD

## 2021-12-27 ENCOUNTER — TELEPHONE (OUTPATIENT)
Dept: FAMILY MEDICINE CLINIC | Facility: CLINIC | Age: 66
End: 2021-12-27

## 2021-12-27 NOTE — TELEPHONE ENCOUNTER
Flores Urszula is coming in for fasting labs on 01/04/22. She is scheduled for a Medicare Wellnness exam on 01/11/22.  Can you please place lab orders.

## 2022-01-04 ENCOUNTER — LAB (OUTPATIENT)
Dept: FAMILY MEDICINE CLINIC | Facility: CLINIC | Age: 67
End: 2022-01-04

## 2022-01-04 DIAGNOSIS — E78.2 MIXED HYPERLIPIDEMIA: Primary | ICD-10-CM

## 2022-01-04 DIAGNOSIS — R73.03 PREDIABETES: ICD-10-CM

## 2022-01-04 DIAGNOSIS — R73.01 ELEVATED FASTING BLOOD SUGAR: ICD-10-CM

## 2022-01-05 LAB
ALBUMIN SERPL-MCNC: 4.4 G/DL (ref 3.8–4.8)
ALBUMIN/GLOB SERPL: 2 {RATIO} (ref 1.2–2.2)
ALP SERPL-CCNC: 68 IU/L (ref 44–121)
ALT SERPL-CCNC: 18 IU/L (ref 0–32)
AST SERPL-CCNC: 52 IU/L (ref 0–40)
BASOPHILS # BLD AUTO: 0 X10E3/UL (ref 0–0.2)
BASOPHILS NFR BLD AUTO: 0 %
BILIRUB SERPL-MCNC: 0.2 MG/DL (ref 0–1.2)
BUN SERPL-MCNC: 17 MG/DL (ref 8–27)
BUN/CREAT SERPL: 23 (ref 12–28)
CALCIUM SERPL-MCNC: 9.3 MG/DL (ref 8.7–10.3)
CHLORIDE SERPL-SCNC: 104 MMOL/L (ref 96–106)
CO2 SERPL-SCNC: 25 MMOL/L (ref 20–29)
CREAT SERPL-MCNC: 0.74 MG/DL (ref 0.57–1)
EOSINOPHIL # BLD AUTO: 0.1 X10E3/UL (ref 0–0.4)
EOSINOPHIL NFR BLD AUTO: 2 %
ERYTHROCYTE [DISTWIDTH] IN BLOOD BY AUTOMATED COUNT: 14.6 % (ref 11.7–15.4)
GLOBULIN SER CALC-MCNC: 2.2 G/DL (ref 1.5–4.5)
GLUCOSE SERPL-MCNC: 96 MG/DL (ref 65–99)
HBA1C MFR BLD: 6 % (ref 4.8–5.6)
HCT VFR BLD AUTO: 38.3 % (ref 34–46.6)
HGB BLD-MCNC: 12.3 G/DL (ref 11.1–15.9)
IMM GRANULOCYTES # BLD AUTO: 0 X10E3/UL (ref 0–0.1)
IMM GRANULOCYTES NFR BLD AUTO: 0 %
LYMPHOCYTES # BLD AUTO: 1.1 X10E3/UL (ref 0.7–3.1)
LYMPHOCYTES NFR BLD AUTO: 13 %
MCH RBC QN AUTO: 25.4 PG (ref 26.6–33)
MCHC RBC AUTO-ENTMCNC: 32.1 G/DL (ref 31.5–35.7)
MCV RBC AUTO: 79 FL (ref 79–97)
MONOCYTES # BLD AUTO: 0.4 X10E3/UL (ref 0.1–0.9)
MONOCYTES NFR BLD AUTO: 4 %
NEUTROPHILS # BLD AUTO: 6.8 X10E3/UL (ref 1.4–7)
NEUTROPHILS NFR BLD AUTO: 81 %
PLATELET # BLD AUTO: 240 X10E3/UL (ref 150–450)
POTASSIUM SERPL-SCNC: 4.3 MMOL/L (ref 3.5–5.2)
PROT SERPL-MCNC: 6.6 G/DL (ref 6–8.5)
RBC # BLD AUTO: 4.84 X10E6/UL (ref 3.77–5.28)
SODIUM SERPL-SCNC: 141 MMOL/L (ref 134–144)
WBC # BLD AUTO: 8.5 X10E3/UL (ref 3.4–10.8)

## 2022-01-11 ENCOUNTER — OFFICE VISIT (OUTPATIENT)
Dept: FAMILY MEDICINE CLINIC | Facility: CLINIC | Age: 67
End: 2022-01-11

## 2022-01-11 VITALS
BODY MASS INDEX: 23.57 KG/M2 | WEIGHT: 133 LBS | SYSTOLIC BLOOD PRESSURE: 112 MMHG | HEART RATE: 64 BPM | DIASTOLIC BLOOD PRESSURE: 80 MMHG | TEMPERATURE: 97.1 F | HEIGHT: 63 IN | OXYGEN SATURATION: 96 %

## 2022-01-11 DIAGNOSIS — Z00.00 ENCOUNTER FOR SUBSEQUENT ANNUAL WELLNESS VISIT (AWV) IN MEDICARE PATIENT: Primary | ICD-10-CM

## 2022-01-11 PROCEDURE — G0439 PPPS, SUBSEQ VISIT: HCPCS | Performed by: FAMILY MEDICINE

## 2022-01-11 PROCEDURE — 1126F AMNT PAIN NOTED NONE PRSNT: CPT | Performed by: FAMILY MEDICINE

## 2022-01-11 PROCEDURE — 1170F FXNL STATUS ASSESSED: CPT | Performed by: FAMILY MEDICINE

## 2022-01-11 PROCEDURE — 1160F RVW MEDS BY RX/DR IN RCRD: CPT | Performed by: FAMILY MEDICINE

## 2022-01-11 NOTE — PROGRESS NOTES
The ABCs of the Annual Wellness Visit  Subsequent Medicare Wellness Visit    Chief Complaint   Patient presents with   • Medicare Wellness-subsequent      Subjective    History of Present Illness:  Flores Seaman is a 66 y.o. female who presents for a Subsequent Medicare Wellness Visit.    The following portions of the patient's history were reviewed and   updated as appropriate: past social history, past surgical history and problem list.    Compared to one year ago, the patient feels her physical   health is the same.    Compared to one year ago, the patient feels her mental   health is the same.    Recent Hospitalizations:  none      Current Medical Providers:  Patient Care Team:  Teresita Demarco MD as PCP - General (Family Medicine)    Outpatient Medications Prior to Visit   Medication Sig Dispense Refill   • albuterol sulfate  (90 Base) MCG/ACT inhaler Inhale 2 puffs Every 4 (Four) Hours As Needed for Wheezing. 17 g 6   • Cholecalciferol (VITAMIN D-3 PO) Take 1 tablet by mouth Daily.     • cyclobenzaprine (FLEXERIL) 10 MG tablet Take 10 mg by mouth 3 (Three) Times a Day As Needed for Muscle Spasms. Takes 1/2     • glucosamine-chondroitin 500-400 MG capsule capsule Take 1 capsule by mouth Daily.     • Triamcinolone Acetonide (NASACORT) 55 MCG/ACT nasal inhaler 2 sprays into the nostril(s) as directed by provider Daily.     • vitamin C (ASCORBIC ACID) 500 MG tablet Take 500 mg by mouth Daily.     • fluticasone (Flovent HFA) 44 MCG/ACT inhaler Inhale 1 puff 2 (Two) Times a Day for 30 days. 1 each 3     No facility-administered medications prior to visit.       No opioid medication identified on active medication list. I have reviewed chart for other potential  high risk medication/s and harmful drug interactions in the elderly.          Aspirin is not on active medication list.     Patient Active Problem List   Diagnosis   • Injury of glenoid labrum   • Complete tear of left rotator cuff   •  "Arthritis of left acromioclavicular joint   • Biceps tendinitis of left upper extremity   • Medial epicondylitis of elbow, right     Advance Care Planning  Advance Directive is on file.     Review of Systems   Constitutional: Negative for fever.   HENT: Negative for congestion.    Respiratory: Negative for shortness of breath.    Cardiovascular: Negative for chest pain.   Gastrointestinal: Negative for abdominal pain.   Genitourinary: Negative for vaginal discharge.   Musculoskeletal: Negative for back pain.   Skin: Negative for rash.   Neurological: Negative for weakness.   Psychiatric/Behavioral: Negative for dysphoric mood.        Objective    Vitals:    01/11/22 1118   BP: 112/80   Pulse: 64   Temp: 97.1 °F (36.2 °C)   SpO2: 96%   Weight: 60.3 kg (133 lb)   Height: 160 cm (62.99\")     BMI Readings from Last 1 Encounters:   01/11/22 23.57 kg/m²   BMI is within normal parameters. No follow-up required.    Does the patient have evidence of cognitive impairment? No    Physical Exam  Vitals and nursing note reviewed.   Constitutional:       Appearance: She is well-developed.   HENT:      Head: Normocephalic and atraumatic.   Eyes:      Conjunctiva/sclera: Conjunctivae normal.      Pupils: Pupils are equal, round, and reactive to light.   Cardiovascular:      Rate and Rhythm: Normal rate and regular rhythm.      Heart sounds: Normal heart sounds. No murmur heard.      Pulmonary:      Effort: Pulmonary effort is normal. No respiratory distress.      Breath sounds: Normal breath sounds.   Abdominal:      General: Bowel sounds are normal.      Palpations: Abdomen is soft.   Musculoskeletal:         General: Normal range of motion.      Cervical back: Neck supple.   Skin:     General: Skin is warm and dry.   Neurological:      Mental Status: She is alert and oriented to person, place, and time.       Lab Results   Component Value Date    HGBA1C 6.0 (H) 01/04/2022            HEALTH RISK ASSESSMENT    Smoking " Status:  Social History     Tobacco Use   Smoking Status Never Smoker   Smokeless Tobacco Never Used     Alcohol Consumption:  Social History     Substance and Sexual Activity   Alcohol Use Yes   • Alcohol/week: 3.0 standard drinks   • Types: 3 Glasses of wine per week     Fall Risk Screen:    ANIL Fall Risk Assessment was completed, and patient is at LOW risk for falls.Assessment completed on:1/11/2022    Depression Screening:  PHQ-2/PHQ-9 Depression Screening 1/11/2022   Little interest or pleasure in doing things 0   Feeling down, depressed, or hopeless 0   Total Score 0       Health Habits and Functional and Cognitive Screening:  Functional & Cognitive Status 1/11/2022   Do you have difficulty preparing food and eating? No   Do you have difficulty bathing yourself, getting dressed or grooming yourself? No   Do you have difficulty using the toilet? No   Do you have difficulty moving around from place to place? No   Do you have trouble with steps or getting out of a bed or a chair? No   Current Diet Well Balanced Diet   Dental Exam Up to date   Eye Exam Up to date   Exercise (times per week) 5 times per week   Current Exercises Include Walking;Swimming   Do you need help using the phone?  No   Are you deaf or do you have serious difficulty hearing?  No   Do you need help with transportation? No   Do you need help shopping? No   Do you need help preparing meals?  No   Do you need help with housework?  No   Do you need help with laundry? No   Do you need help taking your medications? No   Do you need help managing money? No   Do you ever drive or ride in a car without wearing a seat belt? No   Have you felt unusual stress, anger or loneliness in the last month? No   Who do you live with? Spouse   If you need help, do you have trouble finding someone available to you? No   Have you been bothered in the last four weeks by sexual problems? No   Do you have difficulty concentrating, remembering or making decisions? No        Age-appropriate Screening Schedule:  Refer to the list below for future screening recommendations based on patient's age, sex and/or medical conditions. Orders for these recommended tests are listed in the plan section. The patient has been provided with a written plan.    Health Maintenance   Topic Date Due   • TDAP/TD VACCINES (1 - Tdap) Never done   • PAP SMEAR  Never done   • LIPID PANEL  06/16/2022   • DXA SCAN  10/13/2022   • MAMMOGRAM  01/07/2023   • INFLUENZA VACCINE  Completed   • ZOSTER VACCINE  Completed              Assessment/Plan   CMS Preventative Services Quick Reference  Risk Factors Identified During Encounter  Chronic Pain   Dementia/Memory   Inadequate Social Support, Isolation, Loneliness, Lack of Transportation, Financial Difficulties, or Caregiver Stress   Inactivity/Sedentary  The above risks/problems have been discussed with the patient.  Follow up actions/plans if indicated are seen below in the Assessment/Plan Section.  Pertinent information has been shared with the patient in the After Visit Summary.    Diagnoses and all orders for this visit:    1. Encounter for subsequent annual wellness visit (AWV) in Medicare patient (Primary)        Follow Up:   6 mo     An After Visit Summary and PPPS were made available to the patient.

## 2022-01-14 ENCOUNTER — TRANSCRIBE ORDERS (OUTPATIENT)
Dept: ADMINISTRATIVE | Facility: HOSPITAL | Age: 67
End: 2022-01-14

## 2022-01-14 DIAGNOSIS — Z12.31 VISIT FOR SCREENING MAMMOGRAM: Primary | ICD-10-CM

## 2022-01-26 ENCOUNTER — HOSPITAL ENCOUNTER (OUTPATIENT)
Dept: MAMMOGRAPHY | Facility: HOSPITAL | Age: 67
Discharge: HOME | End: 2022-01-26
Admitting: FAMILY MEDICINE

## 2022-01-26 DIAGNOSIS — Z12.31 VISIT FOR SCREENING MAMMOGRAM: ICD-10-CM

## 2022-01-26 PROCEDURE — 77067 SCR MAMMO BI INCL CAD: CPT

## 2022-01-26 PROCEDURE — 77063 BREAST TOMOSYNTHESIS BI: CPT

## 2022-02-01 ENCOUNTER — OFFICE VISIT (OUTPATIENT)
Dept: ORTHOPEDIC SURGERY | Facility: CLINIC | Age: 67
End: 2022-02-01

## 2022-02-01 VITALS
WEIGHT: 133 LBS | HEART RATE: 66 BPM | HEIGHT: 63 IN | BODY MASS INDEX: 23.57 KG/M2 | DIASTOLIC BLOOD PRESSURE: 87 MMHG | SYSTOLIC BLOOD PRESSURE: 131 MMHG

## 2022-02-01 DIAGNOSIS — S86.899A MEDIAL TIBIAL STRESS SYNDROME, INITIAL ENCOUNTER: Primary | ICD-10-CM

## 2022-02-01 PROCEDURE — 99214 OFFICE O/P EST MOD 30 MIN: CPT | Performed by: NURSE PRACTITIONER

## 2022-02-01 PROCEDURE — 73610 X-RAY EXAM OF ANKLE: CPT | Performed by: NURSE PRACTITIONER

## 2022-02-01 RX ORDER — MELOXICAM 15 MG/1
15 TABLET ORAL DAILY
Qty: 30 TABLET | Refills: 2 | Status: SHIPPED | OUTPATIENT
Start: 2022-02-01

## 2022-02-01 NOTE — PROGRESS NOTES
Subjective:     Patient ID: Flores Seaman is a 66 y.o. female.    Chief Complaint:  Right lower extremity pain, new issue to examiner  History of Present Illness  Flores Seaman presents to clinic for evaluation of her right lower extremity. Pain present on and off since December she has been running outdoors notices pain after activity. Denies any x-rays, MRI, CT. She has discontinued running since December 11 and pain has improved. Initially been experiencing pain at the distal tib-fib area after activity but again symptoms have eased off with rest. She has switched activity to swimming tolerating well rates discomfort today to 3-4 out of 10 described as a twinge along the medial and lateral distal tib-fib. Denies any presence of numbness or tingling. Not currently taking any anti-inflammatories on a daily basis. Denies any pain at the posterior calf. Denies other concerns present time.     Social History     Occupational History   • Not on file   Tobacco Use   • Smoking status: Never Smoker   • Smokeless tobacco: Never Used   Vaping Use   • Vaping Use: Never used   Substance and Sexual Activity   • Alcohol use: Yes     Alcohol/week: 3.0 standard drinks     Types: 3 Glasses of wine per week   • Drug use: No   • Sexual activity: Yes     Partners: Male     Birth control/protection: Post-menopausal      Past Medical History:   Diagnosis Date   • Allergic    • Anemia 1975    occasional, responds to oral iron   • Asthma    • History of anemia    • History of methicillin resistant staphylococcus aureus (MRSA) 2009    ESPERANZAROM A NATASHA - MARISEL BLOOM MD TREATED   • Osteopenia 2020   • Rotator cuff injury     LEFT   • Urinary tract infection     occasional     Past Surgical History:   Procedure Laterality Date   • APPENDECTOMY     • COLONOSCOPY     • CYST REMOVAL      FOREHEAD   • JOINT REPLACEMENT  2019    left shoulder repair   • SHOULDER ARTHROSCOPY W/ ROTATOR CUFF REPAIR Left 4/30/2019    Procedure: LEFT SHOULDER  "ARTHROSCOPY WITH ROTATOR CUFF REPAIR, SUBACROMIAL DECOMPRESSION AND BICEPS TENOTOMY;  Surgeon: Jarett Walker MD;  Location: [facility] OR [facility] – [place];  Service: Orthopedics   • TONSILLECTOMY         Family History   Problem Relation Age of Onset   • Cancer Father    • Hyperlipidemia Father    • Stroke Father    • Asthma Mother    • COPD Mother    • Breast cancer Neg Hx    • Malig Hyperthermia Neg Hx        Objective:  Physical Exam    Vital signs reviewed.   General: No acute distress.  Eyes: conjunctiva clear; pupils equally round and reactive  ENT: external ears and nose atraumatic; oropharynx clear  CV: no peripheral edema  Resp: normal respiratory effort  Skin: no rashes or wounds; normal turgor  Psych: mood and affect appropriate; recent and remote memory intact    Vitals:    02/01/22 1323   BP: 131/87   Pulse: 66   Weight: 60.3 kg (133 lb)   Height: 160 cm (63\")         02/01/22  1323   Weight: 60.3 kg (133 lb)     Body mass index is 23.56 kg/m².      Right Ankle Exam     Range of Motion   Dorsiflexion: 30   Plantar flexion: 45     Muscle Strength   Dorsiflexion:  5/5    Tests   Anterior drawer: negative  Varus tilt: negative    Other   Erythema: absent  Sensation: normal  Pulse: present     Comments:  Negative Powell squeeze test  Negative compression squeeze test  Positive sensation light touch on distributions right lower extremity  Negative calf tenderness negative Homans' sign                 Imaging:  Right Ankle X-Ray  Indication: Pain  Views: AP, Lateral, Mortise  Findings:  No fracture  No bony lesion  Soft tissues normal  Normal joint spaces    No prior studies available for comparison.    Assessment:        1. Medial tibial stress syndrome, initial encounter           Plan:  1. Discussed plan of care with patient. She is suffering medial tibial stress syndrome provided with exercises which were also demonstrated in clinic for the ankle including plantar flexion dorsiflexion, eversion and inversion " exercises against resistance. She does also provided with timeline how to return to running slowly return. We will start meloxicam 50 mg 1 tablet once daily May discontinue medication once pain subsides. Plan to see her back in clinic as needed. All questions answered.  Orders:  Orders Placed This Encounter   Procedures   • XR Ankle 3+ View Right       Medications:  New Medications Ordered This Visit   Medications   • meloxicam (MOBIC) 15 MG tablet     Sig: Take 1 tablet by mouth Daily.     Dispense:  30 tablet     Refill:  2       Followup:  No follow-ups on file.    Diagnoses and all orders for this visit:    1. Medial tibial stress syndrome, initial encounter (Primary)  -     XR Ankle 3+ View Right    Other orders  -     meloxicam (MOBIC) 15 MG tablet; Take 1 tablet by mouth Daily.  Dispense: 30 tablet; Refill: 2          I ordered and reviewed the HERBERT today.     Dictated utilizing Dragon dictation

## 2022-05-03 NOTE — THERAPY TREATMENT NOTE
Outpatient Physical Therapy Ortho Treatment Note   [place]     Patient Name: Flores Seaman  : 1955  MRN: [id number]  Today's Date: 2019      Visit Date: 2019    Visit Dx:    ICD-10-CM ICD-9-CM   1. Status post left rotator cuff repair Z98.890 V45.89       Patient Active Problem List   Diagnosis   • Injury of glenoid labrum   • Complete tear of left rotator cuff   • Arthritis of left acromioclavicular joint   • Biceps tendinitis of left upper extremity   • Medial epicondylitis of elbow, right        Past Medical History:   Diagnosis Date   • Asthma    • History of anemia    • History of methicillin resistant staphylococcus aureus (MRSA)     ESPERANZAROM KAITY KAUR - MARISEL BLOOM MD TREATED   • Rotator cuff injury     LEFT        Past Surgical History:   Procedure Laterality Date   • APPENDECTOMY     • COLONOSCOPY     • CYST REMOVAL      FOREHEAD   • SHOULDER ARTHROSCOPY W/ ROTATOR CUFF REPAIR Left 2019    Procedure: LEFT SHOULDER ARTHROSCOPY WITH ROTATOR CUFF REPAIR, SUBACROMIAL DECOMPRESSION AND BICEPS TENOTOMY;  Surgeon: Jarett Walker MD;  [place] [facility] [place] [facility] [place]: [place]ics   • TONSILLECTOMY                         PT Assessment/Plan     Row Name 19 0900          PT Assessment    Assessment Comments  Pt is doing well with increasing ROM noted with the stretches.  -GC        PT Plan    PT Plan Comments  Pt is to continue her HEP daily.  -GC       User Key  (r) = Recorded By, (t) = Taken By, (c) = Cosigned By    Initials Name Provider Type    Liam Godinez, PT Physical Therapist          Modalities     Row Name 19 0900             Subjective Comments    Subjective Comments  Pt states her shoulder got pretty sore pushing a shopping cart.  -GC         Moist Heat    MH Applied  Yes  -GC      Location  (L) shoulder - pt seated w/ pillow for support  -GC      Rx Minutes  10 mins  -GC      MH Prior to Rx  Yes  -GC         Ice    Ice Applied  Yes  -GC      Location   left shoulder/medial upper arm w/ IFC - pt seated with pillow for support  -GC      Ice S/P Rx  Yes  -GC         ELECTRICAL STIMULATION    Attended/Unattended  Unattended  -GC      Stimulation Type  IFC  -GC      Location/Electrode Placement/Other  left shoulder/upper arm  - seated with CP  -GC       PT E-Stim Unattended (Manual) Minutes  15  -GC         Other Treatment Provided    Taping / Bracing  Trial of kinesiotape using fan strip for bruising/edema medial, upper arm.  Pt to leave tape up to 5 days and trim as needed.  -GC        User Key  (r) = Recorded By, (t) = Taken By, (c) = Cosigned By    Initials Name Provider Type     Liam Henson PT Physical Therapist        Exercises     Row Name 07/22/19 0900             Subjective Comments    Subjective Comments  Pt states her shoulder got pretty sore pushing a shopping cart.  -GC         Exercise 1    Exercise Name 1  Pulley-FLEX  -GC      Cueing 1  Verbal;Demo  -GC      Time 1  5 min  -GC         Exercise 2    Exercise Name 2  Pulley-Scaption  -GC      Cueing 2  Verbal;Demo  -GC      Time 2  5 min  -GC         Exercise 3    Exercise Name 3  Cane stretch-FLEX  -GC      Cueing 3  Verbal;Demo  -GC      Reps 3  15  -GC         Exercise 4    Exercise Name 4  Cane stretch-ABD  -GC      Cueing 4  Verbal;Demo  -GC      Reps 4  15  -GC         Exercise 5    Exercise Name 5  Cane stretch-ER  -GC      Cueing 5  Verbal;Demo  -GC      Reps 5  15  -GC         Exercise 6    Exercise Name 6  shoulder IR vs theraband  -GC      Cueing 6  Verbal;Demo  -GC      Reps 6  25  -GC      Time 6  silver  -GC         Exercise 7    Exercise Name 7  shoulder ER vs therband  -GC      Cueing 7  Verbal;Demo  -GC      Reps 7  25  -GC      Time 7  silver  -GC         Exercise 8    Exercise Name 8  Shoulder rows vs theraband  -GC      Cueing 8  Verbal;Demo  -GC      Reps 8  25  -GC      Time 8  silver  -GC         Exercise 9    Exercise Name 9  Shoulder Extension vs theraband  -GC       Cueing 9  Verbal;Demo  -GC      Reps 9  25  -GC      Time 9  silver  -GC         Exercise 10    Exercise Name 10  Scaption Up  -GC      Cueing 10  Verbal;Demo  -GC      Reps 10  25  -GC      Time 10  3#  -GC         Exercise 11    Exercise Name 11  Scaption down  -GC      Cueing 11  Verbal;Demo  -GC      Reps 11  25  -GC      Time 11  2#  -GC         Exercise 12    Exercise Name 12  Sidelying ER  -GC      Cueing 12  Verbal;Tactile  -GC      Reps 12  25  -GC      Time 12  3#  -GC         Exercise 13    Exercise Name 13  Prone Hor ABD 90  -GC      Cueing 13  Verbal;Tactile  -GC      Reps 13  25  -GC      Time 13  2#  -GC         Exercise 14    Exercise Name 14  Prone Hor   -GC      Cueing 14  Verbal;Tactile  -GC      Reps 14  25  -GC      Time 14  2#  -GC         Exercise 15    Exercise Name 15  Sleep Stretch  -GC      Cueing 15  Verbal;Tactile  -GC      Reps 15  10x10  -GC        User Key  (r) = Recorded By, (t) = Taken By, (c) = Cosigned By    Initials Name Provider Type     Liam Henson, PT Physical Therapist                      Manual Rx (last 36 hours)      Manual Treatments     Row Name 07/22/19 0900             Manual Rx 1    Manual Rx 1 Location  left shoulder  -GC      Manual Rx 1 Type  PROM into all planes 10-15 reps each  -GC      Manual Rx 1 Duration  15 min  -GC        User Key  (r) = Recorded By, (t) = Taken By, (c) = Cosigned By    Initials Name Provider Type     Liam Henson, PT Physical Therapist                             Time Calculation:   Start Time: 0900  Stop Time: 1012  Time Calculation (min): 72 min  Therapy Charges for Today     Code Description Service Date Service Provider Modifiers Qty    [id number]  PT MANUAL THERAPY EA 15 MIN 7/22/2019 Liam Henson, PT GP 1    [id number] HC PT THER PROC EA 15 MIN 7/22/2019 Liam Henson, PT GP 1                    Liam Henson PT  7/22/2019      equal bilaterally

## (undated) DEVICE — ENCORE® LATEX ORTHO SIZE 7, STERILE LATEX POWDER-FREE SURGICAL GLOVE: Brand: ENCORE

## (undated) DEVICE — IMMOB SHLDR PAD2 UNIV SM

## (undated) DEVICE — CANN TRPL DAM 7X7MM NO VLV

## (undated) DEVICE — Device: Brand: DRILL, 2.7MM, FOR BIOWICK™ SURELOCK™

## (undated) DEVICE — BUR SHAVER FLUSHCUT 8FLUT 5MM 13CM

## (undated) DEVICE — SKIN PREP TRAY W/CHG: Brand: MEDLINE INDUSTRIES, INC.

## (undated) DEVICE — TUBING SET, GRAVITY, 4-SPIKE

## (undated) DEVICE — GLV SURG BIOGEL LTX PF 8

## (undated) DEVICE — DRAPE,U/ SHT,SPLIT,PLAS,STERIL: Brand: MEDLINE

## (undated) DEVICE — SUT ETHLN 4/0 PS2 PLSTC 1667G

## (undated) DEVICE — BLD SHAVER RESEC SABRE COOLCUT 5MM 13CM

## (undated) DEVICE — DRSNG WND GZ CURAD OIL EMULSION 3X3IN STRL

## (undated) DEVICE — Device: Brand: QUATTRO® SUTURE PASSER NEEDLE

## (undated) DEVICE — VAPR TRIPOLAR 90 DEGREES SUCTION ELECTRODE 90 DEGREES SUCTION WITH INTEGRATED HANDPIECE AND HAND CONTROLS: Brand: VAPR

## (undated) DEVICE — POSTN ARMSLV LAT/TRACTION DISP

## (undated) DEVICE — PK ARTHSCP SHLDR TOWER 40

## (undated) DEVICE — GLV SURG BIOGEL LTX PF 7

## (undated) DEVICE — GLV SURG TRIUMPH CLASSIC PF LTX 7.5 STRL